# Patient Record
Sex: MALE | Race: WHITE | NOT HISPANIC OR LATINO | Employment: FULL TIME | URBAN - METROPOLITAN AREA
[De-identification: names, ages, dates, MRNs, and addresses within clinical notes are randomized per-mention and may not be internally consistent; named-entity substitution may affect disease eponyms.]

---

## 2018-06-21 ENCOUNTER — HOSPITAL ENCOUNTER (INPATIENT)
Facility: HOSPITAL | Age: 38
LOS: 2 days | Discharge: HOME | DRG: 247 | End: 2018-06-25
Attending: EMERGENCY MEDICINE | Admitting: HOSPITALIST

## 2018-06-21 DIAGNOSIS — R42 DIZZINESS AND GIDDINESS: ICD-10-CM

## 2018-06-21 DIAGNOSIS — I21.4 NSTEMI (NON-ST ELEVATED MYOCARDIAL INFARCTION): ICD-10-CM

## 2018-06-21 DIAGNOSIS — R07.9 CHEST PAIN: ICD-10-CM

## 2018-06-21 DIAGNOSIS — R07.9 CHEST PAIN SYNDROME: ICD-10-CM

## 2018-06-21 DIAGNOSIS — I25.10 CORONARY ARTERY DISEASE INVOLVING NATIVE CORONARY ARTERY OF NATIVE HEART WITHOUT ANGINA PECTORIS: Primary | ICD-10-CM

## 2018-06-21 DIAGNOSIS — I20.0 UNSTABLE ANGINA: ICD-10-CM

## 2018-06-21 LAB
ALBUMIN SERPL BCP-MCNC: 4.3 G/DL
ALP SERPL-CCNC: 80 U/L
ALT SERPL W/O P-5'-P-CCNC: 38 U/L
ANION GAP SERPL CALC-SCNC: 9 MMOL/L
AST SERPL-CCNC: 32 U/L
BASOPHILS # BLD AUTO: 0.05 K/UL
BASOPHILS NFR BLD: 0.5 %
BILIRUB SERPL-MCNC: 0.4 MG/DL
BNP SERPL-MCNC: <10 PG/ML
BUN SERPL-MCNC: 19 MG/DL
CALCIUM SERPL-MCNC: 9.7 MG/DL
CHLORIDE SERPL-SCNC: 109 MMOL/L
CO2 SERPL-SCNC: 22 MMOL/L
CREAT SERPL-MCNC: 1.1 MG/DL
DIFFERENTIAL METHOD: ABNORMAL
EOSINOPHIL # BLD AUTO: 0.1 K/UL
EOSINOPHIL NFR BLD: 1.5 %
ERYTHROCYTE [DISTWIDTH] IN BLOOD BY AUTOMATED COUNT: 12.5 %
EST. GFR  (AFRICAN AMERICAN): >60 ML/MIN/1.73 M^2
EST. GFR  (NON AFRICAN AMERICAN): >60 ML/MIN/1.73 M^2
GLUCOSE SERPL-MCNC: 118 MG/DL
HCT VFR BLD AUTO: 41.3 %
HGB BLD-MCNC: 14.4 G/DL
IMM GRANULOCYTES # BLD AUTO: 0.03 K/UL
IMM GRANULOCYTES NFR BLD AUTO: 0.3 %
LYMPHOCYTES # BLD AUTO: 2.1 K/UL
LYMPHOCYTES NFR BLD: 21.8 %
MCH RBC QN AUTO: 32 PG
MCHC RBC AUTO-ENTMCNC: 34.9 G/DL
MCV RBC AUTO: 92 FL
MONOCYTES # BLD AUTO: 0.6 K/UL
MONOCYTES NFR BLD: 6.7 %
NEUTROPHILS # BLD AUTO: 6.6 K/UL
NEUTROPHILS NFR BLD: 69.2 %
NRBC BLD-RTO: 0 /100 WBC
PLATELET # BLD AUTO: 262 K/UL
PMV BLD AUTO: 10.1 FL
POTASSIUM SERPL-SCNC: 3.7 MMOL/L
PROT SERPL-MCNC: 7.6 G/DL
RBC # BLD AUTO: 4.5 M/UL
SODIUM SERPL-SCNC: 140 MMOL/L
TROPONIN I SERPL DL<=0.01 NG/ML-MCNC: <0.006 NG/ML
WBC # BLD AUTO: 9.47 K/UL

## 2018-06-21 PROCEDURE — G0378 HOSPITAL OBSERVATION PER HR: HCPCS

## 2018-06-21 PROCEDURE — 85025 COMPLETE CBC W/AUTO DIFF WBC: CPT

## 2018-06-21 PROCEDURE — 99285 EMERGENCY DEPT VISIT HI MDM: CPT | Mod: ,,, | Performed by: EMERGENCY MEDICINE

## 2018-06-21 PROCEDURE — 93005 ELECTROCARDIOGRAM TRACING: CPT

## 2018-06-21 PROCEDURE — 84484 ASSAY OF TROPONIN QUANT: CPT

## 2018-06-21 PROCEDURE — 93010 ELECTROCARDIOGRAM REPORT: CPT | Mod: ,,, | Performed by: INTERNAL MEDICINE

## 2018-06-21 PROCEDURE — 99284 EMERGENCY DEPT VISIT MOD MDM: CPT | Mod: 25

## 2018-06-21 PROCEDURE — 80053 COMPREHEN METABOLIC PANEL: CPT

## 2018-06-21 PROCEDURE — 93010 ELECTROCARDIOGRAM REPORT: CPT | Mod: 76,,, | Performed by: INTERNAL MEDICINE

## 2018-06-21 PROCEDURE — 99220 PR INITIAL OBSERVATION CARE,LEVL III: CPT | Mod: ,,, | Performed by: PHYSICIAN ASSISTANT

## 2018-06-21 PROCEDURE — 25000003 PHARM REV CODE 250: Performed by: EMERGENCY MEDICINE

## 2018-06-21 PROCEDURE — 83880 ASSAY OF NATRIURETIC PEPTIDE: CPT

## 2018-06-21 RX ORDER — AMOXICILLIN 250 MG
1 CAPSULE ORAL 2 TIMES DAILY PRN
Status: DISCONTINUED | OUTPATIENT
Start: 2018-06-21 | End: 2018-06-25 | Stop reason: HOSPADM

## 2018-06-21 RX ORDER — KETOROLAC TROMETHAMINE 30 MG/ML
15 INJECTION, SOLUTION INTRAMUSCULAR; INTRAVENOUS EVERY 6 HOURS PRN
Status: ACTIVE | OUTPATIENT
Start: 2018-06-21 | End: 2018-06-24

## 2018-06-21 RX ORDER — ASPIRIN 81 MG/1
81 TABLET ORAL DAILY
Status: DISCONTINUED | OUTPATIENT
Start: 2018-06-22 | End: 2018-06-25 | Stop reason: HOSPADM

## 2018-06-21 RX ORDER — SIMVASTATIN 40 MG/1
40 TABLET, FILM COATED ORAL NIGHTLY
Status: DISCONTINUED | OUTPATIENT
Start: 2018-06-21 | End: 2018-06-25 | Stop reason: HOSPADM

## 2018-06-21 RX ORDER — HYDROCODONE BITARTRATE AND ACETAMINOPHEN 5; 325 MG/1; MG/1
1 TABLET ORAL EVERY 6 HOURS PRN
Status: DISCONTINUED | OUTPATIENT
Start: 2018-06-22 | End: 2018-06-25 | Stop reason: HOSPADM

## 2018-06-21 RX ORDER — IPRATROPIUM BROMIDE AND ALBUTEROL SULFATE 2.5; .5 MG/3ML; MG/3ML
3 SOLUTION RESPIRATORY (INHALATION) EVERY 4 HOURS PRN
Status: DISCONTINUED | OUTPATIENT
Start: 2018-06-21 | End: 2018-06-25 | Stop reason: HOSPADM

## 2018-06-21 RX ORDER — IBUPROFEN 200 MG
16 TABLET ORAL
Status: DISCONTINUED | OUTPATIENT
Start: 2018-06-21 | End: 2018-06-25 | Stop reason: HOSPADM

## 2018-06-21 RX ORDER — ACETAMINOPHEN 325 MG/1
650 TABLET ORAL EVERY 4 HOURS PRN
Status: DISCONTINUED | OUTPATIENT
Start: 2018-06-21 | End: 2018-06-25 | Stop reason: HOSPADM

## 2018-06-21 RX ORDER — IBUPROFEN 200 MG
24 TABLET ORAL
Status: DISCONTINUED | OUTPATIENT
Start: 2018-06-21 | End: 2018-06-25 | Stop reason: HOSPADM

## 2018-06-21 RX ORDER — SODIUM CHLORIDE 0.9 % (FLUSH) 0.9 %
5 SYRINGE (ML) INJECTION
Status: DISCONTINUED | OUTPATIENT
Start: 2018-06-21 | End: 2018-06-25 | Stop reason: HOSPADM

## 2018-06-21 RX ORDER — RAMELTEON 8 MG/1
8 TABLET ORAL NIGHTLY PRN
Status: DISCONTINUED | OUTPATIENT
Start: 2018-06-21 | End: 2018-06-25 | Stop reason: HOSPADM

## 2018-06-21 RX ORDER — PROMETHAZINE HYDROCHLORIDE 12.5 MG/1
25 TABLET ORAL EVERY 6 HOURS PRN
Status: DISCONTINUED | OUTPATIENT
Start: 2018-06-21 | End: 2018-06-25 | Stop reason: HOSPADM

## 2018-06-21 RX ORDER — ENOXAPARIN SODIUM 100 MG/ML
40 INJECTION SUBCUTANEOUS EVERY 24 HOURS
Status: DISCONTINUED | OUTPATIENT
Start: 2018-06-22 | End: 2018-06-25 | Stop reason: HOSPADM

## 2018-06-21 RX ORDER — ASPIRIN 325 MG
325 TABLET ORAL
Status: COMPLETED | OUTPATIENT
Start: 2018-06-21 | End: 2018-06-21

## 2018-06-21 RX ORDER — GLUCAGON 1 MG
1 KIT INJECTION
Status: DISCONTINUED | OUTPATIENT
Start: 2018-06-21 | End: 2018-06-25 | Stop reason: HOSPADM

## 2018-06-21 RX ORDER — LISINOPRIL 10 MG/1
10 TABLET ORAL DAILY
Status: DISCONTINUED | OUTPATIENT
Start: 2018-06-22 | End: 2018-06-25 | Stop reason: HOSPADM

## 2018-06-21 RX ORDER — ONDANSETRON 4 MG/1
4 TABLET, ORALLY DISINTEGRATING ORAL EVERY 8 HOURS PRN
Status: DISCONTINUED | OUTPATIENT
Start: 2018-06-21 | End: 2018-06-25 | Stop reason: HOSPADM

## 2018-06-21 RX ADMIN — ASPIRIN 325 MG ORAL TABLET 325 MG: 325 PILL ORAL at 08:06

## 2018-06-22 PROBLEM — I25.10 CORONARY ARTERY DISEASE INVOLVING NATIVE CORONARY ARTERY OF NATIVE HEART WITHOUT ANGINA PECTORIS: Status: ACTIVE | Noted: 2018-06-22

## 2018-06-22 PROBLEM — R07.89 ATYPICAL CHEST PAIN: Status: ACTIVE | Noted: 2018-06-21

## 2018-06-22 PROBLEM — I50.22 CHRONIC SYSTOLIC HEART FAILURE: Status: ACTIVE | Noted: 2018-06-22

## 2018-06-22 LAB
ANION GAP SERPL CALC-SCNC: 9 MMOL/L
BASOPHILS # BLD AUTO: 0.04 K/UL
BASOPHILS NFR BLD: 0.5 %
BUN SERPL-MCNC: 21 MG/DL
CALCIUM SERPL-MCNC: 9.5 MG/DL
CHLORIDE SERPL-SCNC: 107 MMOL/L
CHOLEST SERPL-MCNC: 173 MG/DL
CHOLEST/HDLC SERPL: 7.2 {RATIO}
CO2 SERPL-SCNC: 24 MMOL/L
CREAT SERPL-MCNC: 1 MG/DL
DIASTOLIC DYSFUNCTION: NO
DIFFERENTIAL METHOD: ABNORMAL
EOSINOPHIL # BLD AUTO: 0.2 K/UL
EOSINOPHIL NFR BLD: 2 %
ERYTHROCYTE [DISTWIDTH] IN BLOOD BY AUTOMATED COUNT: 12.6 %
EST. GFR  (AFRICAN AMERICAN): >60 ML/MIN/1.73 M^2
EST. GFR  (NON AFRICAN AMERICAN): >60 ML/MIN/1.73 M^2
ESTIMATED AVG GLUCOSE: 100 MG/DL
GLUCOSE SERPL-MCNC: 94 MG/DL
HBA1C MFR BLD HPLC: 5.1 %
HCT VFR BLD AUTO: 38.9 %
HDLC SERPL-MCNC: 24 MG/DL
HDLC SERPL: 13.9 %
HGB BLD-MCNC: 13.3 G/DL
IMM GRANULOCYTES # BLD AUTO: 0.02 K/UL
IMM GRANULOCYTES NFR BLD AUTO: 0.3 %
LDLC SERPL CALC-MCNC: 124.8 MG/DL
LYMPHOCYTES # BLD AUTO: 2.7 K/UL
LYMPHOCYTES NFR BLD: 34.2 %
MAGNESIUM SERPL-MCNC: 2.3 MG/DL
MCH RBC QN AUTO: 32 PG
MCHC RBC AUTO-ENTMCNC: 34.2 G/DL
MCV RBC AUTO: 94 FL
MONOCYTES # BLD AUTO: 0.6 K/UL
MONOCYTES NFR BLD: 7.1 %
NEUTROPHILS # BLD AUTO: 4.4 K/UL
NEUTROPHILS NFR BLD: 55.9 %
NONHDLC SERPL-MCNC: 149 MG/DL
NRBC BLD-RTO: 0 /100 WBC
PHOSPHATE SERPL-MCNC: 4.9 MG/DL
PLATELET # BLD AUTO: 215 K/UL
PMV BLD AUTO: 9.8 FL
POTASSIUM SERPL-SCNC: 4.1 MMOL/L
RBC # BLD AUTO: 4.16 M/UL
RETIRED EF AND QEF - SEE NOTES: 50 (ref 55–65)
SODIUM SERPL-SCNC: 140 MMOL/L
TRIGL SERPL-MCNC: 121 MG/DL
TROPONIN I SERPL DL<=0.01 NG/ML-MCNC: <0.006 NG/ML
TROPONIN I SERPL DL<=0.01 NG/ML-MCNC: <0.006 NG/ML
WBC # BLD AUTO: 7.87 K/UL

## 2018-06-22 PROCEDURE — 80061 LIPID PANEL: CPT

## 2018-06-22 PROCEDURE — 93880 EXTRACRANIAL BILAT STUDY: CPT

## 2018-06-22 PROCEDURE — 93306 TTE W/DOPPLER COMPLETE: CPT | Mod: 26,,, | Performed by: INTERNAL MEDICINE

## 2018-06-22 PROCEDURE — 93306 TTE W/DOPPLER COMPLETE: CPT

## 2018-06-22 PROCEDURE — 93880 EXTRACRANIAL BILAT STUDY: CPT | Mod: 26,,, | Performed by: INTERNAL MEDICINE

## 2018-06-22 PROCEDURE — 84484 ASSAY OF TROPONIN QUANT: CPT | Mod: 91

## 2018-06-22 PROCEDURE — 85025 COMPLETE CBC W/AUTO DIFF WBC: CPT

## 2018-06-22 PROCEDURE — 36415 COLL VENOUS BLD VENIPUNCTURE: CPT

## 2018-06-22 PROCEDURE — 83036 HEMOGLOBIN GLYCOSYLATED A1C: CPT

## 2018-06-22 PROCEDURE — G0378 HOSPITAL OBSERVATION PER HR: HCPCS

## 2018-06-22 PROCEDURE — 84100 ASSAY OF PHOSPHORUS: CPT

## 2018-06-22 PROCEDURE — 25000003 PHARM REV CODE 250: Performed by: PHYSICIAN ASSISTANT

## 2018-06-22 PROCEDURE — 99233 SBSQ HOSP IP/OBS HIGH 50: CPT | Mod: ,,, | Performed by: PHYSICIAN ASSISTANT

## 2018-06-22 PROCEDURE — 80048 BASIC METABOLIC PNL TOTAL CA: CPT

## 2018-06-22 PROCEDURE — 83735 ASSAY OF MAGNESIUM: CPT

## 2018-06-22 PROCEDURE — 63600175 PHARM REV CODE 636 W HCPCS: Performed by: PHYSICIAN ASSISTANT

## 2018-06-22 RX ORDER — CLONAZEPAM 1 MG/1
1 TABLET ORAL 2 TIMES DAILY PRN
Status: DISCONTINUED | OUTPATIENT
Start: 2018-06-22 | End: 2018-06-25 | Stop reason: HOSPADM

## 2018-06-22 RX ORDER — NICOTINE 7MG/24HR
1 PATCH, TRANSDERMAL 24 HOURS TRANSDERMAL DAILY
Status: DISCONTINUED | OUTPATIENT
Start: 2018-06-22 | End: 2018-06-25 | Stop reason: HOSPADM

## 2018-06-22 RX ADMIN — LISINOPRIL 10 MG: 10 TABLET ORAL at 08:06

## 2018-06-22 RX ADMIN — ASPIRIN 81 MG: 81 TABLET, COATED ORAL at 08:06

## 2018-06-22 RX ADMIN — CLONAZEPAM 1 MG: 1 TABLET ORAL at 11:06

## 2018-06-22 RX ADMIN — ENOXAPARIN SODIUM 40 MG: 100 INJECTION SUBCUTANEOUS at 05:06

## 2018-06-22 RX ADMIN — CLONAZEPAM 1 MG: 1 TABLET ORAL at 08:06

## 2018-06-22 RX ADMIN — SIMVASTATIN 40 MG: 20 TABLET, FILM COATED ORAL at 12:06

## 2018-06-22 RX ADMIN — HYDROCODONE BITARTRATE AND ACETAMINOPHEN 1 TABLET: 5; 325 TABLET ORAL at 08:06

## 2018-06-22 RX ADMIN — SIMVASTATIN 40 MG: 20 TABLET, FILM COATED ORAL at 08:06

## 2018-06-22 RX ADMIN — POTASSIUM BICARBONATE 25 MEQ: 25 TABLET, EFFERVESCENT ORAL at 12:06

## 2018-06-22 NOTE — PLAN OF CARE
06/22/18 0915   Discharge Assessment   Assessment Type Discharge Planning Assessment   Confirmed/corrected address and phone number on facesheet? Yes   Assessment information obtained from? Patient   Expected Length of Stay (days) 1   Communicated expected length of stay with patient/caregiver yes   Prior to hospitilization cognitive status: Alert/Oriented   Prior to hospitalization functional status: Independent   Current cognitive status: Alert/Oriented   Current Functional Status: Independent   Lives With significant other  (s.o., Karli Tolentino ([phone]))   Able to Return to Prior Arrangements yes   Is patient able to care for self after discharge? Yes   Patient's perception of discharge disposition home or selfcare   Readmission Within The Last 30 Days no previous admission in last 30 days   Patient currently being followed by outpatient case management? No   Patient currently receives any other outside agency services? No   Equipment Currently Used at Home none   Do you have any problems affording any of your prescribed medications? No   Is the patient taking medications as prescribed? yes   Does the patient have transportation home? Yes   Transportation Available family or friend will provide  (spouse)   Does the patient receive services at the [facility]? No   Discharge Plan A Home with family   Discharge Plan B Home Health   Patient/Family In Agreement With Plan yes     Dx: chest pain  Pharm: [facility]  Hosp f/u appt: Patient instructed to go to Dr. Abad Patterson's (PCP) walk-in clinic 1-2 weeks following discharge for the hospital follow up appointment.     Patient scheduled to have a 2D echo & carotid US done today.

## 2018-06-22 NOTE — PROGRESS NOTES
[facility] Hospital Medicine  Progress Note    Patient Name: Cody Castro  MRN: [id number]  Patient Class: OP- Observation   Admission Date: 6/21/2018  Length of Stay: 0 days  Attending Physician: Yomaira Vargas MD  Primary Care Provider: Abad Patterson MD    [facility] Medicine Team: [facility] HOSP MED E Trista Boston PA-C    Subjective:     Principal Problem:Atypical chest pain    HPI:  This is a  37 y.o. male with pmhx CAD (s/p LAD and D1 PCI in 2013), +FH CAD w/ father having MI @ age 45, current smoker (25 pack year), HTN, HLD, pAfib (not on AC) who presents to the ED c/o acute episode of burning, mild (3/10), non-radiating chest pain with associated dizziness, LUE numbness and vision changes. Patient states that symptoms began at 530 pm when he stood up from sitting.  Symptoms resolved without intervention by the time he got into his car to come to the ED.  Patient reports CP similar to heartburn but had experienced BUE numbness prior to previous NSTEMI. Pt denies any SOB, CP, dizziness, jaw pain, numbness, tingling, vision changes, headache, f/c.  Pt denies CP and SOB in the ED.  States his LUE tingling felt similar to previous NSTEMI episode in 2013. Patient currently f/w PCP Dr. Patterson on [address] but does not currently f/w cardiology (preivously seen by [facility]).  Patient is poor historian and has poor understanding of medical dx.     Per chart review, admitted in 2013 with CP, SOB, diaphoresis and 2 episodes of syncope while walking in [facility] and dx with NSTEMI.  No changes on EKG but troponin trended up to 50.  ECHO on admit showed moderately depressed EF.  LHC revealed occluded LAD (collateral RCA flow) and 75% stenosis to prox D1.  MANJINDER x2 placed in LAD and PTA to diagnonal.  New onset A fib when initially presented, but spontaneously converted to NSR on day of admission.     In ED, intake labs unremarkable.  Initial troponin and BNP negative. CXR WNL.   Initial EKG shows SR with PVCs  and inverted T waves in inferior leads.  Repeat EKG shows NSR with sinus arrhythmia and inverted inferior T waves.     Hospital Course:  Cody Castro was admitted to observation for chest pain. Color flow doppler study revealed possible new wall motion abnormalities therefore exercise stress echo could not be completed. Interventional cardiology consulted.      Interval History: Patient resting in bed, no acute distress.     Review of Systems   Constitutional: Negative for chills and fever.   HENT: Negative for ear pain and sore throat.    Eyes: Negative for pain and visual disturbance.   Respiratory: Negative for cough and shortness of breath.    Cardiovascular: Negative for chest pain (resolved), palpitations and leg swelling.   Gastrointestinal: Negative for abdominal pain, diarrhea, nausea and vomiting.   Endocrine: Negative for heat intolerance and polydipsia.   Genitourinary: Negative for difficulty urinating, dysuria, frequency and urgency.   Musculoskeletal: Positive for back pain (chronic). Negative for gait problem.   Skin: Negative for rash and wound.   Neurological: Negative for dizziness (resolved), weakness and numbness (LUE; resolved).   Psychiatric/Behavioral: Negative for confusion. The patient is not nervous/anxious.      Objective:     Vital Signs (Most Recent):  Temp: 97.8 °F (36.6 °C) (06/22/18 1619)  Pulse: 70 (06/22/18 1619)  Resp: 16 (06/22/18 1619)  BP: (!) 122/59 (06/22/18 1619)  SpO2: 98 % (06/22/18 1619) Vital Signs (24h Range):  Temp:  [96.6 °F (35.9 °C)-97.8 °F (36.6 °C)] 97.8 °F (36.6 °C)  Pulse:  [56-91] 70  Resp:  [16-20] 16  SpO2:  [94 %-98 %] 98 %  BP: (111-158)/(56-81) 122/59     Weight: 109 kg (240 lb 4.8 oz)  Body mass index is 36.54 kg/m².    Intake/Output Summary (Last 24 hours) at 06/22/18 1816  Last data filed at 06/22/18 0101   Gross per 24 hour   Intake                0 ml   Output                0 ml   Net                0 ml      Physical Exam   Constitutional: He is  oriented to person, place, and time. He appears well-developed and well-nourished.   HENT:   Head: Normocephalic and atraumatic.   Eyes: Conjunctivae and EOM are normal. Pupils are equal, round, and reactive to light.   Neck: Normal range of motion. Neck supple.   Cardiovascular: Normal rate, regular rhythm, normal heart sounds and intact distal pulses.    Pulmonary/Chest: Effort normal. No respiratory distress. He has decreased breath sounds in the right upper field, the right middle field, the left upper field and the left middle field. He has no wheezes. He exhibits no tenderness.   Abdominal: Soft. Bowel sounds are normal. He exhibits no distension. There is no tenderness.   Musculoskeletal: Normal range of motion.   Neurological: He is alert and oriented to person, place, and time.   BUE STR 5/5 without numbness/tingling   Skin: Skin is warm and dry.   Psychiatric: He has a normal mood and affect. His behavior is normal. Judgment and thought content normal.   Nursing note and vitals reviewed.      Significant Labs: All pertinent labs within the past 24 hours have been reviewed.    Significant Imaging: I have reviewed all pertinent imaging results/findings within the past 24 hours.    Assessment/Plan:      * Atypical chest pain    37 y.o. male with pmhx CAD (s/p LAD and D1 PCI in 2013), +FH CAD w/ father having MI @ age 45, current smoker (25 pack year), HTN, pAfib, HLD who presents to the ED c/o acute episode of burning, mild (3/10), non-radiating chest pain with associated dizziness, LUE numbness and vision changes.  Symptoms resolved before presentation.    During color flow doppler study patient was found to have new wall motion abnormalities, interventional cardiology consulted  - Suspect GERD as etiology since patient had recently eaten pizza.  Possible ACS in setting of multiple risk factors.   - Per patient, arm numbness similar to previous presentation with NSTEMI (albeit unilateral today), but no  mention of arm numbness per chart review.   - Initial troponin and BNP negative. Will trend.   - CXR WNL.     - EKG x2 shows inverted inferior T waves. Patient asymptomatic with poor story.  Last EKG was from 2014 and could have flipped T waves since then for reasons other than ischemia. Discussed EKG with Dr. Santoyo.   - HEART score 4  - Will repeat ECHO as last was 9/3/2014  - Hg A1c 5.1  - consider stress echo or cardiology consult if troponins increase or CP returns.  - given  in ED.  Continue ASA daily.  - will need close cardiology f/u as he is poor historian and continues to smoke despite cardiac hx.         Coronary artery disease involving native coronary artery of native heart without angina pectoris    MANJINDER x2 placed in LAD and PTA to diagonal (2013)  - continue ASA. Plavix d/c after 1 year s/p PCI  - continue statin, ACEI  - does not follow with cardiology anymore, but would benefit from close cardiology f/u (previously with [facility])        Chronic systolic heart failure    Stable  - patient was not aware of this diagnosis.  Pt is also poor historian.  - Stress ECHO 9/3/2014 shows mildly depressed systolic function (45%), normal diastolic function.  Also showed non-specific failure to augment in segments with hypokinesis at rest, which cardiology did not believe was d/t ischemia.    - mild bibasilar rales, which may be atelectasis, otherwise euvolemic.  - BNP negative in setting of obesity, CXR with no evidence of edema  - not currently on any diuretics.  EF logan over the course of a year after diagnosed with NSTEMI (EF 25 at the time).  - Will recheck ECHO   - continue ACEI  - strict I/O, daily weights, tele        Paroxysmal A-fib    Afib with RVR in 2013 when dx with NSTEMI. Converted to NSR without intervention same day of admission.  - EKG shows NSR  - not currenly on BB, AC  - patient unsure if he was on AC in the past.  - CHADS VASC score 3; HASBLED 1        Cigarette smoker    25 pack year hx;  currently 1ppd  - has attempted to quit in the past but not currently motivated to do so.    - significant CAD risk factor  - nicotine patch        Hyperlipidemia    Continue statin        HTN (hypertension)    Stable  - continue lisinopril 10 mg PO daily          VTE Risk Mitigation         Ordered     enoxaparin injection 40 mg  Daily      06/21/18 2242     IP VTE HIGH RISK PATIENT  Once      06/21/18 2242     Place MARIANN hose  Until discontinued      06/21/18 2242     Place sequential compression device  Until discontinued      06/21/18 2242              Trista Boston PA-C  Department of Hospital Medicine   [facility]

## 2018-06-22 NOTE — H&P
[facility] Hospital Medicine  History & Physical    Patient Name: Cody Castro  MRN: [id number]  Admission Date: 6/21/2018  Attending Physician: Yomaira Vargas MD   Primary Care Provider: Abad Patterson MD    [facility] Medicine Team: [facility] HOSP MED E Caio Wyman PA-C     Patient information was obtained from patient, past medical records and ER records.     Subjective:     Principal Problem:Atypical chest pain    Chief Complaint:   Chief Complaint   Patient presents with    Chest Pain     Pt presents to ED stating he had an episode of mild chest pain, left arm tingling, dizziness about 20 minutes ago that only lasted about 1 minute. Hx of MI with 2 stents placed. Denies chest pain and SOB at this time. States the tingling in his arm felt exactly like it did when he had previous NSTEMI        HPI: This is a  37 y.o. male with pmhx CAD (s/p LAD and D1 PCI in 2013), +FH CAD w/ father having MI @ age 45, current smoker (25 pack year), HTN, HLD, pAfib (not on AC) who presents to the ED c/o acute episode of burning, mild (3/10), non-radiating chest pain with associated dizziness, LUE numbness and vision changes. Patient states that symptoms began at 530 pm when he stood up from sitting.  Symptoms resolved without intervention by the time he got into his car to come to the ED.  Patient reports CP similar to heartburn but had experienced BUE numbness prior to previous NSTEMI. Pt denies any SOB, CP, dizziness, jaw pain, numbness, tingling, vision changes, headache, f/c.  Pt denies CP and SOB in the ED.  States his LUE tingling felt similar to previous NSTEMI episode in 2013. Patient currently f/w PCP Dr. Patterson on [address] but does not currently f/w cardiology (preivously seen by [facility] cardio).  Patient is poor historian and has poor understanding of medical dx.     Per chart review, admitted in 2013 with CP, SOB, diaphoresis and 2 episodes of syncope while walking in [employer] and dx with NSTEMI.  No  changes on EKG but troponin trended up to 50.  ECHO on admit showed moderately depressed EF.  LHC revealed occluded LAD (collateral RCA flow) and 75% stenosis to prox D1.  MANJINDER x2 placed in LAD and PTA to diagnonal.  New onset A fib when initially presented, but spontaneously converted to NSR on day of admission.     In ED, intake labs unremarkable.  Initial troponin and BNP negative. CXR WNL.   Initial EKG shows SR with PVCs and inverted T waves in inferior leads.  Repeat EKG shows NSR with sinus arrhythmia and inverted inferior T waves.     Past Medical History:   Diagnosis Date    Anxiety     Carbon monoxide poisoning     Caused a seizure    Coronary artery disease     History of heart artery stent 2013    Hypertension     Obesity        Past Surgical History:   Procedure Laterality Date    CORONARY ANGIOPLASTY         Review of patient's allergies indicates:  No Known Allergies    No current facility-administered medications on file prior to encounter.      Current Outpatient Prescriptions on File Prior to Encounter   Medication Sig    aspirin (ECOTRIN) 81 MG EC tablet Take 81 mg by mouth once daily.    HYDROCODONE/ACETAMINOPHEN (NORCO ORAL) Take 7.5 mg by mouth every 6 (six) hours as needed.     lisinopril (PRINIVIL,ZESTRIL) 5 MG tablet Take 10 mg by mouth once daily.    simvastatin (ZOCOR) 40 MG tablet Take 40 mg by mouth every evening.    clonazepam (KLONOPIN) 1 MG tablet Take 1 mg by mouth 2 (two) times daily as needed.     Family History     Problem Relation (Age of Onset)    Heart disease Mother, Father        Social History Main Topics    Smoking status: Current Every Day Smoker     Packs/day: 0.50     Years: 20.00     Types: Cigarettes     Last attempt to quit: 2/10/2009    Smokeless tobacco: Never Used    Alcohol use Yes      Comment: occasional    Drug use: No    Sexual activity: Yes     Partners: Female     Review of Systems   Constitutional: Negative for chills and fever.   HENT:  Negative for ear pain and sore throat.    Eyes: Negative for pain and visual disturbance.   Respiratory: Negative for cough and shortness of breath.    Cardiovascular: Positive for chest pain (resolved). Negative for palpitations and leg swelling.   Gastrointestinal: Negative for abdominal pain, diarrhea, nausea and vomiting.   Endocrine: Negative for heat intolerance and polydipsia.   Genitourinary: Negative for difficulty urinating, dysuria, frequency and urgency.   Musculoskeletal: Positive for back pain (chronic). Negative for gait problem.   Skin: Negative for rash and wound.   Neurological: Positive for dizziness (resolved) and numbness (LUE; resolved). Negative for weakness.   Psychiatric/Behavioral: Negative for confusion. The patient is not nervous/anxious.      Objective:     Vital Signs (Most Recent):  Temp: 97.5 °F (36.4 °C) (06/21/18 1920)  Pulse: 73 (06/21/18 2302)  Resp: 18 (06/21/18 2144)  BP: 129/70 (06/21/18 2302)  SpO2: (!) 94 % (06/21/18 2302) Vital Signs (24h Range):  Temp:  [97.5 °F (36.4 °C)] 97.5 °F (36.4 °C)  Pulse:  [73-91] 73  Resp:  [18-20] 18  SpO2:  [94 %-97 %] 94 %  BP: (121-158)/(56-81) 129/70     Weight: 112.5 kg (248 lb)  Body mass index is 37.71 kg/m².    Physical Exam   Constitutional: He is oriented to person, place, and time. He appears well-developed and well-nourished.   HENT:   Head: Normocephalic and atraumatic.   Eyes: Conjunctivae and EOM are normal. Pupils are equal, round, and reactive to light.   Neck: Normal range of motion. Neck supple.   Cardiovascular: Normal rate, regular rhythm, normal heart sounds and intact distal pulses.    Pulmonary/Chest: Effort normal. No respiratory distress. He has decreased breath sounds in the right upper field, the right middle field, the left upper field and the left middle field. He has no wheezes. He has rales. He exhibits no tenderness.   + mild bibasilar rales   Abdominal: Soft. Bowel sounds are normal. He exhibits no distension.  There is no tenderness.   Musculoskeletal: Normal range of motion.   Neurological: He is alert and oriented to person, place, and time.   BUE STR 5/5 without numbness/tingling   Skin: Skin is warm and dry.   Psychiatric: He has a normal mood and affect. His behavior is normal. Judgment and thought content normal.   Nursing note and vitals reviewed.        CRANIAL NERVES     CN III, IV, VI   Pupils are equal, round, and reactive to light.  Extraocular motions are normal.        Significant Labs:   CBC:   Recent Labs  Lab 06/21/18 2053   WBC 9.47   HGB 14.4   HCT 41.3        CMP:   Recent Labs  Lab 06/21/18 2053      K 3.7      CO2 22*   *   BUN 19   CREATININE 1.1   CALCIUM 9.7   PROT 7.6   ALBUMIN 4.3   BILITOT 0.4   ALKPHOS 80   AST 32   ALT 38   ANIONGAP 9   EGFRNONAA >60.0     Troponin:   Recent Labs  Lab 06/21/18 2053   TROPONINI <0.006       Significant Imaging: I have reviewed all pertinent imaging results/findings within the past 24 hours.    Assessment/Plan:     * Atypical chest pain    37 y.o. male with pmhx CAD (s/p LAD and D1 PCI in 2013), +FH CAD w/ father having MI @ age 45, current smoker (25 pack year), HTN, pAfib, HLD who presents to the ED c/o acute episode of burning, mild (3/10), non-radiating chest pain with associated dizziness, LUE numbness and vision changes.  Symptoms resolved before presentation.    - Suspect GERD as etiology since patient had recently eaten pizza.  Possible ACS in setting of multiple risk factors.   - Per patient, arm numbness similar to previous presentation with NSTEMI (albeit unilateral today), but no mention of arm numbness per chart review.   - Initial troponin and BNP negative. Will trend.   - CXR WNL.     - EKG x2 shows inverted inferior T waves. Patient asymptomatic with poor story.  Last EKG was from 2014 and could have flipped T waves since then for reasons other than ischemia. Discussed EKG with Dr. Santoyo.   - HEART score 4  - Will  repeat ECHO as last was 9/3/2014  - will check Hg A1c  - consider stress echo or cardiology consult if troponins increase or CP returns.  - given  in ED.  Continue ASA daily.  - will need close cardiology f/u as he is poor historian and continues to smoke despite cardiac hx.         Coronary artery disease involving native coronary artery of native heart without angina pectoris    MANJINDER x2 placed in LAD and PTA to diagonal (2013)  - continue ASA. Plavix d/c after 1 year s/p PCI  - continue statin, ACEI  - does not follow with cardiology anymore, but would benefit from close cardiology f/u (previously with [facility])        Chronic systolic heart failure    Stable  - patient was not aware of this diagnosis.  Pt is also poor historian.  - Stress ECHO 9/3/2014 shows mildly depressed systolic function (45%), normal diastolic function.  Also showed non-specific failure to augment in segments with hypokinesis at rest, which cardiology did not believe was d/t ischemia.    - mild bibasilar rales, which may be atelectasis, otherwise euvolemic.  - BNP negative in setting of obesity, CXR with no evidence of edema  - not currently on any diuretics.  EF logan over the course of a year after diagnosed with NSTEMI (EF 25 at the time).  - Will recheck ECHO   - continue ACEI  - strict I/O, daily weights, tele        Cigarette smoker    25 pack year hx; currently 1ppd  - has attempted to quit in the past but not currently motivated to do so.    - significant CAD risk factor  - nicotine patch        Paroxysmal A-fib    Afib with RVR in 2013 when dx with NSTEMI. Converted to NSR without intervention same day of admission.  - EKG shows NSR  - not currenly on BB, AC  - patient unsure if he was on AC in the past.  - CHADS VASC score 3; HASBLED 1        Hyperlipidemia    Continue statin        HTN (hypertension)    Stable  - continue lisinopril 10 mg PO daily          VTE Risk Mitigation         Ordered     enoxaparin injection 40 mg  Daily       06/21/18 2242     IP VTE HIGH RISK PATIENT  Once      06/21/18 2242     Place MARIANN hose  Until discontinued      06/21/18 2242     Place sequential compression device  Until discontinued      06/21/18 2242             Caio Wyman PA-C  Department of Hospital Medicine   [facility]

## 2018-06-22 NOTE — ASSESSMENT & PLAN NOTE
Afib with RVR in 2013 when dx with NSTEMI. Converted to NSR without intervention same day of admission.  - EKG shows NSR  - not currenly on BB, AC  - patient unsure if he was on AC in the past.  - CHADS VASC score 3; HASBLED 1

## 2018-06-22 NOTE — ASSESSMENT & PLAN NOTE
37 y.o. male with pmhx CAD (s/p LAD and D1 PCI in 2013), +FH CAD w/ father having MI @ age 45, current smoker (25 pack year), HTN, pAfib, HLD who presents to the ED c/o acute episode of burning, mild (3/10), non-radiating chest pain with associated dizziness, LUE numbness and vision changes.  Symptoms resolved before presentation.    - Suspect GERD as etiology since patient had recently eaten pizza.  Possible ACS in setting of multiple risk factors.   - Per patient, arm numbness similar to previous presentation with NSTEMI (albeit unilateral today), but no mention of arm numbness per chart review.   - Initial troponin and BNP negative. Will trend.   - CXR WNL.     - EKG x2 shows inverted inferior T waves. Patient asymptomatic with poor story.  Last EKG was from 2014 and could have flipped T waves since then for reasons other than ischemia. Discussed EKG with Dr. Santoyo.   - HEART score 4  - Will repeat ECHO as last was 9/3/2014  - will check Hg A1c  - consider stress echo or cardiology consult if troponins increase or CP returns.  - given  in ED.  Continue ASA daily.  - will need close cardiology f/u as he is poor historian and continues to smoke despite cardiac hx.

## 2018-06-22 NOTE — NURSING
Pt returned from echo via w/c. No distress noted. Received report from Merlyn RUBALCAVA in echo lab.

## 2018-06-22 NOTE — SUBJECTIVE & OBJECTIVE
Past Medical History:   Diagnosis Date    Anxiety     Carbon monoxide poisoning     Caused a seizure    Coronary artery disease     History of heart artery stent 2013    Hypertension     Obesity        Past Surgical History:   Procedure Laterality Date    CORONARY ANGIOPLASTY         Review of patient's allergies indicates:  No Known Allergies    No current facility-administered medications on file prior to encounter.      Current Outpatient Prescriptions on File Prior to Encounter   Medication Sig    aspirin (ECOTRIN) 81 MG EC tablet Take 81 mg by mouth once daily.    HYDROCODONE/ACETAMINOPHEN (NORCO ORAL) Take 7.5 mg by mouth every 6 (six) hours as needed.     lisinopril (PRINIVIL,ZESTRIL) 5 MG tablet Take 10 mg by mouth once daily.    simvastatin (ZOCOR) 40 MG tablet Take 40 mg by mouth every evening.    clonazepam (KLONOPIN) 1 MG tablet Take 1 mg by mouth 2 (two) times daily as needed.     Family History     Problem Relation (Age of Onset)    Heart disease Mother, Father        Social History Main Topics    Smoking status: Current Every Day Smoker     Packs/day: 0.50     Years: 20.00     Types: Cigarettes     Last attempt to quit: 2/10/2009    Smokeless tobacco: Never Used    Alcohol use Yes      Comment: occasional    Drug use: No    Sexual activity: Yes     Partners: Female     Review of Systems   Constitutional: Negative for chills and fever.   HENT: Negative for ear pain and sore throat.    Eyes: Negative for pain and visual disturbance.   Respiratory: Negative for cough and shortness of breath.    Cardiovascular: Positive for chest pain (resolved). Negative for palpitations and leg swelling.   Gastrointestinal: Negative for abdominal pain, diarrhea, nausea and vomiting.   Endocrine: Negative for heat intolerance and polydipsia.   Genitourinary: Negative for difficulty urinating, dysuria, frequency and urgency.   Musculoskeletal: Positive for back pain (chronic). Negative for gait problem.    Skin: Negative for rash and wound.   Neurological: Positive for dizziness (resolved) and numbness (LUE; resolved). Negative for weakness.   Psychiatric/Behavioral: Negative for confusion. The patient is not nervous/anxious.      Objective:     Vital Signs (Most Recent):  Temp: 97.5 °F (36.4 °C) (06/21/18 1920)  Pulse: 73 (06/21/18 2302)  Resp: 18 (06/21/18 2144)  BP: 129/70 (06/21/18 2302)  SpO2: (!) 94 % (06/21/18 2302) Vital Signs (24h Range):  Temp:  [97.5 °F (36.4 °C)] 97.5 °F (36.4 °C)  Pulse:  [73-91] 73  Resp:  [18-20] 18  SpO2:  [94 %-97 %] 94 %  BP: (121-158)/(56-81) 129/70     Weight: 112.5 kg (248 lb)  Body mass index is 37.71 kg/m².    Physical Exam   Constitutional: He is oriented to person, place, and time. He appears well-developed and well-nourished.   HENT:   Head: Normocephalic and atraumatic.   Eyes: Conjunctivae and EOM are normal. Pupils are equal, round, and reactive to light.   Neck: Normal range of motion. Neck supple.   Cardiovascular: Normal rate, regular rhythm, normal heart sounds and intact distal pulses.    Pulmonary/Chest: Effort normal. No respiratory distress. He has decreased breath sounds in the right upper field, the right middle field, the left upper field and the left middle field. He has no wheezes. He has rales. He exhibits no tenderness.   + mild bibasilar rales   Abdominal: Soft. Bowel sounds are normal. He exhibits no distension. There is no tenderness.   Musculoskeletal: Normal range of motion.   Neurological: He is alert and oriented to person, place, and time.   BUE STR 5/5 without numbness/tingling   Skin: Skin is warm and dry.   Psychiatric: He has a normal mood and affect. His behavior is normal. Judgment and thought content normal.   Nursing note and vitals reviewed.        CRANIAL NERVES     CN III, IV, VI   Pupils are equal, round, and reactive to light.  Extraocular motions are normal.        Significant Labs:   CBC:   Recent Labs  Lab 06/21/18  0522   WBC 9.47    HGB 14.4   HCT 41.3        CMP:   Recent Labs  Lab 06/21/18 2053      K 3.7      CO2 22*   *   BUN 19   CREATININE 1.1   CALCIUM 9.7   PROT 7.6   ALBUMIN 4.3   BILITOT 0.4   ALKPHOS 80   AST 32   ALT 38   ANIONGAP 9   EGFRNONAA >60.0     Troponin:   Recent Labs  Lab 06/21/18 2053   TROPONINI <0.006       Significant Imaging: I have reviewed all pertinent imaging results/findings within the past 24 hours.

## 2018-06-22 NOTE — ASSESSMENT & PLAN NOTE
MANJINDER x2 placed in LAD and PTA to diagonal (2013)  - continue ASA. Plavix d/c after 1 year s/p PCI  - continue statin, ACEI  - does not follow with cardiology anymore, but would benefit from close cardiology f/u (previously with [facility])

## 2018-06-22 NOTE — PLAN OF CARE
Problem: Patient Care Overview  Goal: Plan of Care Review  Outcome: Ongoing (interventions implemented as appropriate)  Pt admitted and care plan initiated.remains on telemetry,NSR.troponin levels negative.denies chest pain.NPO.scheduled for 2d echo and stress test this ma.safety precautions implemented.continue plan of care.

## 2018-06-22 NOTE — ED NOTES
Pt had an episode of CP and left arm pain for a couple minutes. Pt denies pain at this time. Pt states he was scared because it felt similar to his MI in the past.

## 2018-06-22 NOTE — ED NOTES
Patient placed on cardiac monitor, continuous pulse ox, cycling blood pressures. Side rails up x1, call light within reach, bed in low position with brakes engaged. Patient denies chest pain currently.

## 2018-06-22 NOTE — PLAN OF CARE
Problem: Patient Care Overview  Goal: Plan of Care Review  Outcome: Ongoing (interventions implemented as appropriate)  Pt with no falls or injuries this shift. Pt afebrile, no s/s infection. Cardiac monitoring NSR-SB. Pt reports 3/10 pain to lower back post pain med

## 2018-06-22 NOTE — HPI
This is a  37 y.o. male with pmhx CAD (s/p LAD and D1 PCI in 2013), +FH CAD w/ father having MI @ age 44, current smoker (25 pack year), HTN, HLD, pAfib (not on AC) who presents to the ED c/o acute episode of burning, mild (3/10), non-radiating chest pain with associated dizziness, LUE numbness and vision changes. Patient states that symptoms began at 530 pm when he stood up from sitting.  Symptoms resolved without intervention by the time he got into his car to come to the ED.  Patient reports CP similar to heartburn but had experienced BUE numbness prior to previous NSTEMI. Pt denies any SOB, CP, dizziness, jaw pain, numbness, tingling, vision changes, headache, f/c.  Pt denies CP and SOB in the ED.  States his LUE tingling felt similar to previous NSTEMI episode in 2013. Patient currently f/w PCP Dr. Patterson on [address] but does not currently f/w cardiology (preivously seen by [facility]).  Patient is poor historian and has poor understanding of medical dx.     Per chart review, admitted in 2013 with CP, SOB, diaphoresis and 2 episodes of syncope while walking in [facility] and dx with NSTEMI.  No changes on EKG but troponin trended up to 50.  ECHO on admit showed moderately depressed EF.  LHC revealed occluded LAD (collateral RCA flow) and 75% stenosis to prox D1.  MANJINDRE x2 placed in LAD and PTA to diagnonal.  New onset A fib when initially presented, but spontaneously converted to NSR on day of admission.     In ED, intake labs unremarkable.  Initial troponin and BNP negative. CXR WNL.   Initial EKG shows SR with PVCs and inverted T waves in inferior leads.  Repeat EKG shows NSR with sinus arrhythmia and inverted inferior T waves.

## 2018-06-22 NOTE — PROGRESS NOTES
Pt here for ex2d from obs 12. Informed by sonographer she showed pt images to dr purdy. Pt has significant wall motion abnormalities. Pt has not been seen by cards. Stress portion cx. Color flow doppler added to echo as it has been a while since last echo. Pt in nad, denies chest pain or any sx. At this time. B/p 103/68; ekg shows nsr 74.  Call made to dr zahida mills. Informed her of above. She will consult cardiology. ekg saved.  Report given to floor nurse costa hughes. Pt sent back to room . Above explained to pt. Verbalizes understanding.

## 2018-06-22 NOTE — HOSPITAL COURSE
Cody Castro was admitted to observation for chest pain. On admit, exercise stress ECHO was ordered as patient with negative troponin's. Color flow doppler study revealed possible new wall motion abnormalities (new WMAs of mid inferoseptum (mild hypokinesis),  apical septum (moderately hypokinetic) and basal inferoseptum (severely hypokinetic)) therefore exercise stress echo could not be completed. Interventional cardiology was consulted for further evaluation who recommended patient to be sent to cath lab 6/25 as he has a previous history of PCI, current smoker with transient changes on EKG. Cath procedure was successfully completed with MANJINDER to dRCA, prox Cx and OM1. Will require dual-platelet therapy for one year following discharge and cardiac rehabilitation.

## 2018-06-22 NOTE — ASSESSMENT & PLAN NOTE
37 y.o. male with pmhx CAD (s/p LAD and D1 PCI in 2013), +FH CAD w/ father having MI @ age 45, current smoker (25 pack year), HTN, pAfib, HLD who presents to the ED c/o acute episode of burning, mild (3/10), non-radiating chest pain with associated dizziness, LUE numbness and vision changes.  Symptoms resolved before presentation.    During color flow doppler study patient was found to have new wall motion abnormalities, interventional cardiology consulted  - Suspect GERD as etiology since patient had recently eaten pizza.  Possible ACS in setting of multiple risk factors.   - Per patient, arm numbness similar to previous presentation with NSTEMI (albeit unilateral today), but no mention of arm numbness per chart review.   - Initial troponin and BNP negative. Will trend.   - CXR WNL.     - EKG x2 shows inverted inferior T waves. Patient asymptomatic with poor story.  Last EKG was from 2014 and could have flipped T waves since then for reasons other than ischemia. Discussed EKG with Dr. Santoyo.   - HEART score 4  - Will repeat ECHO as last was 9/3/2014  - Hg A1c 5.1  - consider stress echo or cardiology consult if troponins increase or CP returns.  - given  in ED.  Continue ASA daily.  - will need close cardiology f/u as he is poor historian and continues to smoke despite cardiac hx.

## 2018-06-22 NOTE — SUBJECTIVE & OBJECTIVE
Interval History: Patient resting in bed, no acute distress.     Review of Systems   Constitutional: Negative for chills and fever.   HENT: Negative for ear pain and sore throat.    Eyes: Negative for pain and visual disturbance.   Respiratory: Negative for cough and shortness of breath.    Cardiovascular: Negative for chest pain (resolved), palpitations and leg swelling.   Gastrointestinal: Negative for abdominal pain, diarrhea, nausea and vomiting.   Endocrine: Negative for heat intolerance and polydipsia.   Genitourinary: Negative for difficulty urinating, dysuria, frequency and urgency.   Musculoskeletal: Positive for back pain (chronic). Negative for gait problem.   Skin: Negative for rash and wound.   Neurological: Negative for dizziness (resolved), weakness and numbness (LUE; resolved).   Psychiatric/Behavioral: Negative for confusion. The patient is not nervous/anxious.      Objective:     Vital Signs (Most Recent):  Temp: 97.8 °F (36.6 °C) (06/22/18 1619)  Pulse: 70 (06/22/18 1619)  Resp: 16 (06/22/18 1619)  BP: (!) 122/59 (06/22/18 1619)  SpO2: 98 % (06/22/18 1619) Vital Signs (24h Range):  Temp:  [96.6 °F (35.9 °C)-97.8 °F (36.6 °C)] 97.8 °F (36.6 °C)  Pulse:  [56-91] 70  Resp:  [16-20] 16  SpO2:  [94 %-98 %] 98 %  BP: (111-158)/(56-81) 122/59     Weight: 109 kg (240 lb 4.8 oz)  Body mass index is 36.54 kg/m².    Intake/Output Summary (Last 24 hours) at 06/22/18 1816  Last data filed at 06/22/18 0101   Gross per 24 hour   Intake                0 ml   Output                0 ml   Net                0 ml      Physical Exam   Constitutional: He is oriented to person, place, and time. He appears well-developed and well-nourished.   HENT:   Head: Normocephalic and atraumatic.   Eyes: Conjunctivae and EOM are normal. Pupils are equal, round, and reactive to light.   Neck: Normal range of motion. Neck supple.   Cardiovascular: Normal rate, regular rhythm, normal heart sounds and intact distal pulses.     Pulmonary/Chest: Effort normal. No respiratory distress. He has decreased breath sounds in the right upper field, the right middle field, the left upper field and the left middle field. He has no wheezes. He exhibits no tenderness.   Abdominal: Soft. Bowel sounds are normal. He exhibits no distension. There is no tenderness.   Musculoskeletal: Normal range of motion.   Neurological: He is alert and oriented to person, place, and time.   BUE STR 5/5 without numbness/tingling   Skin: Skin is warm and dry.   Psychiatric: He has a normal mood and affect. His behavior is normal. Judgment and thought content normal.   Nursing note and vitals reviewed.      Significant Labs: All pertinent labs within the past 24 hours have been reviewed.    Significant Imaging: I have reviewed all pertinent imaging results/findings within the past 24 hours.

## 2018-06-22 NOTE — ED PROVIDER NOTES
"Encounter Date: 6/21/2018    SCRIBE #1 NOTE: I, Nancy Greene, am scribing for, and in the presence of,  Dr. Boyd. I have scribed the entire note.       History     Chief Complaint   Patient presents with    Chest Pain     Pt presents to ED stating he had an episode of mild chest pain, left arm tingling, dizziness about 20 minutes ago that only lasted about 1 minute. Hx of MI with 2 stents placed. Denies chest pain and SOB at this time. States the tingling in his arm felt exactly like it did when he had previous NSTEMI     Time patient was seen by the provider: 8:34 PM      The patient is a 37 y.o. male with co-morbidities including: CAD, HTN, and heart artery stent who presents to the ED with a complaint of CP.  Pt notes he had an episode of mild CP, LUE tingling, and dizziness 20 min PTA around 5:30 pm that lasted for one minute.  Pt with hx MI and 2 stent placement.  Denies CP and SOB in the ED.  States his LUE tingling felt similar to previous NSTEMI episode in 2013.  Describes episode as "almost like a heart burn", but pressure and tight/burning sensation.   States LUE went numb similar to previous MI symptoms.  Denies jaw pain or neck pain.  States prior to this episode he was watching TV and when he stood up he started to get dizzy with the CP.  Symptoms have resolved.  Denies recent travel.  Pt is on baby aspirin.  Onset was sudden, associated with midsternal chest pain without radiation.  Associated with left arm numbness and pain. Not aggravated by exertion.  No other associated alleviating factors.  Denies any fevers, chills, nausea, vomiting. Denies any back, neck, abdominal, or leg pain. Did have associated lightheadedness and dizziness at the time of the chest pain.      The history is provided by the patient and medical records.     Review of patient's allergies indicates:  No Known Allergies  Past Medical History:   Diagnosis Date    Anxiety     Carbon monoxide poisoning     Caused a seizure "    Coronary artery disease     History of heart artery stent 2013    Hypertension     Obesity      Past Surgical History:   Procedure Laterality Date    CORONARY ANGIOPLASTY       Family History   Problem Relation Age of Onset    Heart disease Mother     Heart disease Father      Social History   Substance Use Topics    Smoking status: Current Every Day Smoker     Packs/day: 0.50     Years: 20.00     Types: Cigarettes     Last attempt to quit: 2/10/2009    Smokeless tobacco: Never Used    Alcohol use Yes      Comment: occasional     Review of Systems   Constitutional: Negative for chills and fever.   HENT: Negative for ear pain.         - jaw pain   Eyes: Negative for pain and discharge.   Respiratory: Negative for shortness of breath and wheezing.    Cardiovascular: Positive for chest pain (resolved). Negative for leg swelling.   Gastrointestinal: Negative for abdominal pain and blood in stool.   Genitourinary: Negative for dysuria and hematuria.   Musculoskeletal: Negative for neck pain and neck stiffness.        - lower extremity pain   Skin: Negative for rash.   Neurological: Positive for dizziness (resolved), light-headedness and numbness (to LUE (resolved)).        + tingling to LUE (resolved)       Physical Exam     Initial Vitals [06/21/18 1920]   BP Pulse Resp Temp SpO2   (!) 158/77 91 20 97.5 °F (36.4 °C) 96 %      MAP       --         Physical Exam    Nursing note and vitals reviewed.    Gen/Constitutional: Interactive. No acute distress  Head: Normocephalic, Atraumatic  Neck: supple, no masses or LAD  Eyes: PERRLA, conjunctiva clear  Ears, Nose and Throat: No rhinorrhea or stridor.  Cardiac: Reg Rhythm, No murmur  Pulmonary: CTA Bilat, no wheezes, rhonchi, rales.  GI: Abdomen soft, non-tender, non-distended; no rebound or guarding  : No CVA tenderness.  Musculoskeletal: Extremities warm, well perfused, no erythema, no edema  Skin: No rashes  Neuro: Alert and Oriented x 3; No focal motor or  sensory deficits.    Psych: Normal affect      ED Course   Procedures  Labs Reviewed   CBC W/ AUTO DIFFERENTIAL - Abnormal; Notable for the following:        Result Value    RBC 4.50 (*)     MCH 32.0 (*)     All other components within normal limits   COMPREHENSIVE METABOLIC PANEL - Abnormal; Notable for the following:     CO2 22 (*)     Glucose 118 (*)     All other components within normal limits   TROPONIN I   B-TYPE NATRIURETIC PEPTIDE   TROPONIN I     EKG Readings: (Independently Interpreted)   7:25 PM  NSR, heart rate of 78, nonspecific T wave abnormality in the lateral leads, no STEMI.       Imaging Results          X-Ray Chest PA And Lateral (Final result)  Result time 06/21/18 21:31:48    Final result by Chuy Paul MD (06/21/18 21:31:48)                 Impression:      No acute cardiopulmonary process.      Electronically signed by: Chuy Paul MD  Date:    06/21/2018  Time:    21:31             Narrative:    EXAMINATION:  XR CHEST PA AND LATERAL    CLINICAL HISTORY:  Chest Pain;    TECHNIQUE:  PA and lateral views of the chest were performed.    COMPARISON:  September 3, 2014.    FINDINGS:  There is no consolidation, effusion, or pneumothorax.    Cardiomediastinal silhouette is unremarkable.    Regional osseous structures are unremarkable.                                 Medical Decision Making:   History:   Old Medical Records: I decided to obtain old medical records.  Initial Assessment:   37 y.o. male presenting with CP.  My differential diagnosis includes unstable angina, acute MI, aortic dissection, PE, pericarditis, PNA.   Differential Diagnosis:   Evaluated immediately upon arrival, with initial ECG without signs of acute ischemia.  Afebrile and vital signs stable. Initial ECG with no STEMI, telemetry monitoring without ST changes.  IV line placed, labs were drawn.  Initial troponin not elevated.  Given is moderate to high cardiac risk score and similar symptoms to previous MI, the  patient was admitted to observation status for ongoing delta troponin, close monitoring and telemetry.  Chest x-ray without signs of dissection or pneumothorax.  No fever, or chest x-ray findings to suggest consolidation, doubt pneumonia.  EKG without signs of pericarditis and no significant clinical history and findings.  Doubt PE given clinical history and findings.  Patient agreeable to observation plan, remained hemodynamically stable throughout his ED evaluation.  Independently Interpreted Test(s):   I have ordered and independently interpreted EKG Reading(s) - see prior notes  Clinical Tests:   Lab Tests: Ordered and Reviewed  Radiological Study: Ordered and Reviewed  Medical Tests: Ordered and Reviewed            Scribe Attestation:   Scribe #1: I performed the above scribed service and the documentation accurately describes the services I performed. I attest to the accuracy of the note.    I, Dr. Alexis Boyd, personally performed the services described in this documentation. All medical record entries made by the scribe were at my direction and in my presence.  I have reviewed the chart and agree that the record reflects my personal performance and is accurate and complete.              Clinical Impression:   The encounter diagnosis was Chest pain.      Disposition:   Disposition: Placed in Observation  Condition: Stable       Alexis Boyd DO  Dept of Emergency Medicine   [facility]  Spectralink: [id number]                   Alexis Boyd,   06/21/18 2234       Alexis Boyd,   06/21/18 2236

## 2018-06-22 NOTE — ASSESSMENT & PLAN NOTE
Stable  - patient was not aware of this diagnosis.  Pt is also poor historian.  - Stress ECHO 9/3/2014 shows mildly depressed systolic function (45%), normal diastolic function.  Also showed non-specific failure to augment in segments with hypokinesis at rest, which cardiology did not believe was d/t ischemia.    - mild bibasilar rales, which may be atelectasis, otherwise euvolemic.  - BNP negative in setting of obesity, CXR with no evidence of edema  - not currently on any diuretics.  EF logan over the course of a year after diagnosed with NSTEMI (EF 25 at the time).  - Will recheck ECHO   - continue ACEI  - strict I/O, daily weights, tele

## 2018-06-23 PROBLEM — R07.89 ATYPICAL CHEST PAIN: Status: RESOLVED | Noted: 2018-06-21 | Resolved: 2018-06-23

## 2018-06-23 LAB
ANION GAP SERPL CALC-SCNC: 11 MMOL/L
BASOPHILS # BLD AUTO: 0.05 K/UL
BASOPHILS NFR BLD: 0.6 %
BUN SERPL-MCNC: 21 MG/DL
CALCIUM SERPL-MCNC: 9.6 MG/DL
CHLORIDE SERPL-SCNC: 103 MMOL/L
CO2 SERPL-SCNC: 24 MMOL/L
CREAT SERPL-MCNC: 1.2 MG/DL
DIFFERENTIAL METHOD: ABNORMAL
EOSINOPHIL # BLD AUTO: 0.2 K/UL
EOSINOPHIL NFR BLD: 2.1 %
ERYTHROCYTE [DISTWIDTH] IN BLOOD BY AUTOMATED COUNT: 12.2 %
EST. GFR  (AFRICAN AMERICAN): >60 ML/MIN/1.73 M^2
EST. GFR  (NON AFRICAN AMERICAN): >60 ML/MIN/1.73 M^2
GLUCOSE SERPL-MCNC: 102 MG/DL
HCT VFR BLD AUTO: 41.8 %
HGB BLD-MCNC: 13.9 G/DL
IMM GRANULOCYTES # BLD AUTO: 0.02 K/UL
IMM GRANULOCYTES NFR BLD AUTO: 0.3 %
LYMPHOCYTES # BLD AUTO: 2.3 K/UL
LYMPHOCYTES NFR BLD: 28.3 %
MCH RBC QN AUTO: 30.9 PG
MCHC RBC AUTO-ENTMCNC: 33.3 G/DL
MCV RBC AUTO: 93 FL
MONOCYTES # BLD AUTO: 0.5 K/UL
MONOCYTES NFR BLD: 6.5 %
NEUTROPHILS # BLD AUTO: 5 K/UL
NEUTROPHILS NFR BLD: 62.2 %
NRBC BLD-RTO: 0 /100 WBC
PLATELET # BLD AUTO: 236 K/UL
PMV BLD AUTO: 10.4 FL
POTASSIUM SERPL-SCNC: 4.1 MMOL/L
RBC # BLD AUTO: 4.5 M/UL
SODIUM SERPL-SCNC: 138 MMOL/L
WBC # BLD AUTO: 7.99 K/UL

## 2018-06-23 PROCEDURE — 85025 COMPLETE CBC W/AUTO DIFF WBC: CPT

## 2018-06-23 PROCEDURE — 63600175 PHARM REV CODE 636 W HCPCS: Performed by: PHYSICIAN ASSISTANT

## 2018-06-23 PROCEDURE — 36415 COLL VENOUS BLD VENIPUNCTURE: CPT

## 2018-06-23 PROCEDURE — 99223 1ST HOSP IP/OBS HIGH 75: CPT | Mod: ,,, | Performed by: PHYSICIAN ASSISTANT

## 2018-06-23 PROCEDURE — 25000003 PHARM REV CODE 250: Performed by: PHYSICIAN ASSISTANT

## 2018-06-23 PROCEDURE — 80048 BASIC METABOLIC PNL TOTAL CA: CPT

## 2018-06-23 PROCEDURE — 25000003 PHARM REV CODE 250: Performed by: INTERNAL MEDICINE

## 2018-06-23 PROCEDURE — 11000001 HC ACUTE MED/SURG PRIVATE ROOM

## 2018-06-23 RX ORDER — CLOPIDOGREL BISULFATE 75 MG/1
75 TABLET ORAL DAILY
Status: DISCONTINUED | OUTPATIENT
Start: 2018-06-24 | End: 2018-06-25 | Stop reason: HOSPADM

## 2018-06-23 RX ORDER — CLOPIDOGREL 300 MG/1
300 TABLET, FILM COATED ORAL ONCE
Status: COMPLETED | OUTPATIENT
Start: 2018-06-23 | End: 2018-06-23

## 2018-06-23 RX ADMIN — ASPIRIN 81 MG: 81 TABLET, COATED ORAL at 08:06

## 2018-06-23 RX ADMIN — SIMVASTATIN 40 MG: 20 TABLET, FILM COATED ORAL at 09:06

## 2018-06-23 RX ADMIN — CLOPIDOGREL BISULFATE 300 MG: 300 TABLET, FILM COATED ORAL at 02:06

## 2018-06-23 RX ADMIN — CLONAZEPAM 1 MG: 1 TABLET ORAL at 09:06

## 2018-06-23 RX ADMIN — LISINOPRIL 10 MG: 10 TABLET ORAL at 08:06

## 2018-06-23 RX ADMIN — ENOXAPARIN SODIUM 40 MG: 100 INJECTION SUBCUTANEOUS at 05:06

## 2018-06-23 RX ADMIN — HYDROCODONE BITARTRATE AND ACETAMINOPHEN 1 TABLET: 5; 325 TABLET ORAL at 08:06

## 2018-06-23 NOTE — ASSESSMENT & PLAN NOTE
H/o PCI s/p MANJINDER 3x12 mm and 3x18 mm to prox LAD and POBA (2.5x12 mm) to D1 in 02/2013,    Current smoker (25 pack year)  Trops -ve x3.  EKG showed transient TWIs in inferior leads   Echo here shows new WMAs of mid inferoseptum (mild hypokinesis),  apical septum (moderately hypokinetic) and basal inferoseptum (severely hypokinetic)    Will proceed w/ [facility] on Monday  - Cardiac Catheterization with probable PCI  - Anti-platelet Therapy:  mg load x1, Plavix 300 mg load x1 and then 75 mg QD   - Access: Pt prefers R Radial approach  - Catheters: Keshav  - Serum Creatinine/CrCl: 1.2/>60.0  - Allergies: No shellfish/Iodine allergy  - Pre-Hydration: 3 cc/kg/hr IV, continuous, for 1 hour, Pre-Procedure  - Pre-Op Med: Diphenhydramine (Benadryl) 50 mg, Oral, Once, Pre-Procedure   - Pt is a MANJINDER candidate and understands the importance of taking Ticagrelor 90 mg BID or Plavix 75 mg daily or Prasugrel 10 mg daily for at least one year, understands that in case of receiving a drug coated stent the failure to comply with dual anti-platelet therapy is likely to result in stent clotting, heart attack and death.   - The patient understands the risks and benefits and wishes to go ahead with the procedure.  - All patient's questions were answered.  - The risks, benefits & alternatives of the procedure were explained to the patient.   - The risks of coronary angiography include but are not limited to: Bleeding, infection, heart rhythm abnormalities, allergic reactions, kidney injury requiring dilaysis, limb loss, stroke and death.   - Should stenting be indicated, the patient has agreed to dual anti-platelet therapy for 1-consecutive year with a drug-eluting stent and a minimum of 1-month with the use of a bare metal stent.   - Additionally, pt is aware that non-compliance is likely to result in stent clotting with heart attack, heart failure, and/or death.  - The risks of moderate sedation include hypotension, respiratory  depression, arrhythmias, bronchospasm, & death.   - Informed consent was obtained & the patient is agreeable to proceed with the procedure.  - This patient was discussed with the attending interventional cardiologist who agrees with the above assessment & plan.

## 2018-06-23 NOTE — PLAN OF CARE
Problem: Patient Care Overview  Goal: Plan of Care Review  Outcome: Ongoing (interventions implemented as appropriate)  Pt's VSS, NAD noted. Pt is sinus rhythm on cardiac monitor and becomes sinus tachycardic when ambulating up to HR of 108. Pt aware of change from NPO to cardiac diet. Pt seen by Cardiology and aware of future angiogram. Pt ambulates frequently independently with even gait. Pt states his lower chronic back pain is controlled to his baseline of 3/10 with Hydrocodone-Acetaminophen. Bed locked in lowest position, side rails upx2, call bell within reach, nonskid socks on pt. Pt refused evelia/scds.

## 2018-06-23 NOTE — CONSULTS
[facility]  Interventional Cardiology  Consult Note    Patient Name: Cody Csatro  MRN: [id number]  Admission Date: 6/21/2018  Hospital Length of Stay: 0 days  Code Status: Full Code   Attending Provider: Yomaira Vargas MD  Consulting Provider: Trena Pulido MD  Primary Care Physician: Abad Patterson MD  Principal Problem:Unstable angina    Patient information was obtained from patient, past medical records and ER records.     Consults  Subjective:     Chief Complaint:  [place]     HPI:  This is a 36 y/o M w/ a PMHx sig for CAD s/p MANJINDER 3x12 mm and 3x18 mm to prox LAD and POBA (2.5x12 mm) to D1 in 02/2013,  current smoker (25 pack year), HTN, HLD, PAF (not on AC) who presents to the ED w/ atypical CP.    He had an episode of burning, mild, non-radiating chest pain with associated dizziness, LUE numbness and vision changes that started around 5:30 pm on Thursday 06/21. It was not a/w exertion. Sxs resolved without intervention. He had similar sxs prior top his NSTEMI in 02/2013 which led to his PCI. In the hospital he has remained CP free, trops -ve x3. EKG showed transient TWIs in inferior leads. Echo here shows new WMAs of mid inferoseptum (mild hypokinesis),  apical septum (moderately hypokinetic) and basal inferoseptum (severely hypokinetic).      Past Medical History:   Diagnosis Date    Anxiety     Carbon monoxide poisoning     Caused a seizure    Coronary artery disease     History of heart artery stent 2013    Hypertension     Obesity        Past Surgical History:   Procedure Laterality Date    CORONARY ANGIOPLASTY         Review of patient's allergies indicates:  No Known Allergies    PTA Medications   Medication Sig    aspirin (ECOTRIN) 81 MG EC tablet Take 81 mg by mouth once daily.    HYDROCODONE/ACETAMINOPHEN (NORCO ORAL) Take 7.5 mg by mouth every 6 (six) hours as needed.     lisinopril (PRINIVIL,ZESTRIL) 5 MG tablet Take 10 mg by mouth once daily.    simvastatin (ZOCOR) 40 MG  tablet Take 40 mg by mouth every evening.    clonazepam (KLONOPIN) 1 MG tablet Take 1 mg by mouth 2 (two) times daily as needed.     Family History     Problem Relation (Age of Onset)    Heart disease Mother, Father        Social History Main Topics    Smoking status: Current Every Day Smoker     Packs/day: 1.00     Years: 20.00     Types: Cigarettes     Last attempt to quit: 2/10/2009    Smokeless tobacco: Never Used    Alcohol use Yes      Comment: occasional. none in 2 months    Drug use: Yes     Types: Marijuana      Comment: occasional marijuana use.last 2 weeks ago    Sexual activity: Yes     Partners: Female     ROS   Constitution: negative for - fever, chills, weight loss or weight gain  HENT: negative for - sore throat, rhinorrhea, or headache  Eyes: negative for - blurred or double vision  Cardiovascular: see above  Pulmonary: see above  Gastrointestinal: negative for - abdominal pain, nausea, vomiting, or diarrhea  : negative for - dysuria  Neurological: negative for - focal weakness or sensory changes    Objective:     Vital Signs (Most Recent):  Temp: 98.2 °F (36.8 °C) (06/23/18 1201)  Pulse: 83 (06/23/18 1201)  Resp: 18 (06/23/18 1201)  BP: 123/77 (06/23/18 1201)  SpO2: 97 % (06/23/18 1201) Vital Signs (24h Range):  Temp:  [97.7 °F (36.5 °C)-98.2 °F (36.8 °C)] 98.2 °F (36.8 °C)  Pulse:  [] 83  Resp:  [16-18] 18  SpO2:  [93 %-98 %] 97 %  BP: (104-128)/(56-79) 123/77     Weight: 109 kg (240 lb 4.8 oz)  Body mass index is 36.54 kg/m².    SpO2: 97 %  O2 Device (Oxygen Therapy): room air      Intake/Output Summary (Last 24 hours) at 06/23/18 1304  Last data filed at 06/23/18 1029   Gross per 24 hour   Intake                0 ml   Output              400 ml   Net             -400 ml       Lines/Drains/Airways     Peripheral Intravenous Line                 Peripheral IV - Single Lumen 06/21/18 2053 Left Forearm 1 day                Physical Exam   /77 (BP Location: Right arm, Patient  "Position: Lying)   Pulse 83   Temp 98.2 °F (36.8 °C) (Oral)   Resp 18   Ht 5' 8" (1.727 m)   Wt 109 kg (240 lb 4.8 oz)   SpO2 97%   BMI 36.54 kg/m²        Constitutional: No apparent distress, conversant  HEENT: Sclera anicteric, extraocular movements intact  Neck: No jugular venous distension, no carotid bruits  Cardiac: Regular rate and rhythm, no JVD,  no murmurs rubs or gallops, normal S1/S2, no LE edema  Pulm: Clear to auscultation bilaterally, no wheezes, rales, or ronchi  GI: Abdomen soft, nontender, nondistended  Skin: No ecchymosis, erythema, or ulcers  Psych: Alert and oriented to person place location, appropriate affect  Neuro: No focal deficits  Vascular:   RIGHT LEFT   RADIAL 2+ 2+   ULNAR 2+ 2+   BRACHIAL 2+ 2+   FEMORAL 2+ 2+   DP 2+ 2+   TP 2+ 2+   AMINAH'S TEST Normal Normal   BARBEAU TEST           Significant Labs: All pertinent lab results from the last 24 hours have been reviewed.    Significant Imaging: Echocardiogram:   2D echo with color flow doppler:   Results for orders placed or performed during the hospital encounter of 06/21/18   2D echo with color flow doppler   Result Value Ref Range    EF 50 55 - 65    Diastolic Dysfunction No      Assessment and Plan:     * Unstable angina    H/o PCI s/p MANJINDER 3x12 mm and 3x18 mm to prox LAD and POBA (2.5x12 mm) to D1 in 02/2013    Current smoker (25 pack year)  Trops -ve x3  EKG showed transient TWIs in inferior leads   Echo here shows new WMAs of mid inferoseptum (mild hypokinesis),  apical septum (moderately hypokinetic) and basal inferoseptum (severely hypokinetic)    Will proceed w/ [facility] on Monday  - Cardiac Catheterization with probable PCI  - Anti-platelet Therapy:  mg load x1, Plavix 300 mg load x1 and then 75 mg QD   - Access: Pt prefers R Radial approach  - Catheters: Keshav  - Serum Creatinine/CrCl: 1.2/>60.0  - Allergies: No shellfish/Iodine allergy  - Pre-Hydration: 3 cc/kg/hr IV, continuous, for 1 hour, Pre-Procedure  - Pre-Op " Med: Diphenhydramine (Benadryl) 50 mg, Oral, Once, Pre-Procedure   - Pt is a MANJINDER candidate and understands the importance of taking Ticagrelor 90 mg BID or Plavix 75 mg daily or Prasugrel 10 mg daily for at least one year, understands that in case of receiving a drug coated stent the failure to comply with dual anti-platelet therapy is likely to result in stent clotting, heart attack and death.   - The patient understands the risks and benefits and wishes to go ahead with the procedure.  - All patient's questions were answered.  - The risks, benefits & alternatives of the procedure were explained to the patient.   - The risks of coronary angiography include but are not limited to: Bleeding, infection, heart rhythm abnormalities, allergic reactions, kidney injury requiring dilaysis, limb loss, stroke and death.   - Should stenting be indicated, the patient has agreed to dual anti-platelet therapy for 1-consecutive year with a drug-eluting stent and a minimum of 1-month with the use of a bare metal stent.   - Additionally, pt is aware that non-compliance is likely to result in stent clotting with heart attack, heart failure, and/or death.  - The risks of moderate sedation include hypotension, respiratory depression, arrhythmias, bronchospasm, & death.   - Informed consent was obtained & the patient is agreeable to proceed with the procedure.  - This patient was discussed with the attending interventional cardiologist who agrees with the above assessment & plan.              VTE Risk Mitigation         Ordered     enoxaparin injection 40 mg  Daily      06/21/18 2242     IP VTE HIGH RISK PATIENT  Once      06/21/18 2242     Place MARIANN hose  Until discontinued      06/21/18 2242     Place sequential compression device  Until discontinued      06/21/18 2242          Thank you for your consult.     Trena Pulido MD  Interventional Cardiology   [facility]

## 2018-06-23 NOTE — PLAN OF CARE
Problem: Patient Care Overview  Goal: Plan of Care Review  Outcome: Ongoing (interventions implemented as appropriate)  Patient remained free of falls this shift. He is AAOx4 and VS's are stable. His mother called at 2300 and 0500 to check on him. He is independent and even walks to the lobby so he can make cell phone calls. Wife should be back to listen to the doctor's on what they have planned for the patient.

## 2018-06-23 NOTE — HPI
This is a 36 y/o M w/ a PMHx sig for CAD s/p MANJINDER 3x12 mm and 3x18 mm to prox LAD and POBA (2.5x12 mm) to D1 in 02/2013,  current smoker (25 pack year), HTN, HLD, PAF (not on AC) who presents to the ED w/ atypical CP.    He had an episode of burning, mild, non-radiating chest pain with associated dizziness, LUE numbness and vision changes that started around 5:30 pm on Thursday 06/21. It was not a/w exertion. Sxs resolved without intervention. He had similar sxs prior top his NSTEMI in 02/2013 which led to his PCI. In the hospital he has remained CP free, trops -ve x3. EKG showed transient TWIs in inferior leads. Echo here shows new WMAs of mid inferoseptum (mild hypokinesis),  apical septum (moderately hypokinetic) and basal inferoseptum (severely hypokinetic).

## 2018-06-23 NOTE — SUBJECTIVE & OBJECTIVE
Past Medical History:   Diagnosis Date    Anxiety     Carbon monoxide poisoning     Caused a seizure    Coronary artery disease     History of heart artery stent 2013    Hypertension     Obesity        Past Surgical History:   Procedure Laterality Date    CORONARY ANGIOPLASTY         Review of patient's allergies indicates:  No Known Allergies    PTA Medications   Medication Sig    aspirin (ECOTRIN) 81 MG EC tablet Take 81 mg by mouth once daily.    HYDROCODONE/ACETAMINOPHEN (NORCO ORAL) Take 7.5 mg by mouth every 6 (six) hours as needed.     lisinopril (PRINIVIL,ZESTRIL) 5 MG tablet Take 10 mg by mouth once daily.    simvastatin (ZOCOR) 40 MG tablet Take 40 mg by mouth every evening.    clonazepam (KLONOPIN) 1 MG tablet Take 1 mg by mouth 2 (two) times daily as needed.     Family History     Problem Relation (Age of Onset)    Heart disease Mother, Father        Social History Main Topics    Smoking status: Current Every Day Smoker     Packs/day: 1.00     Years: 20.00     Types: Cigarettes     Last attempt to quit: 2/10/2009    Smokeless tobacco: Never Used    Alcohol use Yes      Comment: occasional. none in 2 months    Drug use: Yes     Types: Marijuana      Comment: occasional marijuana use.last 2 weeks ago    Sexual activity: Yes     Partners: Female     ROS   Constitution: negative for - fever, chills, weight loss or weight gain  HENT: negative for - sore throat, rhinorrhea, or headache  Eyes: negative for - blurred or double vision  Cardiovascular: see above  Pulmonary: see above  Gastrointestinal: negative for - abdominal pain, nausea, vomiting, or diarrhea  : negative for - dysuria  Neurological: negative for - focal weakness or sensory changes    Objective:     Vital Signs (Most Recent):  Temp: 98.2 °F (36.8 °C) (06/23/18 1201)  Pulse: 83 (06/23/18 1201)  Resp: 18 (06/23/18 1201)  BP: 123/77 (06/23/18 1201)  SpO2: 97 % (06/23/18 1201) Vital Signs (24h Range):  Temp:  [97.7 °F (36.5  "°C)-98.2 °F (36.8 °C)] 98.2 °F (36.8 °C)  Pulse:  [] 83  Resp:  [16-18] 18  SpO2:  [93 %-98 %] 97 %  BP: (104-128)/(56-79) 123/77     Weight: 109 kg (240 lb 4.8 oz)  Body mass index is 36.54 kg/m².    SpO2: 97 %  O2 Device (Oxygen Therapy): room air      Intake/Output Summary (Last 24 hours) at 06/23/18 1304  Last data filed at 06/23/18 1029   Gross per 24 hour   Intake                0 ml   Output              400 ml   Net             -400 ml       Lines/Drains/Airways     Peripheral Intravenous Line                 Peripheral IV - Single Lumen 06/21/18 2053 Left Forearm 1 day                Physical Exam   /77 (BP Location: Right arm, Patient Position: Lying)   Pulse 83   Temp 98.2 °F (36.8 °C) (Oral)   Resp 18   Ht 5' 8" (1.727 m)   Wt 109 kg (240 lb 4.8 oz)   SpO2 97%   BMI 36.54 kg/m²      Constitutional: No apparent distress, conversant  HEENT: Sclera anicteric, extraocular movements intact  Neck: No jugular venous distension, no carotid bruits  Cardiac: Regular rate and rhythm, no JVD,  no murmurs rubs or gallops, normal S1/S2, no LE edema  Pulm: Clear to auscultation bilaterally, no wheezes, rales, or ronchi  GI: Abdomen soft, nontender, nondistended  Skin: No ecchymosis, erythema, or ulcers  Psych: Alert and oriented to person place location, appropriate affect  Neuro: No focal deficits  Vascular:   RIGHT LEFT   RADIAL 2+ 2+   ULNAR 2+ 2+   BRACHIAL 2+ 2+   FEMORAL 2+ 2+   DP 2+ 2+   TP 2+ 2+   AMINAH'S TEST Normal Normal   BARBEAU TEST           Significant Labs: All pertinent lab results from the last 24 hours have been reviewed.    Significant Imaging: Echocardiogram:   2D echo with color flow doppler:   Results for orders placed or performed during the hospital encounter of 06/21/18   2D echo with color flow doppler   Result Value Ref Range    EF 50 55 - 65    Diastolic Dysfunction No      "

## 2018-06-24 LAB
ANION GAP SERPL CALC-SCNC: 8 MMOL/L
BASOPHILS # BLD AUTO: 0.04 K/UL
BASOPHILS NFR BLD: 0.5 %
BUN SERPL-MCNC: 17 MG/DL
CALCIUM SERPL-MCNC: 9.3 MG/DL
CHLORIDE SERPL-SCNC: 105 MMOL/L
CO2 SERPL-SCNC: 25 MMOL/L
CREAT SERPL-MCNC: 1.1 MG/DL
DIFFERENTIAL METHOD: ABNORMAL
EOSINOPHIL # BLD AUTO: 0.2 K/UL
EOSINOPHIL NFR BLD: 2 %
ERYTHROCYTE [DISTWIDTH] IN BLOOD BY AUTOMATED COUNT: 12.3 %
EST. GFR  (AFRICAN AMERICAN): >60 ML/MIN/1.73 M^2
EST. GFR  (NON AFRICAN AMERICAN): >60 ML/MIN/1.73 M^2
GLUCOSE SERPL-MCNC: 110 MG/DL
HCT VFR BLD AUTO: 40.4 %
HGB BLD-MCNC: 13.7 G/DL
IMM GRANULOCYTES # BLD AUTO: 0.02 K/UL
IMM GRANULOCYTES NFR BLD AUTO: 0.2 %
LYMPHOCYTES # BLD AUTO: 2.2 K/UL
LYMPHOCYTES NFR BLD: 27.3 %
MCH RBC QN AUTO: 31.5 PG
MCHC RBC AUTO-ENTMCNC: 33.9 G/DL
MCV RBC AUTO: 93 FL
MONOCYTES # BLD AUTO: 0.6 K/UL
MONOCYTES NFR BLD: 7.5 %
NEUTROPHILS # BLD AUTO: 5 K/UL
NEUTROPHILS NFR BLD: 62.5 %
NRBC BLD-RTO: 0 /100 WBC
PLATELET # BLD AUTO: 234 K/UL
PMV BLD AUTO: 10.2 FL
POTASSIUM SERPL-SCNC: 4 MMOL/L
RBC # BLD AUTO: 4.35 M/UL
SODIUM SERPL-SCNC: 138 MMOL/L
WBC # BLD AUTO: 8.02 K/UL

## 2018-06-24 PROCEDURE — 80048 BASIC METABOLIC PNL TOTAL CA: CPT

## 2018-06-24 PROCEDURE — 36415 COLL VENOUS BLD VENIPUNCTURE: CPT

## 2018-06-24 PROCEDURE — 85025 COMPLETE CBC W/AUTO DIFF WBC: CPT

## 2018-06-24 PROCEDURE — 25000003 PHARM REV CODE 250: Performed by: PHYSICIAN ASSISTANT

## 2018-06-24 PROCEDURE — 99232 SBSQ HOSP IP/OBS MODERATE 35: CPT | Mod: ,,, | Performed by: PHYSICIAN ASSISTANT

## 2018-06-24 PROCEDURE — 11000001 HC ACUTE MED/SURG PRIVATE ROOM

## 2018-06-24 PROCEDURE — 25000003 PHARM REV CODE 250: Performed by: INTERNAL MEDICINE

## 2018-06-24 PROCEDURE — 63600175 PHARM REV CODE 636 W HCPCS: Performed by: PHYSICIAN ASSISTANT

## 2018-06-24 RX ADMIN — HYDROCODONE BITARTRATE AND ACETAMINOPHEN 1 TABLET: 5; 325 TABLET ORAL at 05:06

## 2018-06-24 RX ADMIN — CLOPIDOGREL 75 MG: 75 TABLET, FILM COATED ORAL at 08:06

## 2018-06-24 RX ADMIN — ASPIRIN 81 MG: 81 TABLET, COATED ORAL at 08:06

## 2018-06-24 RX ADMIN — ENOXAPARIN SODIUM 40 MG: 100 INJECTION SUBCUTANEOUS at 05:06

## 2018-06-24 RX ADMIN — LISINOPRIL 10 MG: 10 TABLET ORAL at 08:06

## 2018-06-24 RX ADMIN — CLONAZEPAM 1 MG: 1 TABLET ORAL at 08:06

## 2018-06-24 RX ADMIN — SIMVASTATIN 40 MG: 20 TABLET, FILM COATED ORAL at 08:06

## 2018-06-24 RX ADMIN — HYDROCODONE BITARTRATE AND ACETAMINOPHEN 1 TABLET: 5; 325 TABLET ORAL at 04:06

## 2018-06-24 NOTE — SUBJECTIVE & OBJECTIVE
Interval History: Chest pain resolved. No events overnight.    Review of Systems   Constitutional: Negative for chills and fever.   HENT: Negative for ear pain and sore throat.    Eyes: Negative for pain and visual disturbance.   Respiratory: Negative for cough and shortness of breath.    Cardiovascular: Negative for chest pain (resolved), palpitations and leg swelling.   Gastrointestinal: Negative for abdominal pain, diarrhea, nausea and vomiting.   Endocrine: Negative for heat intolerance and polydipsia.   Genitourinary: Negative for difficulty urinating, dysuria, frequency and urgency.   Musculoskeletal: Positive for back pain (chronic). Negative for gait problem.   Skin: Negative for rash and wound.   Neurological: Negative for dizziness (resolved), weakness and numbness (LUE; resolved).   Psychiatric/Behavioral: Negative for confusion. The patient is not nervous/anxious.      Objective:     Vital Signs (Most Recent):  Temp: 98.8 °F (37.1 °C) (06/23/18 1639)  Pulse: 100 (06/23/18 1800)  Resp: 18 (06/23/18 1639)  BP: 115/67 (06/23/18 1639)  SpO2: (!) 92 % (06/23/18 1639) Vital Signs (24h Range):  Temp:  [97.7 °F (36.5 °C)-98.8 °F (37.1 °C)] 98.8 °F (37.1 °C)  Pulse:  [] 100  Resp:  [17-18] 18  SpO2:  [92 %-97 %] 92 %  BP: (104-128)/(56-79) 115/67     Weight: 109 kg (240 lb 4.8 oz)  Body mass index is 36.54 kg/m².    Intake/Output Summary (Last 24 hours) at 06/23/18 1959  Last data filed at 06/23/18 1700   Gross per 24 hour   Intake              480 ml   Output              400 ml   Net               80 ml      Physical Exam   Constitutional: He is oriented to person, place, and time. He appears well-developed and well-nourished.   HENT:   Head: Normocephalic and atraumatic.   Eyes: Conjunctivae and EOM are normal. Pupils are equal, round, and reactive to light.   Neck: Normal range of motion. Neck supple.   Cardiovascular: Normal rate, regular rhythm, normal heart sounds and intact distal pulses.     Pulmonary/Chest: Effort normal. No respiratory distress. He has decreased breath sounds. He has no wheezes. He exhibits no tenderness.   Abdominal: Soft. Bowel sounds are normal. He exhibits no distension. There is no tenderness.   Musculoskeletal: Normal range of motion.   Neurological: He is alert and oriented to person, place, and time.   BUE STR 5/5 without numbness/tingling   Skin: Skin is warm and dry.   Psychiatric: He has a normal mood and affect. His behavior is normal. Judgment and thought content normal.   Nursing note and vitals reviewed.      Significant Labs: All pertinent labs within the past 24 hours have been reviewed.    Significant Imaging: I have reviewed all pertinent imaging results/findings within the past 24 hours.

## 2018-06-24 NOTE — PROGRESS NOTES
[facility] Hospital Medicine  Progress Note    Patient Name: Cody Castro  MRN: [id number]  Patient Class: IP- Inpatient   Admission Date: 6/21/2018  Length of Stay: 1 days  Attending Physician: Yomaira Vargas MD  Primary Care Provider: Abad Patterson MD    [facility] Medicine Team: [facility] HOSP MED E Trista Boston PA-C    Subjective:     Principal Problem:Unstable angina    HPI:  This is a  37 y.o. male with pmhx CAD (s/p LAD and D1 PCI in 2013), +FH CAD w/ father having MI @ age 45, current smoker (25 pack year), HTN, HLD, pAfib (not on AC) who presents to the ED c/o acute episode of burning, mild (3/10), non-radiating chest pain with associated dizziness, LUE numbness and vision changes. Patient states that symptoms began at 530 pm when he stood up from sitting.  Symptoms resolved without intervention by the time he got into his car to come to the ED.  Patient reports CP similar to heartburn but had experienced BUE numbness prior to previous NSTEMI. Pt denies any SOB, CP, dizziness, jaw pain, numbness, tingling, vision changes, headache, f/c.  Pt denies CP and SOB in the ED.  States his LUE tingling felt similar to previous NSTEMI episode in 2013. Patient currently f/w PCP Dr. Patterson on [address] but does not currently f/w cardiology (preivously seen by [facility]).  Patient is poor historian and has poor understanding of medical dx.     Per chart review, admitted in 2013 with CP, SOB, diaphoresis and 2 episodes of syncope while walking in [facility] and dx with NSTEMI.  No changes on EKG but troponin trended up to 50.  ECHO on admit showed moderately depressed EF.  LHC revealed occluded LAD (collateral RCA flow) and 75% stenosis to prox D1.  MANJINDER x2 placed in LAD and PTA to diagnonal.  New onset A fib when initially presented, but spontaneously converted to NSR on day of admission.     In ED, intake labs unremarkable.  Initial troponin and BNP negative. CXR WNL.   Initial EKG shows SR with PVCs and  inverted T waves in inferior leads.  Repeat EKG shows NSR with sinus arrhythmia and inverted inferior T waves.     Hospital Course:  Cody Castro was admitted to observation for chest pain. Color flow doppler study revealed possible new wall motion abnormalities therefore exercise stress echo could not be completed. Interventional cardiology consulted and following, patient to cath lab Monday morning.     Interval History: No reported events overnight.    Review of Systems   Constitutional: Negative for chills and fever.   HENT: Negative for ear pain and sore throat.    Eyes: Negative for pain and visual disturbance.   Respiratory: Negative for cough and shortness of breath.    Cardiovascular: Negative for chest pain (resolved), palpitations and leg swelling.   Gastrointestinal: Negative for abdominal pain, diarrhea, nausea and vomiting.   Endocrine: Negative for heat intolerance and polydipsia.   Genitourinary: Negative for difficulty urinating, dysuria, frequency and urgency.   Musculoskeletal: Positive for back pain (chronic). Negative for gait problem.   Skin: Negative for rash and wound.   Neurological: Negative for dizziness (resolved), weakness and numbness (LUE; resolved).   Psychiatric/Behavioral: Negative for confusion. The patient is not nervous/anxious.      Objective:     Vital Signs (Most Recent):  Temp: 96.8 °F (36 °C) (06/24/18 0756)  Pulse: 75 (06/24/18 0756)  Resp: 17 (06/24/18 0756)  BP: 112/65 (06/24/18 0756)  SpO2: 96 % (06/24/18 0756) Vital Signs (24h Range):  Temp:  [96.1 °F (35.6 °C)-98.8 °F (37.1 °C)] 96.8 °F (36 °C)  Pulse:  [] 75  Resp:  [] 17  SpO2:  [92 %-97 %] 96 %  BP: (108-135)/(58-77) 112/65     Weight: 107.1 kg (236 lb 1.8 oz)  Body mass index is 35.9 kg/m².    Intake/Output Summary (Last 24 hours) at 06/24/18 1119  Last data filed at 06/24/18 1000   Gross per 24 hour   Intake              480 ml   Output              725 ml   Net             -245 ml      Physical Exam    Constitutional: He is oriented to person, place, and time. He appears well-developed and well-nourished.   HENT:   Head: Normocephalic and atraumatic.   Eyes: Conjunctivae and EOM are normal. Pupils are equal, round, and reactive to light.   Neck: Normal range of motion. Neck supple.   Cardiovascular: Normal rate, regular rhythm, normal heart sounds and intact distal pulses.    Pulmonary/Chest: Effort normal. No respiratory distress. He has decreased breath sounds. He has no wheezes. He exhibits no tenderness.   Abdominal: Soft. Bowel sounds are normal. He exhibits no distension. There is no tenderness.   Musculoskeletal: Normal range of motion.   Neurological: He is alert and oriented to person, place, and time.   BUE STR 5/5 without numbness/tingling   Skin: Skin is warm and dry.   Psychiatric: He has a normal mood and affect. His behavior is normal. Judgment and thought content normal.   Nursing note and vitals reviewed.      Significant Labs: All pertinent labs within the past 24 hours have been reviewed.    Significant Imaging: I have reviewed all pertinent imaging results/findings within the past 24 hours.    Assessment/Plan:      * Unstable angina    37 y.o. male with pmhx CAD (s/p LAD and D1 PCI in 2013), +FH CAD w/ father having MI @ age 45, current smoker (25 pack year), HTN, pAfib, HLD who presents to the ED c/o acute episode of burning, mild (3/10), non-radiating chest pain with associated dizziness, LUE numbness and vision changes.  Symptoms resolved before presentation.    Interventional to take patient to cath lab Monday morning  - During color flow doppler study patient was found to have new wall motion abnormalities, interventional cardiology consulted  - Suspect GERD as etiology since patient had recently eaten pizza.  Possible ACS in setting of multiple risk factors.   - Per patient, arm numbness similar to previous presentation with NSTEMI (albeit unilateral today), but no mention of arm  numbness per chart review.   - Initial troponin and BNP negative. Will trend.   - CXR WNL.     - EKG x2 shows inverted inferior T waves. Patient asymptomatic with poor story.  Last EKG was from 2014 and could have flipped T waves since then for reasons other than ischemia. Discussed EKG with Dr. Santoyo.   - HEART score 4  - Will repeat ECHO as last was 9/3/2014  - Hg A1c 5.1  - consider stress echo or cardiology consult if troponins increase or CP returns.  - given  in ED.  Continue ASA daily.  - will need close cardiology f/u as he is poor historian and continues to smoke despite cardiac hx.         Coronary artery disease involving native coronary artery of native heart without angina pectoris    MANJINDER x2 placed in LAD and PTA to diagonal (2013)  - continue ASA. Plavix d/c after 1 year s/p PCI  - continue statin, ACEI  - does not follow with cardiology anymore, but would benefit from close cardiology f/u (previously with [facility])        Chronic systolic heart failure    Stable  - patient was not aware of this diagnosis.  Pt is also poor historian.  - Stress ECHO 9/3/2014 shows mildly depressed systolic function (45%), normal diastolic function.  Also showed non-specific failure to augment in segments with hypokinesis at rest, which cardiology did not believe was d/t ischemia.    - mild bibasilar rales, which may be atelectasis, otherwise euvolemic.  - BNP negative in setting of obesity, CXR with no evidence of edema  - not currently on any diuretics.  EF logan over the course of a year after diagnosed with NSTEMI (EF 25 at the time).  - Will recheck ECHO   - continue ACEI  - strict I/O, daily weights, tele        Paroxysmal A-fib    Afib with RVR in 2013 when dx with NSTEMI. Converted to NSR without intervention same day of admission.  - EKG shows NSR  - not currenly on BB, AC  - patient unsure if he was on AC in the past.  - CHADS VASC score 3; HASBLED 1        Cigarette smoker    25 pack year hx; currently  1ppd  - has attempted to quit in the past but not currently motivated to do so.    - significant CAD risk factor  - nicotine patch        Hyperlipidemia    Continue statin        HTN (hypertension)    Stable  - continue lisinopril 10 mg PO daily          VTE Risk Mitigation         Ordered     enoxaparin injection 40 mg  Daily      06/21/18 2242     IP VTE HIGH RISK PATIENT  Once      06/21/18 2242     Place MARIANN hose  Until discontinued      06/21/18 2242     Place sequential compression device  Until discontinued      06/21/18 2242              Trista Boston PA-C  Department of Hospital Medicine   [facility]

## 2018-06-24 NOTE — PROGRESS NOTES
[facility] Hospital Medicine  Progress Note    Patient Name: Cody Castro  MRN: [id number]  Patient Class: IP- Inpatient   Admission Date: 6/21/2018  Length of Stay: 0 days  Attending Physician: Yomaira Vargas MD  Primary Care Provider: Abad Patterson MD    [facility] Medicine Team: [facility] E Trista Boston PA-C    Subjective:     Principal Problem:Unstable angina    HPI:  This is a  37 y.o. male with pmhx CAD (s/p LAD and D1 PCI in 2013), +FH CAD w/ father having MI @ age 45, current smoker (25 pack year), HTN, HLD, pAfib (not on AC) who presents to the ED c/o acute episode of burning, mild (3/10), non-radiating chest pain with associated dizziness, LUE numbness and vision changes. Patient states that symptoms began at 530 pm when he stood up from sitting.  Symptoms resolved without intervention by the time he got into his car to come to the ED.  Patient reports CP similar to heartburn but had experienced BUE numbness prior to previous NSTEMI. Pt denies any SOB, CP, dizziness, jaw pain, numbness, tingling, vision changes, headache, f/c.  Pt denies CP and SOB in the ED.  States his LUE tingling felt similar to previous NSTEMI episode in 2013. Patient currently f/w PCP Dr. Patterson on [address] but does not currently f/w cardiology (preivously seen by [facility]).  Patient is poor historian and has poor understanding of medical dx.     Per chart review, admitted in 2013 with CP, SOB, diaphoresis and 2 episodes of syncope while walking in [facility] and dx with NSTEMI.  No changes on EKG but troponin trended up to 50.  ECHO on admit showed moderately depressed EF.  LHC revealed occluded LAD (collateral RCA flow) and 75% stenosis to prox D1.  MANJINDER x2 placed in LAD and PTA to diagnonal.  New onset A fib when initially presented, but spontaneously converted to NSR on day of admission.     In ED, intake labs unremarkable.  Initial troponin and BNP negative. CXR WNL.   Initial EKG shows SR with PVCs and  inverted T waves in inferior leads.  Repeat EKG shows NSR with sinus arrhythmia and inverted inferior T waves.     Hospital Course:  Cody Castro was admitted to observation for chest pain. Color flow doppler study revealed possible new wall motion abnormalities therefore exercise stress echo could not be completed. Interventional cardiology consulted and following, patient to cath lab Monday morning.     Interval History: Chest pain resolved. No events overnight.    Review of Systems   Constitutional: Negative for chills and fever.   HENT: Negative for ear pain and sore throat.    Eyes: Negative for pain and visual disturbance.   Respiratory: Negative for cough and shortness of breath.    Cardiovascular: Negative for chest pain (resolved), palpitations and leg swelling.   Gastrointestinal: Negative for abdominal pain, diarrhea, nausea and vomiting.   Endocrine: Negative for heat intolerance and polydipsia.   Genitourinary: Negative for difficulty urinating, dysuria, frequency and urgency.   Musculoskeletal: Positive for back pain (chronic). Negative for gait problem.   Skin: Negative for rash and wound.   Neurological: Negative for dizziness (resolved), weakness and numbness (LUE; resolved).   Psychiatric/Behavioral: Negative for confusion. The patient is not nervous/anxious.      Objective:     Vital Signs (Most Recent):  Temp: 98.8 °F (37.1 °C) (06/23/18 1639)  Pulse: 100 (06/23/18 1800)  Resp: 18 (06/23/18 1639)  BP: 115/67 (06/23/18 1639)  SpO2: (!) 92 % (06/23/18 1639) Vital Signs (24h Range):  Temp:  [97.7 °F (36.5 °C)-98.8 °F (37.1 °C)] 98.8 °F (37.1 °C)  Pulse:  [] 100  Resp:  [17-18] 18  SpO2:  [92 %-97 %] 92 %  BP: (104-128)/(56-79) 115/67     Weight: 109 kg (240 lb 4.8 oz)  Body mass index is 36.54 kg/m².    Intake/Output Summary (Last 24 hours) at 06/23/18 1959  Last data filed at 06/23/18 1700   Gross per 24 hour   Intake              480 ml   Output              400 ml   Net               80 ml       Physical Exam   Constitutional: He is oriented to person, place, and time. He appears well-developed and well-nourished.   HENT:   Head: Normocephalic and atraumatic.   Eyes: Conjunctivae and EOM are normal. Pupils are equal, round, and reactive to light.   Neck: Normal range of motion. Neck supple.   Cardiovascular: Normal rate, regular rhythm, normal heart sounds and intact distal pulses.    Pulmonary/Chest: Effort normal. No respiratory distress. He has decreased breath sounds. He has no wheezes. He exhibits no tenderness.   Abdominal: Soft. Bowel sounds are normal. He exhibits no distension. There is no tenderness.   Musculoskeletal: Normal range of motion.   Neurological: He is alert and oriented to person, place, and time.   BUE STR 5/5 without numbness/tingling   Skin: Skin is warm and dry.   Psychiatric: He has a normal mood and affect. His behavior is normal. Judgment and thought content normal.   Nursing note and vitals reviewed.      Significant Labs: All pertinent labs within the past 24 hours have been reviewed.    Significant Imaging: I have reviewed all pertinent imaging results/findings within the past 24 hours.    Assessment/Plan:      * Unstable angina    37 y.o. male with pmhx CAD (s/p LAD and D1 PCI in 2013), +FH CAD w/ father having MI @ age 45, current smoker (25 pack year), HTN, pAfib, HLD who presents to the ED c/o acute episode of burning, mild (3/10), non-radiating chest pain with associated dizziness, LUE numbness and vision changes.  Symptoms resolved before presentation.    Interventional to take patient to cath lab Monday morning  - During color flow doppler study patient was found to have new wall motion abnormalities, interventional cardiology consulted  - Suspect GERD as etiology since patient had recently eaten pizza.  Possible ACS in setting of multiple risk factors.   - Per patient, arm numbness similar to previous presentation with NSTEMI (albeit unilateral today), but no  mention of arm numbness per chart review.   - Initial troponin and BNP negative. Will trend.   - CXR WNL.     - EKG x2 shows inverted inferior T waves. Patient asymptomatic with poor story.  Last EKG was from 2014 and could have flipped T waves since then for reasons other than ischemia. Discussed EKG with Dr. Santoyo.   - HEART score 4  - Will repeat ECHO as last was 9/3/2014  - Hg A1c 5.1  - consider stress echo or cardiology consult if troponins increase or CP returns.  - given  in ED.  Continue ASA daily.  - will need close cardiology f/u as he is poor historian and continues to smoke despite cardiac hx.         Coronary artery disease involving native coronary artery of native heart without angina pectoris    MANJINDER x2 placed in LAD and PTA to diagonal (2013)  - continue ASA. Plavix d/c after 1 year s/p PCI  - continue statin, ACEI  - does not follow with cardiology anymore, but would benefit from close cardiology f/u (previously with [facility])        Chronic systolic heart failure    Stable  - patient was not aware of this diagnosis.  Pt is also poor historian.  - Stress ECHO 9/3/2014 shows mildly depressed systolic function (45%), normal diastolic function.  Also showed non-specific failure to augment in segments with hypokinesis at rest, which cardiology did not believe was d/t ischemia.    - mild bibasilar rales, which may be atelectasis, otherwise euvolemic.  - BNP negative in setting of obesity, CXR with no evidence of edema  - not currently on any diuretics.  EF logan over the course of a year after diagnosed with NSTEMI (EF 25 at the time).  - Will recheck ECHO   - continue ACEI  - strict I/O, daily weights, tele        Paroxysmal A-fib    Afib with RVR in 2013 when dx with NSTEMI. Converted to NSR without intervention same day of admission.  - EKG shows NSR  - not currenly on BB, AC  - patient unsure if he was on AC in the past.  - CHADS VASC score 3; HASBLED 1        Cigarette smoker    25 pack year hx;  currently 1ppd  - has attempted to quit in the past but not currently motivated to do so.    - significant CAD risk factor  - nicotine patch        Hyperlipidemia    Continue statin        HTN (hypertension)    Stable  - continue lisinopril 10 mg PO daily          VTE Risk Mitigation         Ordered     enoxaparin injection 40 mg  Daily      06/21/18 2242     IP VTE HIGH RISK PATIENT  Once      06/21/18 2242     Place MARIANN hose  Until discontinued      06/21/18 2242     Place sequential compression device  Until discontinued      06/21/18 2242              Trista Boston PA-C  Department of Hospital Medicine   [facility]

## 2018-06-24 NOTE — SUBJECTIVE & OBJECTIVE
Interval History: No reported events overnight.    Review of Systems   Constitutional: Negative for chills and fever.   HENT: Negative for ear pain and sore throat.    Eyes: Negative for pain and visual disturbance.   Respiratory: Negative for cough and shortness of breath.    Cardiovascular: Negative for chest pain (resolved), palpitations and leg swelling.   Gastrointestinal: Negative for abdominal pain, diarrhea, nausea and vomiting.   Endocrine: Negative for heat intolerance and polydipsia.   Genitourinary: Negative for difficulty urinating, dysuria, frequency and urgency.   Musculoskeletal: Positive for back pain (chronic). Negative for gait problem.   Skin: Negative for rash and wound.   Neurological: Negative for dizziness (resolved), weakness and numbness (LUE; resolved).   Psychiatric/Behavioral: Negative for confusion. The patient is not nervous/anxious.      Objective:     Vital Signs (Most Recent):  Temp: 96.8 °F (36 °C) (06/24/18 0756)  Pulse: 75 (06/24/18 0756)  Resp: 17 (06/24/18 0756)  BP: 112/65 (06/24/18 0756)  SpO2: 96 % (06/24/18 0756) Vital Signs (24h Range):  Temp:  [96.1 °F (35.6 °C)-98.8 °F (37.1 °C)] 96.8 °F (36 °C)  Pulse:  [] 75  Resp:  [] 17  SpO2:  [92 %-97 %] 96 %  BP: (108-135)/(58-77) 112/65     Weight: 107.1 kg (236 lb 1.8 oz)  Body mass index is 35.9 kg/m².    Intake/Output Summary (Last 24 hours) at 06/24/18 1119  Last data filed at 06/24/18 1000   Gross per 24 hour   Intake              480 ml   Output              725 ml   Net             -245 ml      Physical Exam   Constitutional: He is oriented to person, place, and time. He appears well-developed and well-nourished.   HENT:   Head: Normocephalic and atraumatic.   Eyes: Conjunctivae and EOM are normal. Pupils are equal, round, and reactive to light.   Neck: Normal range of motion. Neck supple.   Cardiovascular: Normal rate, regular rhythm, normal heart sounds and intact distal pulses.    Pulmonary/Chest: Effort  normal. No respiratory distress. He has decreased breath sounds. He has no wheezes. He exhibits no tenderness.   Abdominal: Soft. Bowel sounds are normal. He exhibits no distension. There is no tenderness.   Musculoskeletal: Normal range of motion.   Neurological: He is alert and oriented to person, place, and time.   BUE STR 5/5 without numbness/tingling   Skin: Skin is warm and dry.   Psychiatric: He has a normal mood and affect. His behavior is normal. Judgment and thought content normal.   Nursing note and vitals reviewed.      Significant Labs: All pertinent labs within the past 24 hours have been reviewed.    Significant Imaging: I have reviewed all pertinent imaging results/findings within the past 24 hours.

## 2018-06-24 NOTE — PLAN OF CARE
Problem: Patient Care Overview  Goal: Plan of Care Review  Outcome: Ongoing (interventions implemented as appropriate)  Plan of care reviewed with patient, aaox3, denies chest pain, sob, vital signs stable, fall and safety precautions maintained, will cont to monitor

## 2018-06-25 VITALS
HEART RATE: 70 BPM | OXYGEN SATURATION: 99 % | DIASTOLIC BLOOD PRESSURE: 61 MMHG | BODY MASS INDEX: 35.79 KG/M2 | SYSTOLIC BLOOD PRESSURE: 108 MMHG | TEMPERATURE: 99 F | HEIGHT: 68 IN | WEIGHT: 236.13 LBS | RESPIRATION RATE: 16 BRPM

## 2018-06-25 LAB
ABO + RH BLD: NORMAL
ANION GAP SERPL CALC-SCNC: 9 MMOL/L
BASOPHILS # BLD AUTO: 0.06 K/UL
BASOPHILS NFR BLD: 0.7 %
BLD GP AB SCN CELLS X3 SERPL QL: NORMAL
BUN SERPL-MCNC: 15 MG/DL
CALCIUM SERPL-MCNC: 9.8 MG/DL
CHLORIDE SERPL-SCNC: 104 MMOL/L
CO2 SERPL-SCNC: 28 MMOL/L
CREAT SERPL-MCNC: 1.1 MG/DL
DIFFERENTIAL METHOD: NORMAL
EOSINOPHIL # BLD AUTO: 0.2 K/UL
EOSINOPHIL NFR BLD: 1.7 %
ERYTHROCYTE [DISTWIDTH] IN BLOOD BY AUTOMATED COUNT: 12.4 %
EST. GFR  (AFRICAN AMERICAN): >60 ML/MIN/1.73 M^2
EST. GFR  (NON AFRICAN AMERICAN): >60 ML/MIN/1.73 M^2
GLUCOSE SERPL-MCNC: 93 MG/DL
HCT VFR BLD AUTO: 45.2 %
HGB BLD-MCNC: 14.8 G/DL
IMM GRANULOCYTES # BLD AUTO: 0.04 K/UL
IMM GRANULOCYTES NFR BLD AUTO: 0.5 %
INTERNAL CAROTID STENOSIS: NORMAL
LYMPHOCYTES # BLD AUTO: 2.4 K/UL
LYMPHOCYTES NFR BLD: 27.5 %
MAGNESIUM SERPL-MCNC: 2.4 MG/DL
MCH RBC QN AUTO: 30.8 PG
MCHC RBC AUTO-ENTMCNC: 32.7 G/DL
MCV RBC AUTO: 94 FL
MONOCYTES # BLD AUTO: 0.6 K/UL
MONOCYTES NFR BLD: 6.5 %
NEUTROPHILS # BLD AUTO: 5.6 K/UL
NEUTROPHILS NFR BLD: 63.1 %
NRBC BLD-RTO: 0 /100 WBC
PHOSPHATE SERPL-MCNC: 3.7 MG/DL
PLATELET # BLD AUTO: 280 K/UL
PMV BLD AUTO: 9.9 FL
POTASSIUM SERPL-SCNC: 4.2 MMOL/L
RBC # BLD AUTO: 4.81 M/UL
SODIUM SERPL-SCNC: 141 MMOL/L
WBC # BLD AUTO: 8.83 K/UL

## 2018-06-25 PROCEDURE — 85025 COMPLETE CBC W/AUTO DIFF WBC: CPT

## 2018-06-25 PROCEDURE — 80048 BASIC METABOLIC PNL TOTAL CA: CPT

## 2018-06-25 PROCEDURE — 84100 ASSAY OF PHOSPHORUS: CPT

## 2018-06-25 PROCEDURE — 0271356 DILATION OF CORONARY ARTERY, TWO ARTERIES, BIFURCATION, WITH TWO DRUG-ELUTING INTRALUMINAL DEVICES, PERCUTANEOUS APPROACH: ICD-10-PCS | Performed by: INTERNAL MEDICINE

## 2018-06-25 PROCEDURE — 99152 MOD SED SAME PHYS/QHP 5/>YRS: CPT

## 2018-06-25 PROCEDURE — 25000003 PHARM REV CODE 250: Performed by: PHYSICIAN ASSISTANT

## 2018-06-25 PROCEDURE — 86850 RBC ANTIBODY SCREEN: CPT

## 2018-06-25 PROCEDURE — 36415 COLL VENOUS BLD VENIPUNCTURE: CPT

## 2018-06-25 PROCEDURE — 93005 ELECTROCARDIOGRAM TRACING: CPT

## 2018-06-25 PROCEDURE — 99239 HOSP IP/OBS DSCHRG MGMT >30: CPT | Mod: ,,, | Performed by: PHYSICIAN ASSISTANT

## 2018-06-25 PROCEDURE — 93010 ELECTROCARDIOGRAM REPORT: CPT | Mod: ,,, | Performed by: INTERNAL MEDICINE

## 2018-06-25 PROCEDURE — 93571 IV DOP VEL&/PRESS C FLO 1ST: CPT | Mod: 26,RC,, | Performed by: INTERNAL MEDICINE

## 2018-06-25 PROCEDURE — 99152 MOD SED SAME PHYS/QHP 5/>YRS: CPT | Mod: ,,, | Performed by: INTERNAL MEDICINE

## 2018-06-25 PROCEDURE — 92928 PRQ TCAT PLMT NTRAC ST 1 LES: CPT | Mod: LC,,, | Performed by: INTERNAL MEDICINE

## 2018-06-25 PROCEDURE — B2151ZZ FLUOROSCOPY OF LEFT HEART USING LOW OSMOLAR CONTRAST: ICD-10-PCS | Performed by: INTERNAL MEDICINE

## 2018-06-25 PROCEDURE — 25000003 PHARM REV CODE 250: Performed by: INTERNAL MEDICINE

## 2018-06-25 PROCEDURE — C1887 CATHETER, GUIDING: HCPCS

## 2018-06-25 PROCEDURE — 4A023N7 MEASUREMENT OF CARDIAC SAMPLING AND PRESSURE, LEFT HEART, PERCUTANEOUS APPROACH: ICD-10-PCS | Performed by: INTERNAL MEDICINE

## 2018-06-25 PROCEDURE — 93458 L HRT ARTERY/VENTRICLE ANGIO: CPT | Mod: 26,59,, | Performed by: INTERNAL MEDICINE

## 2018-06-25 PROCEDURE — 27000014 CATH LAB PROCEDURE

## 2018-06-25 PROCEDURE — 93572 IV DOP VEL&/PRESS C FLO EA: CPT | Mod: 26,LC,, | Performed by: INTERNAL MEDICINE

## 2018-06-25 PROCEDURE — 4A033BC MEASUREMENT OF ARTERIAL PRESSURE, CORONARY, PERCUTANEOUS APPROACH: ICD-10-PCS | Performed by: INTERNAL MEDICINE

## 2018-06-25 PROCEDURE — B2111ZZ FLUOROSCOPY OF MULTIPLE CORONARY ARTERIES USING LOW OSMOLAR CONTRAST: ICD-10-PCS | Performed by: INTERNAL MEDICINE

## 2018-06-25 PROCEDURE — 25000003 PHARM REV CODE 250

## 2018-06-25 PROCEDURE — 83735 ASSAY OF MAGNESIUM: CPT

## 2018-06-25 PROCEDURE — S4991 NICOTINE PATCH NONLEGEND: HCPCS | Performed by: PHYSICIAN ASSISTANT

## 2018-06-25 PROCEDURE — 63600175 PHARM REV CODE 636 W HCPCS

## 2018-06-25 RX ORDER — ASPIRIN 81 MG/1
81 TABLET ORAL DAILY
Refills: 0 | Status: ON HOLD | COMMUNITY
Start: 2018-06-26 | End: 2024-03-19

## 2018-06-25 RX ORDER — ASPIRIN 325 MG
325 TABLET ORAL ONCE
Status: COMPLETED | OUTPATIENT
Start: 2018-06-25 | End: 2018-06-25

## 2018-06-25 RX ORDER — SODIUM CHLORIDE 9 MG/ML
3 INJECTION, SOLUTION INTRAVENOUS CONTINUOUS
Status: ACTIVE | OUTPATIENT
Start: 2018-06-25 | End: 2018-06-25

## 2018-06-25 RX ORDER — DIPHENHYDRAMINE HCL 50 MG
50 CAPSULE ORAL ONCE
Status: COMPLETED | OUTPATIENT
Start: 2018-06-25 | End: 2018-06-25

## 2018-06-25 RX ORDER — CLOPIDOGREL BISULFATE 75 MG/1
75 TABLET ORAL DAILY
Qty: 30 TABLET | Refills: 11 | Status: SHIPPED | OUTPATIENT
Start: 2018-06-26 | End: 2019-11-15

## 2018-06-25 RX ORDER — CLOPIDOGREL 300 MG/1
300 TABLET, FILM COATED ORAL ONCE
Status: COMPLETED | OUTPATIENT
Start: 2018-06-25 | End: 2018-06-25

## 2018-06-25 RX ADMIN — CLONAZEPAM 1 MG: 1 TABLET ORAL at 08:06

## 2018-06-25 RX ADMIN — NICOTINE 1 PATCH: 7 PATCH, EXTENDED RELEASE TRANSDERMAL at 11:06

## 2018-06-25 RX ADMIN — DIPHENHYDRAMINE HYDROCHLORIDE 50 MG: 50 CAPSULE ORAL at 02:06

## 2018-06-25 RX ADMIN — SODIUM CHLORIDE 1.5 ML/KG/HR: 0.9 INJECTION, SOLUTION INTRAVENOUS at 04:06

## 2018-06-25 RX ADMIN — ASPIRIN 81 MG: 81 TABLET, COATED ORAL at 09:06

## 2018-06-25 RX ADMIN — CLONAZEPAM 1 MG: 1 TABLET ORAL at 09:06

## 2018-06-25 RX ADMIN — CLOPIDOGREL BISULFATE 300 MG: 300 TABLET, FILM COATED ORAL at 11:06

## 2018-06-25 RX ADMIN — LISINOPRIL 10 MG: 10 TABLET ORAL at 09:06

## 2018-06-25 RX ADMIN — ASPIRIN 325 MG ORAL TABLET 325 MG: 325 PILL ORAL at 11:06

## 2018-06-25 RX ADMIN — HYDROCODONE BITARTRATE AND ACETAMINOPHEN 1 TABLET: 5; 325 TABLET ORAL at 06:06

## 2018-06-25 RX ADMIN — SIMVASTATIN 40 MG: 20 TABLET, FILM COATED ORAL at 08:06

## 2018-06-25 RX ADMIN — HYDROCODONE BITARTRATE AND ACETAMINOPHEN 1 TABLET: 5; 325 TABLET ORAL at 05:06

## 2018-06-25 NOTE — PLAN OF CARE
Problem: Patient Care Overview  Goal: Plan of Care Review  Outcome: Ongoing (interventions implemented as appropriate)  Plan of care reviewed with patient for today's scheduled left heart cath.    Patient verbalized understanding of NPO status that began at midnight, consents verified and in patient's chart, pre procedure orders released. Patient remains NSR on tele monitor.    No questions or concerns, vital signs stable, patient denies any sob/chest pain.

## 2018-06-25 NOTE — PROCEDURES
"            Interventional Cardiology   Post Cath Note      Referring Physician: Yomaira Vargas MD  Procedure: PCI  Indication: [place]    SUBJECTIVE:     Patient tolerated the procedure well, no complications    Cath Results:   Access:  R Radial 6F    See full cath report for further details    Intervention:     MANJINDER to dRCA, prox Cx and OM1    Closure device used: No      Post Cath Exam:   /68 (BP Location: Right arm, Patient Position: Sitting)   Pulse 81   Temp 97.6 °F (36.4 °C) (Oral)   Resp 16   Ht 5' 8" (1.727 m)   Wt 107.1 kg (236 lb 1.8 oz)   SpO2 95%   BMI 35.90 kg/m²     No unusual pain, hematoma, thrill or bruit at vascular access site.  Distal pulse present without signs of ischemia.    Assessment / Plan:     - Routine post cath protocol  - IVFs for DORI prevention  - EC ASA 81mg po qday  - Plavix 75mg poq day X 1 year  - Cardiac rehabilitation program enrollment  - Further care per primary team    Please page/call if any questions or concerns.    Trena Pulido MD  Interventional Cardiology Fellow  Pager: [phone]      "

## 2018-06-25 NOTE — H&P (VIEW-ONLY)
[facility]  Interventional Cardiology  Consult Note    Patient Name: Cody Castro  MRN: [id number]  Admission Date: 6/21/2018  Hospital Length of Stay: 0 days  Code Status: Full Code   Attending Provider: Yomaira Vargas MD  Consulting Provider: Trena Pulido MD  Primary Care Physician: Abad Patterson MD  Principal Problem:Unstable angina    Patient information was obtained from patient, past medical records and ER records.     Consults  Subjective:     Chief Complaint:  [place]     HPI:  This is a 36 y/o M w/ a PMHx sig for CAD s/p MANJINDER 3x12 mm and 3x18 mm to prox LAD and POBA (2.5x12 mm) to D1 in 02/2013,  current smoker (25 pack year), HTN, HLD, PAF (not on AC) who presents to the ED w/ atypical CP.    He had an episode of burning, mild, non-radiating chest pain with associated dizziness, LUE numbness and vision changes that started around 5:30 pm on Thursday 06/21. It was not a/w exertion. Sxs resolved without intervention. He had similar sxs prior top his NSTEMI in 02/2013 which led to his PCI. In the hospital he has remained CP free, trops -ve x3. EKG showed transient TWIs in inferior leads. Echo here shows new WMAs of mid inferoseptum (mild hypokinesis),  apical septum (moderately hypokinetic) and basal inferoseptum (severely hypokinetic).      Past Medical History:   Diagnosis Date    Anxiety     Carbon monoxide poisoning     Caused a seizure    Coronary artery disease     History of heart artery stent 2013    Hypertension     Obesity        Past Surgical History:   Procedure Laterality Date    CORONARY ANGIOPLASTY         Review of patient's allergies indicates:  No Known Allergies    PTA Medications   Medication Sig    aspirin (ECOTRIN) 81 MG EC tablet Take 81 mg by mouth once daily.    HYDROCODONE/ACETAMINOPHEN (NORCO ORAL) Take 7.5 mg by mouth every 6 (six) hours as needed.     lisinopril (PRINIVIL,ZESTRIL) 5 MG tablet Take 10 mg by mouth once daily.    simvastatin (ZOCOR) 40 MG  tablet Take 40 mg by mouth every evening.    clonazepam (KLONOPIN) 1 MG tablet Take 1 mg by mouth 2 (two) times daily as needed.     Family History     Problem Relation (Age of Onset)    Heart disease Mother, Father        Social History Main Topics    Smoking status: Current Every Day Smoker     Packs/day: 1.00     Years: 20.00     Types: Cigarettes     Last attempt to quit: 2/10/2009    Smokeless tobacco: Never Used    Alcohol use Yes      Comment: occasional. none in 2 months    Drug use: Yes     Types: Marijuana      Comment: occasional marijuana use.last 2 weeks ago    Sexual activity: Yes     Partners: Female     ROS   Constitution: negative for - fever, chills, weight loss or weight gain  HENT: negative for - sore throat, rhinorrhea, or headache  Eyes: negative for - blurred or double vision  Cardiovascular: see above  Pulmonary: see above  Gastrointestinal: negative for - abdominal pain, nausea, vomiting, or diarrhea  : negative for - dysuria  Neurological: negative for - focal weakness or sensory changes    Objective:     Vital Signs (Most Recent):  Temp: 98.2 °F (36.8 °C) (06/23/18 1201)  Pulse: 83 (06/23/18 1201)  Resp: 18 (06/23/18 1201)  BP: 123/77 (06/23/18 1201)  SpO2: 97 % (06/23/18 1201) Vital Signs (24h Range):  Temp:  [97.7 °F (36.5 °C)-98.2 °F (36.8 °C)] 98.2 °F (36.8 °C)  Pulse:  [] 83  Resp:  [16-18] 18  SpO2:  [93 %-98 %] 97 %  BP: (104-128)/(56-79) 123/77     Weight: 109 kg (240 lb 4.8 oz)  Body mass index is 36.54 kg/m².    SpO2: 97 %  O2 Device (Oxygen Therapy): room air      Intake/Output Summary (Last 24 hours) at 06/23/18 1304  Last data filed at 06/23/18 1029   Gross per 24 hour   Intake                0 ml   Output              400 ml   Net             -400 ml       Lines/Drains/Airways     Peripheral Intravenous Line                 Peripheral IV - Single Lumen 06/21/18 2053 Left Forearm 1 day                Physical Exam   /77 (BP Location: Right arm, Patient  "Position: Lying)   Pulse 83   Temp 98.2 °F (36.8 °C) (Oral)   Resp 18   Ht 5' 8" (1.727 m)   Wt 109 kg (240 lb 4.8 oz)   SpO2 97%   BMI 36.54 kg/m²        Constitutional: No apparent distress, conversant  HEENT: Sclera anicteric, extraocular movements intact  Neck: No jugular venous distension, no carotid bruits  Cardiac: Regular rate and rhythm, no JVD,  no murmurs rubs or gallops, normal S1/S2, no LE edema  Pulm: Clear to auscultation bilaterally, no wheezes, rales, or ronchi  GI: Abdomen soft, nontender, nondistended  Skin: No ecchymosis, erythema, or ulcers  Psych: Alert and oriented to person place location, appropriate affect  Neuro: No focal deficits  Vascular:   RIGHT LEFT   RADIAL 2+ 2+   ULNAR 2+ 2+   BRACHIAL 2+ 2+   FEMORAL 2+ 2+   DP 2+ 2+   TP 2+ 2+   AMINAH'S TEST Normal Normal   BARBEAU TEST           Significant Labs: All pertinent lab results from the last 24 hours have been reviewed.    Significant Imaging: Echocardiogram:   2D echo with color flow doppler:   Results for orders placed or performed during the hospital encounter of 06/21/18   2D echo with color flow doppler   Result Value Ref Range    EF 50 55 - 65    Diastolic Dysfunction No      Assessment and Plan:     * Unstable angina    H/o PCI s/p MANJINDER 3x12 mm and 3x18 mm to prox LAD and POBA (2.5x12 mm) to D1 in 02/2013    Current smoker (25 pack year)  Trops -ve x3  EKG showed transient TWIs in inferior leads   Echo here shows new WMAs of mid inferoseptum (mild hypokinesis),  apical septum (moderately hypokinetic) and basal inferoseptum (severely hypokinetic)    Will proceed w/ LHC on Monday  - Cardiac Catheterization with probable PCI  - Anti-platelet Therapy:  mg load x1, Plavix 300 mg load x1 and then 75 mg QD   - Access: Pt prefers R Radial approach  - Catheters: Keshav  - Serum Creatinine/CrCl: 1.2/>60.0  - Allergies: No shellfish/Iodine allergy  - Pre-Hydration: 3 cc/kg/hr IV, continuous, for 1 hour, Pre-Procedure  - Pre-Op " Med: Diphenhydramine (Benadryl) 50 mg, Oral, Once, Pre-Procedure   - Pt is a MANJINDER candidate and understands the importance of taking Ticagrelor 90 mg BID or Plavix 75 mg daily or Prasugrel 10 mg daily for at least one year, understands that in case of receiving a drug coated stent the failure to comply with dual anti-platelet therapy is likely to result in stent clotting, heart attack and death.   - The patient understands the risks and benefits and wishes to go ahead with the procedure.  - All patient's questions were answered.  - The risks, benefits & alternatives of the procedure were explained to the patient.   - The risks of coronary angiography include but are not limited to: Bleeding, infection, heart rhythm abnormalities, allergic reactions, kidney injury requiring dilaysis, limb loss, stroke and death.   - Should stenting be indicated, the patient has agreed to dual anti-platelet therapy for 1-consecutive year with a drug-eluting stent and a minimum of 1-month with the use of a bare metal stent.   - Additionally, pt is aware that non-compliance is likely to result in stent clotting with heart attack, heart failure, and/or death.  - The risks of moderate sedation include hypotension, respiratory depression, arrhythmias, bronchospasm, & death.   - Informed consent was obtained & the patient is agreeable to proceed with the procedure.  - This patient was discussed with the attending interventional cardiologist who agrees with the above assessment & plan.              VTE Risk Mitigation         Ordered     enoxaparin injection 40 mg  Daily      06/21/18 2242     IP VTE HIGH RISK PATIENT  Once      06/21/18 2242     Place MARIANN hose  Until discontinued      06/21/18 2242     Place sequential compression device  Until discontinued      06/21/18 2242          Thank you for your consult.     Trena Pulido MD  Interventional Cardiology   [facility]

## 2018-06-25 NOTE — PROGRESS NOTES
Pt arrived back to the floor from Cath Lab. Pt aao x4 ambulated from stretcher to bed. R radial wrist with radial band in place. No bleeding or  hematoma noted. Palpable pulses. VSS. Will continue to monitor.

## 2018-06-25 NOTE — PLAN OF CARE
06/25/18 1514   Discharge Reassessment   Assessment Type Discharge Planning Reassessment   Do you have any problems affording any of your prescribed medications? No   Discharge Plan A Home with family   Discharge Plan B Home Health   Can the patient answer the patient profile reliably? Yes, cognitively intact   How does the patient rate their overall health at the present time? Good   Describe the patient's ability to walk at the present time. No restrictions   How often would a person be available to care for the patient? Whenever needed   Number of comorbid conditions (as recorded on the chart) Five or more   During the past month, has the patient often been bothered by feeling down, depressed or hopeless? No   During the past month, has the patient often been bothered by little interest or pleasure in doing things? No     Patient scheduled to have a left heart cath done today. Will continue to follow.

## 2018-06-25 NOTE — INTERVAL H&P NOTE
The patient has been examined and the H&P has been reviewed:    I concur with the findings and no changes have occurred since H&P was written.    Anesthesia/Surgery risks, benefits and alternative options discussed and understood by patient/family.          Active Hospital Problems    Diagnosis  POA    *Unstable angina [I20.0]  Yes    Chronic systolic heart failure [I50.22]  Unknown    Coronary artery disease involving native coronary artery of native heart without angina pectoris [I25.10]  Yes    Hyperlipidemia [E78.5]  Yes     Chronic    HTN (hypertension) [I10]  Yes     Chronic    Cigarette smoker [F17.210]  Yes     Chronic    Paroxysmal A-fib [I48.0]  Yes      Resolved Hospital Problems    Diagnosis Date Resolved POA    Atypical chest pain [R07.89] 06/23/2018 Yes

## 2018-06-25 NOTE — PLAN OF CARE
Problem: Patient Care Overview  Goal: Plan of Care Review  Outcome: Ongoing (interventions implemented as appropriate)  Pt aao x 4, up ad siena. POC reviewed with pt. Telemetry monitor in place. Denies CP or discomfort. Nicotine patch applied to R arm. Safety precautions maintained. Bed in low position wheels locked. Call light within reach. Pt free of falls.

## 2018-06-26 LAB
POC ACTIVATED CLOTTING TIME K: 213 SEC (ref 74–137)
POC ACTIVATED CLOTTING TIME K: 252 SEC (ref 74–137)
POC ACTIVATED CLOTTING TIME K: 285 SEC (ref 74–137)
SAMPLE: ABNORMAL

## 2018-06-26 NOTE — NURSING
Patient discharged home with wife, and daughter who provided transportation. Patient walked out of unit, refused wheelchair.     Removed right radial wrist band per protocol, no bleeding noted, no pain, no swelling, patient, denies numbness/tingling to bilateral upper and lower extremities. Covered area with a 2x2 gauze, and placed a clear dressing over area.      Radial pulses +2 bilaterally upon palpation.     Removed IV, no swelling or redness noted, applied gauze and covered in coband.    Administered all night time medications per mar.    Reviewed discharge instructions with patient and answered all questions/concerns. Patient took all his discharge paperwork with him. Vital signs stable.

## 2018-06-26 NOTE — PLAN OF CARE
06/26/18 1008   Final Note   Assessment Type Final Discharge Note     Patient discharged home with no needs on 6/25/18.

## 2018-06-27 LAB
CORONARY STENOSIS: ABNORMAL
CORONARY STENT: YES

## 2018-06-27 NOTE — PLAN OF CARE
06/27/2018      Cody Castro  [address] [address]          [facility] Medicine Dept.  [facility] [address] [address] [phone] after hours  [phone] fax Cody Castro has been hospitalized at the [facility] since 6/21/2018.  Please excuse the patient from duties.  Patient may return on 6/26/18.      Please contact me if you have any questions.                  __________________________  Trista Boston PA-C  06/27/2018

## 2018-07-02 NOTE — DISCHARGE SUMMARY
[facility] Hospital Medicine  Discharge Summary      Patient Name: Cody Castro  MRN: [id number]  Admission Date: 6/21/2018  Hospital Length of Stay: 2 days  Discharge Date and Time: 6/25/2018  9:05 PM  Attending Physician: No att. providers found   Discharging Provider: Trista Boston PA-C  Primary Care Provider: Abad Patterson MD  [facility] Medicine Team: [facility] Trista Boston PA-C    HPI:   This is a  37 y.o. male with pmhx CAD (s/p LAD and D1 PCI in 2013), +FH CAD w/ father having MI @ age 45, current smoker (25 pack year), HTN, HLD, pAfib (not on AC) who presents to the ED c/o acute episode of burning, mild (3/10), non-radiating chest pain with associated dizziness, LUE numbness and vision changes. Patient states that symptoms began at 530 pm when he stood up from sitting.  Symptoms resolved without intervention by the time he got into his car to come to the ED.  Patient reports CP similar to heartburn but had experienced BUE numbness prior to previous NSTEMI. Pt denies any SOB, CP, dizziness, jaw pain, numbness, tingling, vision changes, headache, f/c.  Pt denies CP and SOB in the ED.  States his LUE tingling felt similar to previous NSTEMI episode in 2013. Patient currently f/w PCP Dr. Patterson on [address] but does not currently f/w cardiology (preivously seen by [facility]).  Patient is poor historian and has poor understanding of medical dx.     Per chart review, admitted in 2013 with CP, SOB, diaphoresis and 2 episodes of syncope while walking in [facility] and dx with NSTEMI.  No changes on EKG but troponin trended up to 50.  ECHO on admit showed moderately depressed EF.  LHC revealed occluded LAD (collateral RCA flow) and 75% stenosis to prox D1.  MANJINDER x2 placed in LAD and PTA to diagnonal.  New onset A fib when initially presented, but spontaneously converted to NSR on day of admission.     In ED, intake labs unremarkable.  Initial troponin and BNP negative. CXR WNL.   Initial  EKG shows SR with PVCs and inverted T waves in inferior leads.  Repeat EKG shows NSR with sinus arrhythmia and inverted inferior T waves.     Procedure(s) (LRB):  Left heart cath (Right)      Hospital Course:   Cody Castro was admitted to observation for chest pain. On admit, exercise stress ECHO was ordered as patient with negative troponin's. Color flow doppler study revealed possible new wall motion abnormalities (new WMAs of mid inferoseptum (mild hypokinesis),  apical septum (moderately hypokinetic) and basal inferoseptum (severely hypokinetic)) therefore exercise stress echo could not be completed. Interventional cardiology was consulted for further evaluation who recommended patient to be sent to cath lab 6/25 as he has a previous history of PCI, current smoker with transient changes on EKG. Cath procedure was successfully completed with MANJINDER to dRCA, prox Cx and OM1. Will require dual-platelet therapy for one year following discharge and cardiac rehabilitation.     Consults:   Consults         Status Ordering Provider     Inpatient consult to Interventional Cardiology  Once     Provider:  (Not yet assigned)    REECE Andersen          * Unstable angina    37 y.o. male with pmhx CAD (s/p LAD and D1 PCI in 2013), +FH CAD w/ father having MI @ age 45, current smoker (25 pack year), HTN, pAfib, HLD who presents to the ED c/o acute episode of burning, mild (3/10), non-radiating chest pain with associated dizziness, LUE numbness and vision changes.  Symptoms resolved before presentation.    - Cath lab 6/25 with multiple stents placed: dual anti-platelet therapy at dc (ASA 81mg and Plavix 90mg daily)  - During color flow doppler study patient was found to have new wall motion abnormalities, interventional cardiology consulted  - Suspect GERD as etiology since patient had recently eaten pizza.  Possible ACS in setting of multiple risk factors.   - Per patient, arm numbness similar to previous presentation  with NSTEMI (albeit unilateral today), but no mention of arm numbness per chart review.   - Initial troponin and BNP negative. Will trend.   - CXR WNL.     - EKG x2 shows inverted inferior T waves. Patient asymptomatic with poor story.  Last EKG was from 2014 and could have flipped T waves since then for reasons other than ischemia. Discussed EKG with Dr. Santoyo.   - HEART score 4  - Will repeat ECHO as last was 9/3/2014  - Hg A1c 5.1  - consider stress echo or cardiology consult if troponins increase or CP returns.  - given  in ED.  Continue ASA daily.  - will need close cardiology f/u as he is poor historian and continues to smoke despite cardiac hx.         HTN (hypertension)    Stable  - continue lisinopril 10 mg PO daily          Final Active Diagnoses:    Diagnosis Date Noted POA    PRINCIPAL PROBLEM:  Unstable angina [I20.0] 02/09/2013 Yes    Chronic systolic heart failure [I50.22] 06/22/2018 Unknown    Coronary artery disease involving native coronary artery of native heart without angina pectoris [I25.10] 06/22/2018 Yes    Hyperlipidemia [E78.5] 02/09/2013 Yes     Chronic    HTN (hypertension) [I10] 02/09/2013 Yes     Chronic    Cigarette smoker [F17.210] 02/09/2013 Yes     Chronic    Paroxysmal A-fib [I48.0] 02/09/2013 Yes      Problems Resolved During this Admission:    Diagnosis Date Noted Date Resolved POA    Atypical chest pain [R07.89] 06/21/2018 06/23/2018 Yes       Discharged Condition: stable    Disposition: Home or Self Care    Follow Up:  Follow-up Information     Abad Patterson MD On 6/25/2018.    Specialty:  Internal Medicine  Why:  Go to walk-in clinic 1-2 weeks following discharge for hospital follow up appointment.  Contact information:  [address] [address] [phone]                 Patient Instructions:     Diet Cardiac     Activity as tolerated         Significant Diagnostic Studies: Labs: All labs within the past 24 hours have been reviewed    Pending  Diagnostic Studies:     Procedure Component Value Units Date/Time    EKG 12-LEAD starting tomorrow [[id number]]     Order Status:  Sent Lab Status:  No result          Medications:  Reconciled Home Medications:      Medication List      START taking these medications    clopidogrel 75 mg tablet  Commonly known as:  PLAVIX  Take 1 tablet (75 mg total) by mouth once daily.        CHANGE how you take these medications    * aspirin 81 MG EC tablet  Commonly known as:  ECOTRIN  Take 81 mg by mouth once daily.  What changed:  Another medication with the same name was added. Make sure you understand how and when to take each.     * aspirin 81 MG EC tablet  Commonly known as:  ECOTRIN  Take 1 tablet (81 mg total) by mouth once daily.  What changed:  You were already taking a medication with the same name, and this prescription was added. Make sure you understand how and when to take each.        * This list has 2 medication(s) that are the same as other medications prescribed for you. Read the directions carefully, and ask your doctor or other care provider to review them with you.            CONTINUE taking these medications    clonazePAM 1 MG tablet  Commonly known as:  KLONOPIN  Take 1 mg by mouth 2 (two) times daily as needed.     lisinopril 5 MG tablet  Commonly known as:  PRINIVIL,ZESTRIL  Take 10 mg by mouth once daily.     NORCO ORAL  Take 7.5 mg by mouth every 6 (six) hours as needed.     simvastatin 40 MG tablet  Commonly known as:  ZOCOR  Take 40 mg by mouth every evening.            Indwelling Lines/Drains at time of discharge:   Lines/Drains/Airways          No matching active lines, drains, or airways          Time spent on the discharge of patient: >30 minutes  Patient was seen and examined on the date of discharge and determined to be suitable for discharge.         Trista Boston PA-C  Department of Hospital Medicine  [facility]

## 2018-07-02 NOTE — ASSESSMENT & PLAN NOTE
37 y.o. male with pmhx CAD (s/p LAD and D1 PCI in 2013), +FH CAD w/ father having MI @ age 45, current smoker (25 pack year), HTN, pAfib, HLD who presents to the ED c/o acute episode of burning, mild (3/10), non-radiating chest pain with associated dizziness, LUE numbness and vision changes.  Symptoms resolved before presentation.    - Cath lab 6/25 with multiple stents placed: dual anti-platelet therapy at dc (ASA 81mg and Plavix 90mg daily)  - During color flow doppler study patient was found to have new wall motion abnormalities, interventional cardiology consulted  - Suspect GERD as etiology since patient had recently eaten pizza.  Possible ACS in setting of multiple risk factors.   - Per patient, arm numbness similar to previous presentation with NSTEMI (albeit unilateral today), but no mention of arm numbness per chart review.   - Initial troponin and BNP negative. Will trend.   - CXR WNL.     - EKG x2 shows inverted inferior T waves. Patient asymptomatic with poor story.  Last EKG was from 2014 and could have flipped T waves since then for reasons other than ischemia. Discussed EKG with Dr. Santoyo.   - HEART score 4  - Will repeat ECHO as last was 9/3/2014  - Hg A1c 5.1  - consider stress echo or cardiology consult if troponins increase or CP returns.  - given  in ED.  Continue ASA daily.  - will need close cardiology f/u as he is poor historian and continues to smoke despite cardiac hx.

## 2018-07-18 ENCOUNTER — HOSPITAL ENCOUNTER (EMERGENCY)
Facility: HOSPITAL | Age: 38
Discharge: HOME | End: 2018-07-18
Attending: EMERGENCY MEDICINE

## 2018-07-18 VITALS
RESPIRATION RATE: 17 BRPM | HEART RATE: 80 BPM | DIASTOLIC BLOOD PRESSURE: 83 MMHG | SYSTOLIC BLOOD PRESSURE: 135 MMHG | BODY MASS INDEX: 37.25 KG/M2 | TEMPERATURE: 98 F | OXYGEN SATURATION: 96 % | WEIGHT: 245 LBS

## 2018-07-18 DIAGNOSIS — M25.531 RIGHT WRIST PAIN: ICD-10-CM

## 2018-07-18 DIAGNOSIS — S09.90XA TRAUMATIC INJURY OF HEAD, INITIAL ENCOUNTER: Primary | ICD-10-CM
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
## 2018-07-18 DIAGNOSIS — M25.521 RIGHT ELBOW PAIN: ICD-10-CM

## 2018-07-18 PROCEDURE — 99283 EMERGENCY DEPT VISIT LOW MDM: CPT | Mod: ,,, | Performed by: PHYSICIAN ASSISTANT

## 2018-07-18 PROCEDURE — 99284 EMERGENCY DEPT VISIT MOD MDM: CPT

## 2018-07-18 NOTE — ED NOTES
Pt identifiers Cody Castro were checked and correct  LOC: The patient is awake, alert, aware of environment with an appropriate affect. Oriented x3, speaking appropriately  APPEARANCE: Pt resting comfortably, in no acute distress, pt is clean and well groomed, clothing properly fastened  SKIN: Skin warm, dry and intact, normal skin turgor, moist mucus membranes  RESPIRATORY: Airway is open and patent, respirations are spontaneous, even and unlabored, normal effort and rate  CARDIAC: Normal rate and rhythm, no peripheral edema noted, capillary refill < 3 seconds, bilateral radial pulses 2+  ABDOMEN: Soft, non tender, non distended. Bowel sounds present x  4 quadrants.    NEUROLOGIC: PERRLA, facial expression is symmetrical, patient moving all extremities spontaneously, normal sensation in all extremities when touched with a finger.  Follows all commands appropriately. Pt denies headache.   MUSCULOSKELETAL: No obvious deformities. Pt c/o right hip and right elbow.

## 2018-07-18 NOTE — ED PROVIDER NOTES
Encounter Date: 7/18/2018    SCRIBE #1 NOTE: I, Beatriz Norman, am scribing for, and in the presence of,  Dr. Caldwell. I have scribed the following portions of the note - the APC attestation.       History     Chief Complaint   Patient presents with    Fall     slipped and fell yesterday. c/o right hip and right elbow pain. Ambulatory in triage     37-year-old male with medical history of obesity, CAD, hypertension, hyperlipidemia, AFib presents to the ED with chief complaint of fall.  Patient reports a fall occurred yesterday at 3:00 p.m..  He reports having a mechanical fall after slipping on wet tile. He reports falling and landing on his right side. He states he hit the back of his head against a toilet. The wall was witnessed and he did not lose consciousness.  He was able to ambulate after the fall.  The patient reports waking up this morning with right wrist pain and right elbow pain. He denies neck and back pain. He denies fever, vision changes, speech changes, chest shortness of breath, nausea, vomiting, dysuria, hematuria, diarrhea and constipation. Patient had cardiac stents placed 3 weeks ago and is currently on blood thinners. He takes Norco for pain as needed and did not not take any medications today.           Review of patient's allergies indicates:  No Known Allergies  Past Medical History:   Diagnosis Date    Anxiety     Carbon monoxide poisoning     Caused a seizure    Coronary artery disease     History of heart artery stent 2013    Hypertension     Obesity      Past Surgical History:   Procedure Laterality Date    CORONARY ANGIOPLASTY      LEFT HEART CATHETERIZATION Right 6/25/2018    Procedure: Left heart cath;  Surgeon: Dieter Vargas MD;  Location: [facility] CATH LAB;  Service: Cardiology;  Laterality: Right;     Family History   Problem Relation Age of Onset    Heart disease Mother     Heart disease Father      Social History   Substance Use Topics    Smoking status: Current Every  Day Smoker     Packs/day: 1.00     Years: 20.00     Types: Cigarettes     Last attempt to quit: 2/10/2009    Smokeless tobacco: Never Used    Alcohol use Yes      Comment: occasional. none in 2 months     Review of Systems   Constitutional: Negative for chills and fever.   HENT: Negative for congestion, sore throat and trouble swallowing.    Eyes: Negative for pain, redness and visual disturbance.   Respiratory: Negative for cough and shortness of breath.    Cardiovascular: Negative for chest pain.   Gastrointestinal: Negative for nausea and vomiting.   Genitourinary: Negative for dysuria.   Musculoskeletal: Positive for arthralgias and myalgias. Negative for back pain, gait problem, joint swelling, neck pain and neck stiffness.   Skin: Negative for rash and wound.   Neurological: Negative for weakness, light-headedness, numbness and headaches.       Physical Exam     Initial Vitals [07/18/18 0735]   BP Pulse Resp Temp SpO2   125/76 102 18 97.8 °F (36.6 °C) 97 %      MAP       --         Physical Exam    Constitutional: He appears well-developed and well-nourished. No distress.   HENT:   Head: Normocephalic and atraumatic.   Mouth/Throat: Oropharynx is clear and moist. No oropharyngeal exudate.   Eyes: EOM are normal. Pupils are equal, round, and reactive to light.   Neck: Normal range of motion. Neck supple.   Cardiovascular: Normal rate and regular rhythm.   Pulmonary/Chest: Breath sounds normal. No respiratory distress. He has no wheezes.   Abdominal: Soft. Bowel sounds are normal. He exhibits no distension. There is no tenderness.   Musculoskeletal: Normal range of motion. He exhibits no edema.        Arms:  R posterior elbow TTP. No snuff box tenderness. Full ROM. No midline spinal tenderness. Ambulates without difficulty.   Neurological: He is alert and oriented to person, place, and time. He has normal strength. No cranial nerve deficit or sensory deficit.   Skin: Skin is warm and dry.         ED Course    Procedures  Labs Reviewed - No data to display       Imaging Results          CT Head Without Contrast (Final result)  Result time 07/18/18 11:12:28    Final result by Caio Russell MD (07/18/18 11:12:28)                 Impression:      No evidence of acute intracranial pathology.    Electronically signed by resident: Car Keenan  Date:    07/18/2018  Time:    10:33    Electronically signed by: Caio Russell MD  Date:    07/18/2018  Time:    11:12             Narrative:    EXAMINATION:  CT HEAD WITHOUT CONTRAST    CLINICAL HISTORY:  Headache, post trauma;Fall yesterday, hit occipital region, AAOx3 and neurovascularly intact;    TECHNIQUE:  Low dose axial CT images obtained throughout the head without intravenous contrast. Sagittal and coronal reconstructions were performed.    COMPARISON:  CT head without contrast 06/22/2018.    FINDINGS:  Intracranial compartment:    Ventricles and sulci are normal in size for age without evidence of hydrocephalus.    No extra-axial blood or fluid collections.    The brain parenchyma appears within normal limits.  No parenchymal mass, hemorrhage, edema or major vascular distribution infarct.    Skull/extracranial contents (limited evaluation):    No fracture. Mastoid air cells and paranasal sinuses are essentially clear.                               X-Ray Elbow Complete Right (Final result)  Result time 07/18/18 09:31:58    Final result by Aleksandr Armendariz MD (07/18/18 09:31:58)                 Impression:      As above      Electronically signed by: Aleksandr Armendariz MD  Date:    07/18/2018  Time:    09:31             Narrative:    EXAMINATION:  XR ELBOW COMPLETE 3 VIEW RIGHT    TECHNIQUE:  Three views of the right elbow were obtained, with AP, lateral, and oblique projections submitted.    COMPARISON:  No relevant prior comparison examinations are available.  Clinical information of trauma.    FINDINGS:  Visualized osseous structures appear intact, with no evidence of recent  fracture or other significant abnormality identified.  No demonstrable joint effusion.                               X-Ray Wrist Complete Right (Final result)  Result time 07/18/18 09:29:07    Final result by Aleksandr Armendariz MD (07/18/18 09:29:07)                 Impression:      As above      Electronically signed by: Aleksandr Armnedariz MD  Date:    07/18/2018  Time:    09:29             Narrative:    EXAMINATION:  XR WRIST COMPLETE 3 VIEWS RIGHT    TECHNIQUE:  Three views of the right wrist were obtained, with AP, lateral, and oblique projections submitted.    COMPARISON:  No relevant comparison examinations are currently available.  Clinical information of trauma.    FINDINGS:  Visualized osseous structures appear intact, with no definite evidence of recent fracture or other significant abnormality identified.                                 Medical Decision Making:   History:   Old Medical Records: I decided to obtain old medical records.  Clinical Tests:   Radiological Study: Ordered and Reviewed       APC / Resident Notes:   37-year-old male with medical history of obesity, CAD, hypertension, hyperlipidemia, AFib presents to the ED c/o fall. DDx includes but not limited to contusion, muscular sprain, fracture, intracranial hemorrhage, SDH, CVA. Will get CTH given head trauma and blood thinner use. Will get x-rays of right wrist and elbow.     CTH and x-rays negative for emergent processes. Patient continues to be neurovascularly intact on exam. Patient stable for discharge with PCP follow-up. Advised patient to take home analgesics for ongoing pain management. Return to ED precautions given for new, worsening, or concerning symptoms. I have discussed the care of this patient with my supervising physician.        Scribe Attestation:   Scribe #1: I performed the above scribed service and the documentation accurately describes the services I performed. I attest to the accuracy of the note.    Attending Attestation:      Physician Attestation Statement for NP/PA:   I discussed this assessment and plan of this patient with the NP/PA, but I did not personally examine the patient. The face to face encounter was performed by the NP/PA.                     Clinical Impression:   The primary encounter diagnosis was Traumatic injury of head, initial encounter. Diagnoses of Fall, Right elbow pain, and Right wrist pain were also pertinent to this visit.      Disposition:   Disposition: Discharged  Condition: Stable                        Aaron Lockwood PA-C  07/18/18 1747       Alfredo Streeter MD  07/20/18 8900

## 2018-07-18 NOTE — ED NOTES
Pt presented to the ED via pov. Pt states he had a cardiac cath 2 weeks ago. Pt states he is on a blood thinner. Pt states he slipped and fell backwards and hit his head. Pt denies headache. Pt alert. Pt denies blurred vision. No deformities noted. Pt c/o right hip and right elbow pain.

## 2019-10-17 ENCOUNTER — HOSPITAL ENCOUNTER (OUTPATIENT)
Facility: HOSPITAL | Age: 39
Discharge: HOME | End: 2019-10-18
Attending: EMERGENCY MEDICINE | Admitting: HOSPITALIST
Payer: COMMERCIAL

## 2019-10-17 DIAGNOSIS — R07.9 CHEST PAIN: Primary | ICD-10-CM

## 2019-10-17 DIAGNOSIS — I10 ESSENTIAL HYPERTENSION: Chronic | ICD-10-CM

## 2019-10-17 DIAGNOSIS — I25.10 CORONARY ARTERY DISEASE INVOLVING NATIVE CORONARY ARTERY OF NATIVE HEART WITHOUT ANGINA PECTORIS: ICD-10-CM

## 2019-10-17 DIAGNOSIS — F17.210 CIGARETTE SMOKER: Chronic | ICD-10-CM

## 2019-10-17 DIAGNOSIS — E78.5 HYPERLIPIDEMIA, UNSPECIFIED HYPERLIPIDEMIA TYPE: Chronic | ICD-10-CM

## 2019-10-17 DIAGNOSIS — I48.0 PAROXYSMAL A-FIB: ICD-10-CM

## 2019-10-17 DIAGNOSIS — I50.32 CHRONIC DIASTOLIC HEART FAILURE: ICD-10-CM

## 2019-10-17 DIAGNOSIS — R07.9 CHEST PAIN AT REST: ICD-10-CM

## 2019-10-17 PROBLEM — D72.829 LEUKOCYTOSIS: Status: ACTIVE | Noted: 2019-10-17

## 2019-10-17 LAB
ALBUMIN SERPL BCP-MCNC: 4.5 G/DL (ref 3.5–5.2)
ALP SERPL-CCNC: 78 U/L (ref 55–135)
ALT SERPL W/O P-5'-P-CCNC: 25 U/L (ref 10–44)
ANION GAP SERPL CALC-SCNC: 10 MMOL/L (ref 8–16)
AST SERPL-CCNC: 20 U/L (ref 10–40)
BASOPHILS # BLD AUTO: 0.03 K/UL (ref 0–0.2)
BASOPHILS NFR BLD: 0.2 % (ref 0–1.9)
BILIRUB SERPL-MCNC: 0.2 MG/DL (ref 0.1–1)
BNP SERPL-MCNC: 20 PG/ML (ref 0–99)
BUN SERPL-MCNC: 18 MG/DL (ref 6–20)
CALCIUM SERPL-MCNC: 9.3 MG/DL (ref 8.7–10.5)
CHLORIDE SERPL-SCNC: 106 MMOL/L (ref 95–110)
CO2 SERPL-SCNC: 24 MMOL/L (ref 23–29)
CREAT SERPL-MCNC: 1.1 MG/DL (ref 0.5–1.4)
DIFFERENTIAL METHOD: ABNORMAL
EOSINOPHIL # BLD AUTO: 0 K/UL (ref 0–0.5)
EOSINOPHIL NFR BLD: 0.2 % (ref 0–8)
ERYTHROCYTE [DISTWIDTH] IN BLOOD BY AUTOMATED COUNT: 12.7 % (ref 11.5–14.5)
EST. GFR  (AFRICAN AMERICAN): >60 ML/MIN/1.73 M^2
EST. GFR  (NON AFRICAN AMERICAN): >60 ML/MIN/1.73 M^2
GLUCOSE SERPL-MCNC: 87 MG/DL (ref 70–110)
HCT VFR BLD AUTO: 43.8 % (ref 40–54)
HGB BLD-MCNC: 14.7 G/DL (ref 14–18)
IMM GRANULOCYTES # BLD AUTO: 0.12 K/UL (ref 0–0.04)
IMM GRANULOCYTES NFR BLD AUTO: 0.7 % (ref 0–0.5)
LACTATE SERPL-SCNC: 3.4 MMOL/L (ref 0.5–2.2)
LYMPHOCYTES # BLD AUTO: 2.4 K/UL (ref 1–4.8)
LYMPHOCYTES NFR BLD: 14.5 % (ref 18–48)
MCH RBC QN AUTO: 30.6 PG (ref 27–31)
MCHC RBC AUTO-ENTMCNC: 33.6 G/DL (ref 32–36)
MCV RBC AUTO: 91 FL (ref 82–98)
MONOCYTES # BLD AUTO: 1.2 K/UL (ref 0.3–1)
MONOCYTES NFR BLD: 7.3 % (ref 4–15)
NEUTROPHILS # BLD AUTO: 12.9 K/UL (ref 1.8–7.7)
NEUTROPHILS NFR BLD: 77.1 % (ref 38–73)
NRBC BLD-RTO: 0 /100 WBC
PLATELET # BLD AUTO: 281 K/UL (ref 150–350)
PMV BLD AUTO: 10.2 FL (ref 9.2–12.9)
POTASSIUM SERPL-SCNC: 4 MMOL/L (ref 3.5–5.1)
PROCALCITONIN SERPL IA-MCNC: 0.03 NG/ML
PROT SERPL-MCNC: 7.5 G/DL (ref 6–8.4)
RBC # BLD AUTO: 4.81 M/UL (ref 4.6–6.2)
SODIUM SERPL-SCNC: 140 MMOL/L (ref 136–145)
TROPONIN I SERPL DL<=0.01 NG/ML-MCNC: <0.006 NG/ML (ref 0–0.03)
TROPONIN I SERPL DL<=0.01 NG/ML-MCNC: <0.006 NG/ML (ref 0–0.03)
WBC # BLD AUTO: 16.7 K/UL (ref 3.9–12.7)

## 2019-10-17 PROCEDURE — 99285 EMERGENCY DEPT VISIT HI MDM: CPT | Mod: 25

## 2019-10-17 PROCEDURE — 84484 ASSAY OF TROPONIN QUANT: CPT

## 2019-10-17 PROCEDURE — 99226 PR SUBSEQUENT OBSERVATION CARE,LEVEL III: CPT | Mod: ,,, | Performed by: INTERNAL MEDICINE

## 2019-10-17 PROCEDURE — 85025 COMPLETE CBC W/AUTO DIFF WBC: CPT

## 2019-10-17 PROCEDURE — 84145 PROCALCITONIN (PCT): CPT

## 2019-10-17 PROCEDURE — 25000003 PHARM REV CODE 250: Performed by: PHYSICIAN ASSISTANT

## 2019-10-17 PROCEDURE — 93010 ELECTROCARDIOGRAM REPORT: CPT | Mod: ,,, | Performed by: INTERNAL MEDICINE

## 2019-10-17 PROCEDURE — 93005 ELECTROCARDIOGRAM TRACING: CPT

## 2019-10-17 PROCEDURE — 99220 PR INITIAL OBSERVATION CARE,LEVL III: ICD-10-PCS | Mod: ,,, | Performed by: PHYSICIAN ASSISTANT

## 2019-10-17 PROCEDURE — 99285 EMERGENCY DEPT VISIT HI MDM: CPT | Mod: ,,, | Performed by: EMERGENCY MEDICINE

## 2019-10-17 PROCEDURE — 83880 ASSAY OF NATRIURETIC PEPTIDE: CPT

## 2019-10-17 PROCEDURE — G0378 HOSPITAL OBSERVATION PER HR: HCPCS

## 2019-10-17 PROCEDURE — 80053 COMPREHEN METABOLIC PANEL: CPT

## 2019-10-17 PROCEDURE — 99285 PR EMERGENCY DEPT VISIT,LEVEL V: ICD-10-PCS | Mod: ,,, | Performed by: EMERGENCY MEDICINE

## 2019-10-17 PROCEDURE — 83605 ASSAY OF LACTIC ACID: CPT

## 2019-10-17 PROCEDURE — 99226 PR SUBSEQUENT OBSERVATION CARE,LEVEL III: ICD-10-PCS | Mod: ,,, | Performed by: INTERNAL MEDICINE

## 2019-10-17 PROCEDURE — 99220 PR INITIAL OBSERVATION CARE,LEVL III: CPT | Mod: ,,, | Performed by: PHYSICIAN ASSISTANT

## 2019-10-17 PROCEDURE — 93010 EKG 12-LEAD: ICD-10-PCS | Mod: ,,, | Performed by: INTERNAL MEDICINE

## 2019-10-17 RX ORDER — ACETAMINOPHEN 325 MG/1
650 TABLET ORAL EVERY 4 HOURS PRN
Status: DISCONTINUED | OUTPATIENT
Start: 2019-10-17 | End: 2019-10-18 | Stop reason: HOSPADM

## 2019-10-17 RX ORDER — ONDANSETRON 4 MG/1
4 TABLET, ORALLY DISINTEGRATING ORAL EVERY 8 HOURS PRN
Status: DISCONTINUED | OUTPATIENT
Start: 2019-10-17 | End: 2019-10-18 | Stop reason: HOSPADM

## 2019-10-17 RX ORDER — SODIUM CHLORIDE 0.9 % (FLUSH) 0.9 %
5 SYRINGE (ML) INJECTION
Status: DISCONTINUED | OUTPATIENT
Start: 2019-10-17 | End: 2019-10-18 | Stop reason: HOSPADM

## 2019-10-17 RX ORDER — AMOXICILLIN 250 MG
1 CAPSULE ORAL 2 TIMES DAILY PRN
Status: DISCONTINUED | OUTPATIENT
Start: 2019-10-17 | End: 2019-10-18 | Stop reason: HOSPADM

## 2019-10-17 RX ORDER — CLONAZEPAM 1 MG/1
1 TABLET ORAL 2 TIMES DAILY PRN
Status: DISCONTINUED | OUTPATIENT
Start: 2019-10-17 | End: 2019-10-18 | Stop reason: HOSPADM

## 2019-10-17 RX ORDER — IPRATROPIUM BROMIDE AND ALBUTEROL SULFATE 2.5; .5 MG/3ML; MG/3ML
3 SOLUTION RESPIRATORY (INHALATION) EVERY 4 HOURS PRN
Status: DISCONTINUED | OUTPATIENT
Start: 2019-10-17 | End: 2019-10-18 | Stop reason: HOSPADM

## 2019-10-17 RX ORDER — HYDROCODONE BITARTRATE AND ACETAMINOPHEN 7.5; 325 MG/1; MG/1
1 TABLET ORAL EVERY 8 HOURS PRN
Status: DISCONTINUED | OUTPATIENT
Start: 2019-10-17 | End: 2019-10-18 | Stop reason: HOSPADM

## 2019-10-17 RX ORDER — IBUPROFEN 200 MG
16 TABLET ORAL
Status: DISCONTINUED | OUTPATIENT
Start: 2019-10-17 | End: 2019-10-18 | Stop reason: HOSPADM

## 2019-10-17 RX ORDER — ATORVASTATIN CALCIUM 20 MG/1
80 TABLET, FILM COATED ORAL NIGHTLY
Status: DISCONTINUED | OUTPATIENT
Start: 2019-10-17 | End: 2019-10-18 | Stop reason: HOSPADM

## 2019-10-17 RX ORDER — SIMVASTATIN 20 MG/1
40 TABLET, FILM COATED ORAL NIGHTLY
Status: DISCONTINUED | OUTPATIENT
Start: 2019-10-17 | End: 2019-10-17

## 2019-10-17 RX ORDER — NICOTINE 7MG/24HR
1 PATCH, TRANSDERMAL 24 HOURS TRANSDERMAL DAILY
Status: DISCONTINUED | OUTPATIENT
Start: 2019-10-18 | End: 2019-10-18 | Stop reason: HOSPADM

## 2019-10-17 RX ORDER — GLUCAGON 1 MG
1 KIT INJECTION
Status: DISCONTINUED | OUTPATIENT
Start: 2019-10-17 | End: 2019-10-18 | Stop reason: HOSPADM

## 2019-10-17 RX ORDER — ASPIRIN 81 MG/1
81 TABLET ORAL DAILY
Status: DISCONTINUED | OUTPATIENT
Start: 2019-10-18 | End: 2019-10-18 | Stop reason: HOSPADM

## 2019-10-17 RX ORDER — LISINOPRIL 10 MG/1
10 TABLET ORAL DAILY
Status: DISCONTINUED | OUTPATIENT
Start: 2019-10-18 | End: 2019-10-18 | Stop reason: HOSPADM

## 2019-10-17 RX ORDER — IBUPROFEN 200 MG
24 TABLET ORAL
Status: DISCONTINUED | OUTPATIENT
Start: 2019-10-17 | End: 2019-10-18 | Stop reason: HOSPADM

## 2019-10-17 RX ORDER — ASPIRIN 325 MG
325 TABLET ORAL
Status: COMPLETED | OUTPATIENT
Start: 2019-10-17 | End: 2019-10-17

## 2019-10-17 RX ORDER — RAMELTEON 8 MG/1
8 TABLET ORAL NIGHTLY PRN
Status: DISCONTINUED | OUTPATIENT
Start: 2019-10-17 | End: 2019-10-18 | Stop reason: HOSPADM

## 2019-10-17 RX ADMIN — HYDROCODONE BITARTRATE AND ACETAMINOPHEN 1 TABLET: 7.5; 325 TABLET ORAL at 10:10

## 2019-10-17 RX ADMIN — ASPIRIN 325 MG ORAL TABLET 325 MG: 325 PILL ORAL at 05:10

## 2019-10-17 RX ADMIN — CLONAZEPAM 1 MG: 1 TABLET ORAL at 10:10

## 2019-10-17 NOTE — ED TRIAGE NOTES
Pt had stabbing chest pain that started about a hour ago with numbness in center of chest and throat. Has a history of two heart attacks which required stents. Denies SOB and no chest pain at this time    LOC: The patient is awake, alert, and oriented to place, time, situation. Affect is appropriate.  Speech is appropriate and clear.     APPEARANCE: Patient resting comfortably in no acute distress.  Patient is clean and well groomed.    SKIN: The skin is warm and dry; color consistent with ethnicity.  Patient has normal skin turgor and moist mucus membranes.  Skin intact; no breakdown or bruising noted.     MUSCULOSKELETAL: Patient moving upper and lower extremities without difficulty.  Denies weakness.     RESPIRATORY: Airway is open and patent. Respirations spontaneous, even, easy, and non-labored.  Patient has a normal effort and rate.  No accessory muscle use noted. Denies cough.     CARDIAC:  Normal rhythm and rate noted.  No peripheral edema noted. No complaints of chest pain at this time      ABDOMEN: Soft and non tender to palpation.  No distention noted.     NEUROLOGIC: Eyes open spontaneously.  Behavior appropriate to situation.  Follows commands; facial expression symmetrical.  Purposeful motor response noted; normal sensation in all extremities.

## 2019-10-17 NOTE — ED PROVIDER NOTES
"Encounter Date: 10/17/2019       History     Chief Complaint   Patient presents with    Chest Pain     started 10 minutes ago, numbness in chest that radiates to throat. Hx of 2 MI's with 4 stents. Doesnt feel like MI's before.      Patient is a 38-year-old white male with a PMH of HTN, HLD, CAD s/p PCI (4 stents) who presents to the ED for urgent evaluation of chest discomfort.  Patient states he was outside smoking a cigarette when he started to experience a stabbing numbness" in the center of his chest that radiated to his neck.  This occurred at 3:40 p.m. (20 min PTA). He states the sensation spontaneously resolved within minutes. He states the sensation is not similar in quality to his past MI's, though occurred in the same location which prompted his ED visit. He denies exertional chest pain or dyspnea. He denies recent heavy lifting or trauma. He denies fever/chills, cough, SOB, abdominal pain, N/V/D, dysuria, or focal weakness.     The history is provided by the patient.     Review of patient's allergies indicates:  No Known Allergies  Past Medical History:   Diagnosis Date    Anxiety     Carbon monoxide poisoning     Caused a seizure    Coronary artery disease     History of heart artery stent 2013    Hypertension     Obesity      Past Surgical History:   Procedure Laterality Date    CORONARY ANGIOPLASTY      LEFT HEART CATHETERIZATION Right 6/25/2018    Procedure: Left heart cath;  Surgeon: Dieter Vargas MD;  Location: [facility] CATH LAB;  Service: Cardiology;  Laterality: Right;     Family History   Problem Relation Age of Onset    Heart disease Mother     Heart disease Father      Social History     Tobacco Use    Smoking status: Current Every Day Smoker     Packs/day: 1.00     Years: 20.00     Pack years: 20.00     Types: Cigarettes     Last attempt to quit: 2/10/2009     Years since quitting: 10.6    Smokeless tobacco: Never Used   Substance Use Topics    Alcohol use: Yes     Comment: " occasional. none in 2 months    Drug use: Yes     Types: Marijuana     Comment: occasional marijuana use.last 2 weeks ago     Review of Systems   Constitutional: Negative for fever.   HENT: Negative for sore throat.    Respiratory: Negative for shortness of breath.    Cardiovascular: Positive for chest pain (discomfort).   Gastrointestinal: Negative for constipation, diarrhea, nausea and vomiting.   Genitourinary: Negative for dysuria.   Musculoskeletal: Negative for back pain.   Skin: Negative for rash.   Neurological: Negative for weakness.   Hematological: Does not bruise/bleed easily.   Psychiatric/Behavioral: Negative for dysphoric mood.       Physical Exam     Initial Vitals [10/17/19 1558]   BP Pulse Resp Temp SpO2   (!) 142/82 (!) 117 18 98.5 °F (36.9 °C) 98 %      MAP       --         Physical Exam    Nursing note and vitals reviewed.  Constitutional: He appears well-developed and well-nourished. He is not diaphoretic. No distress.   HENT:   Head: Normocephalic and atraumatic.   Mouth/Throat: Oropharynx is clear and moist. No oropharyngeal exudate.   Eyes: Conjunctivae and EOM are normal. Pupils are equal, round, and reactive to light.   Neck: Normal range of motion. Neck supple.   Cardiovascular: Normal rate, regular rhythm, normal heart sounds and intact distal pulses.   No murmur heard.  No carotid bruits heard.   Pulmonary/Chest: Breath sounds normal. No respiratory distress. He has no decreased breath sounds. He has no wheezes. He has no rales.   Abdominal: Soft. Bowel sounds are normal. There is no tenderness. There is no rebound and no guarding.   Musculoskeletal: Normal range of motion. He exhibits no edema or tenderness.   Neurological: He is alert and oriented to person, place, and time. GCS score is 15. GCS eye subscore is 4. GCS verbal subscore is 5. GCS motor subscore is 6.   Skin: Skin is warm and dry.   Psychiatric: He has a normal mood and affect. Thought content normal.         ED Course    Procedures  Labs Reviewed   CBC W/ AUTO DIFFERENTIAL - Abnormal; Notable for the following components:       Result Value    WBC 16.70 (*)     Immature Granulocytes 0.7 (*)     Gran # (ANC) 12.9 (*)     Immature Grans (Abs) 0.12 (*)     Mono # 1.2 (*)     Gran% 77.1 (*)     Lymph% 14.5 (*)     All other components within normal limits    Narrative:     shared lavender   COMPREHENSIVE METABOLIC PANEL    Narrative:     shared lavender   TROPONIN I    Narrative:     shared lavender   B-TYPE NATRIURETIC PEPTIDE    Narrative:     shared lavender   TROPONIN I          Imaging Results          X-Ray Chest PA And Lateral (Final result)  Result time 10/17/19 17:22:55    Final result by Casimiro Cade MD (10/17/19 17:22:55)                 Impression:      1. No acute cardiopulmonary process.      Electronically signed by: Casimiro Cade MD  Date:    10/17/2019  Time:    17:22             Narrative:    EXAMINATION:  XR CHEST PA AND LATERAL    CLINICAL HISTORY:  Chest Pain;    TECHNIQUE:  PA and lateral views of the chest were performed.    COMPARISON:  06/21/2018    FINDINGS:  The cardiomediastinal silhouette is not enlarged..  There is no pleural effusion.  The trachea is midline.  The lungs are symmetrically expanded bilaterally without evidence of acute parenchymal process. No large focal consolidation seen.  There is no pneumothorax.  The osseous structures are unremarkable.                                 Medical Decision Making:   History:   Old Medical Records: I decided to obtain old medical records.  Clinical Tests:   Lab Tests: Ordered and Reviewed  Radiological Study: Ordered and Reviewed  Medical Tests: Ordered and Reviewed    Additional MDM:   Heart Score:    History:          Moderately suspicious.  ECG:             Normal  Age:               Less than 45 years  Risk factors: >= 3 risk factors or history of atherosclerotic disease  Troponin:       Less than or equal to normal limit  Final Score:  3        APC / Resident Notes:   38 year old patient with hx of HTN, HLD, CAD presenting secondary to chest discomfort. Patient is at an intermediate risk of ACS due to risk factors and HPI with a heart score of 3. Patient has received an aspirin. Troponins, Cxr, ekg, and labs ordered which showed 1st troponin negative, chest x-ray without acute abnormality, EKG NSR, CBC with leukocytosis, CMP unremarkable.     Given significant history, cardiology was consulted for their recommendations. Patient signed out to oncoming resident, who will determine final disposition pending labs and cardiology consult. Patient informed of plan and is agreeable.     Kathleen Kumar PA-C  7:02 PM    HO5 [place] of Care:  I assumed care of the patient from Vidya Kumar. Patient currently comfortable and has had no further episodes of chest discomfort since arrival. Labs singificant for leukocytosis of 16.7K and initial negative troponin. Infectious ROS negative so unclear why WBC is elevated. Awaiting cardiology evaluation for final dispo.  Gwen Golden MD  [facility] Medicine PGY-5  10/17/2019 7:10 PM    HO5 Update:  Cardiology recommending observation for ACS rule out. Has had no furhter episodes of chest pain while in the ER thus far.  Gwen Golden MD  [facility]/Internal Medicine PGY-5  10/17/2019 8:52 PM               Attending Attestation:   Physician Attestation Statement for Resident:  As the supervising MD   Physician Attestation Statement: I have personally seen and examined this patient.   I agree with the above history. -:   As the supervising MD I agree with the above PE.    As the supervising MD I agree with the above treatment, course, plan, and disposition.    Physician Attestation Statement for NP/PA:   I have conducted a face to face encounter with this patient in addition to the NP/PA, due to Medical Complexity    Other NP/PA Attestation Additions:      Medical Decision  Making: ACS, extensive cardiac history.  Will obs for stress in AM.                      Clinical Impression:       ICD-10-CM ICD-9-CM   1. Chest pain R07.9 786.50                                Gwen Golden MD  Resident  10/17/19 2051       Justino Goff MD  10/18/19 8915

## 2019-10-18 ENCOUNTER — CLINICAL SUPPORT (OUTPATIENT)
Dept: CARDIOLOGY | Facility: CLINIC | Age: 39
End: 2019-10-18
Payer: COMMERCIAL

## 2019-10-18 VITALS
RESPIRATION RATE: 18 BRPM | HEART RATE: 81 BPM | WEIGHT: 234 LBS | TEMPERATURE: 97 F | OXYGEN SATURATION: 100 % | SYSTOLIC BLOOD PRESSURE: 120 MMHG | BODY MASS INDEX: 36.73 KG/M2 | HEIGHT: 67 IN | DIASTOLIC BLOOD PRESSURE: 69 MMHG

## 2019-10-18 PROBLEM — I50.32 CHRONIC DIASTOLIC HEART FAILURE: Status: ACTIVE | Noted: 2018-06-22

## 2019-10-18 LAB
ANION GAP SERPL CALC-SCNC: 8 MMOL/L (ref 8–16)
ASCENDING AORTA: 2.6 CM
AV INDEX (PROSTH): 0.82
AV MEAN GRADIENT: 4 MMHG
AV PEAK GRADIENT: 6 MMHG
AV VALVE AREA: 2.72 CM2
AV VELOCITY RATIO: 0.87
BASOPHILS # BLD AUTO: 0.06 K/UL (ref 0–0.2)
BASOPHILS NFR BLD: 0.5 % (ref 0–1.9)
BSA FOR ECHO PROCEDURE: 2.24 M2
BUN SERPL-MCNC: 18 MG/DL (ref 6–20)
CALCIUM SERPL-MCNC: 8.5 MG/DL (ref 8.7–10.5)
CHLORIDE SERPL-SCNC: 108 MMOL/L (ref 95–110)
CHOLEST SERPL-MCNC: 184 MG/DL (ref 120–199)
CHOLEST/HDLC SERPL: 7.7 {RATIO} (ref 2–5)
CO2 SERPL-SCNC: 24 MMOL/L (ref 23–29)
CREAT SERPL-MCNC: 1 MG/DL (ref 0.5–1.4)
CV ECHO LV RWT: 0.28 CM
CV STRESS BASE HR: 81 BPM
DIASTOLIC BLOOD PRESSURE: 77 MMHG
DIFFERENTIAL METHOD: ABNORMAL
DOP CALC AO PEAK VEL: 1.25 M/S
DOP CALC AO VTI: 26.58 CM
DOP CALC LVOT AREA: 3.3 CM2
DOP CALC LVOT DIAMETER: 2.06 CM
DOP CALC LVOT PEAK VEL: 1.09 M/S
DOP CALC LVOT STROKE VOLUME: 72.35 CM3
DOP CALCLVOT PEAK VEL VTI: 21.72 CM
E WAVE DECELERATION TIME: 158.3 MSEC
E/A RATIO: 2.18
E/E' RATIO: 9.65 M/S
ECHO LV POSTERIOR WALL: 0.78 CM (ref 0.6–1.1)
END DIASTOLIC INDEX-HIGH: 170 ML/M2
END SYSTOLIC INDEX-HIGH: 70 ML/M2
EOSINOPHIL # BLD AUTO: 0.1 K/UL (ref 0–0.5)
EOSINOPHIL NFR BLD: 1.1 % (ref 0–8)
ERYTHROCYTE [DISTWIDTH] IN BLOOD BY AUTOMATED COUNT: 12.9 % (ref 11.5–14.5)
EST. GFR  (AFRICAN AMERICAN): >60 ML/MIN/1.73 M^2
EST. GFR  (NON AFRICAN AMERICAN): >60 ML/MIN/1.73 M^2
FRACTIONAL SHORTENING: 20 % (ref 28–44)
GLUCOSE SERPL-MCNC: 123 MG/DL (ref 70–110)
HCT VFR BLD AUTO: 41.4 % (ref 40–54)
HDLC SERPL-MCNC: 24 MG/DL (ref 40–75)
HDLC SERPL: 13 % (ref 20–50)
HGB BLD-MCNC: 13.1 G/DL (ref 14–18)
IMM GRANULOCYTES # BLD AUTO: 0.04 K/UL (ref 0–0.04)
IMM GRANULOCYTES NFR BLD AUTO: 0.4 % (ref 0–0.5)
INTERVENTRICULAR SEPTUM: 0.7 CM (ref 0.6–1.1)
IVRT: 0.08 MSEC
LA MAJOR: 5.93 CM
LA MINOR: 5.89 CM
LA WIDTH: 3.9 CM
LDLC SERPL CALC-MCNC: 105 MG/DL (ref 63–159)
LEFT ATRIUM SIZE: 4.04 CM
LEFT ATRIUM VOLUME INDEX: 36.6 ML/M2
LEFT ATRIUM VOLUME: 79.15 CM3
LEFT INTERNAL DIMENSION IN SYSTOLE: 4.43 CM (ref 2.1–4)
LEFT VENTRICLE DIASTOLIC VOLUME INDEX: 70.13 ML/M2
LEFT VENTRICLE DIASTOLIC VOLUME: 151.6 ML
LEFT VENTRICLE MASS INDEX: 69 G/M2
LEFT VENTRICLE SYSTOLIC VOLUME INDEX: 41.3 ML/M2
LEFT VENTRICLE SYSTOLIC VOLUME: 89.33 ML
LEFT VENTRICULAR INTERNAL DIMENSION IN DIASTOLE: 5.57 CM (ref 3.5–6)
LEFT VENTRICULAR MASS: 148.36 G
LV LATERAL E/E' RATIO: 10.09 M/S
LV SEPTAL E/E' RATIO: 9.25 M/S
LYMPHOCYTES # BLD AUTO: 3.6 K/UL (ref 1–4.8)
LYMPHOCYTES NFR BLD: 32.9 % (ref 18–48)
MAGNESIUM SERPL-MCNC: 1.9 MG/DL (ref 1.6–2.6)
MCH RBC QN AUTO: 30.8 PG (ref 27–31)
MCHC RBC AUTO-ENTMCNC: 31.6 G/DL (ref 32–36)
MCV RBC AUTO: 97 FL (ref 82–98)
MONOCYTES # BLD AUTO: 0.7 K/UL (ref 0.3–1)
MONOCYTES NFR BLD: 6.6 % (ref 4–15)
MV PEAK A VEL: 0.51 M/S
MV PEAK E VEL: 1.11 M/S
NEUTROPHILS # BLD AUTO: 6.4 K/UL (ref 1.8–7.7)
NEUTROPHILS NFR BLD: 58.5 % (ref 38–73)
NONHDLC SERPL-MCNC: 160 MG/DL
NRBC BLD-RTO: 0 /100 WBC
NUC REST DIASTOLIC VOLUME INDEX: 149
NUC REST EJECTION FRACTION: 40
NUC REST SYSTOLIC VOLUME INDEX: 90
NUC STRESS DIASTOLIC VOLUME INDEX: 180
NUC STRESS EJECTION FRACTION: 45 %
NUC STRESS SYSTOLIC VOLUME INDEX: 98
OHS CV CPX 85 PERCENT MAX PREDICTED HEART RATE MALE: 154
OHS CV CPX MAX PREDICTED HEART RATE: 181
OHS CV CPX PATIENT IS FEMALE: 0
OHS CV CPX PATIENT IS MALE: 1
OHS CV CPX PEAK DIASTOLIC BLOOD PRESSURE: 75 MMHG
OHS CV CPX PEAK HEAR RATE: 155 BPM
OHS CV CPX PEAK RATE PRESSURE PRODUCT: NORMAL
OHS CV CPX PEAK SYSTOLIC BLOOD PRESSURE: 153 MMHG
OHS CV CPX PERCENT MAX PREDICTED HEART RATE ACHIEVED: 86
OHS CV CPX RATE PRESSURE PRODUCT PRESENTING: NORMAL
PHOSPHATE SERPL-MCNC: 4.2 MG/DL (ref 2.7–4.5)
PISA TR MAX VEL: 1.98 M/S
PLATELET # BLD AUTO: 256 K/UL (ref 150–350)
PMV BLD AUTO: 10.2 FL (ref 9.2–12.9)
POTASSIUM SERPL-SCNC: 3.8 MMOL/L (ref 3.5–5.1)
PULM VEIN S/D RATIO: 1.1
PV PEAK D VEL: 0.39 M/S
PV PEAK S VEL: 0.43 M/S
RA MAJOR: 5.13 CM
RA PRESSURE: 3 MMHG
RA WIDTH: 4.29 CM
RBC # BLD AUTO: 4.26 M/UL (ref 4.6–6.2)
RETIRED EF AND QEF - SEE NOTES: 51 %
RIGHT VENTRICULAR END-DIASTOLIC DIMENSION: 2.87 CM
RV TISSUE DOPPLER FREE WALL SYSTOLIC VELOCITY 1 (APICAL 4 CHAMBER VIEW): 13.55 CM/S
SINUS: 2.93 CM
SODIUM SERPL-SCNC: 140 MMOL/L (ref 136–145)
STJ: 2.27 CM
STRESS ECHO TARGET HR: 153.85 BPM
SYSTOLIC BLOOD PRESSURE: 125 MMHG
TDI LATERAL: 0.11 M/S
TDI SEPTAL: 0.12 M/S
TDI: 0.12 M/S
TR MAX PG: 16 MMHG
TRICUSPID ANNULAR PLANE SYSTOLIC EXCURSION: 1.88 CM
TRIGL SERPL-MCNC: 275 MG/DL (ref 30–150)
TROPONIN I SERPL DL<=0.01 NG/ML-MCNC: <0.006 NG/ML (ref 0–0.03)
TV REST PULMONARY ARTERY PRESSURE: 19 MMHG
WBC # BLD AUTO: 10.95 K/UL (ref 3.9–12.7)

## 2019-10-18 PROCEDURE — 93016 CV STRESS TEST SUPVJ ONLY: CPT | Mod: ,,, | Performed by: INTERNAL MEDICINE

## 2019-10-18 PROCEDURE — G0378 HOSPITAL OBSERVATION PER HR: HCPCS

## 2019-10-18 PROCEDURE — 84100 ASSAY OF PHOSPHORUS: CPT

## 2019-10-18 PROCEDURE — 80048 BASIC METABOLIC PNL TOTAL CA: CPT

## 2019-10-18 PROCEDURE — 99217 PR OBSERVATION CARE DISCHARGE: ICD-10-PCS | Mod: ,,, | Performed by: NURSE PRACTITIONER

## 2019-10-18 PROCEDURE — 25000003 PHARM REV CODE 250: Performed by: NURSE PRACTITIONER

## 2019-10-18 PROCEDURE — S4991 NICOTINE PATCH NONLEGEND: HCPCS | Performed by: PHYSICIAN ASSISTANT

## 2019-10-18 PROCEDURE — 93018 STRESS TEST WITH MYOCARDIAL PERFUSION (CUPID ONLY): ICD-10-PCS | Mod: ,,, | Performed by: INTERNAL MEDICINE

## 2019-10-18 PROCEDURE — 93016 STRESS TEST WITH MYOCARDIAL PERFUSION (CUPID ONLY): ICD-10-PCS | Mod: ,,, | Performed by: INTERNAL MEDICINE

## 2019-10-18 PROCEDURE — 36415 COLL VENOUS BLD VENIPUNCTURE: CPT

## 2019-10-18 PROCEDURE — 78452 HT MUSCLE IMAGE SPECT MULT: CPT

## 2019-10-18 PROCEDURE — 93018 CV STRESS TEST I&R ONLY: CPT | Mod: ,,, | Performed by: INTERNAL MEDICINE

## 2019-10-18 PROCEDURE — 85025 COMPLETE CBC W/AUTO DIFF WBC: CPT

## 2019-10-18 PROCEDURE — 83735 ASSAY OF MAGNESIUM: CPT

## 2019-10-18 PROCEDURE — 25000003 PHARM REV CODE 250: Performed by: PHYSICIAN ASSISTANT

## 2019-10-18 PROCEDURE — 84484 ASSAY OF TROPONIN QUANT: CPT

## 2019-10-18 PROCEDURE — 78452 STRESS TEST WITH MYOCARDIAL PERFUSION (CUPID ONLY): ICD-10-PCS | Mod: 26,,, | Performed by: INTERNAL MEDICINE

## 2019-10-18 PROCEDURE — 80061 LIPID PANEL: CPT

## 2019-10-18 PROCEDURE — 63600175 PHARM REV CODE 636 W HCPCS: Performed by: INTERNAL MEDICINE

## 2019-10-18 PROCEDURE — 78452 HT MUSCLE IMAGE SPECT MULT: CPT | Mod: 26,,, | Performed by: INTERNAL MEDICINE

## 2019-10-18 PROCEDURE — 99217 PR OBSERVATION CARE DISCHARGE: CPT | Mod: ,,, | Performed by: NURSE PRACTITIONER

## 2019-10-18 RX ORDER — LISINOPRIL 10 MG/1
10 TABLET ORAL DAILY
Qty: 90 TABLET | Refills: 17 | Status: SHIPPED | OUTPATIENT
Start: 2019-10-18 | End: 2024-03-22

## 2019-10-18 RX ORDER — POTASSIUM CHLORIDE 750 MG/1
20 CAPSULE, EXTENDED RELEASE ORAL ONCE
Status: COMPLETED | OUTPATIENT
Start: 2019-10-18 | End: 2019-10-18

## 2019-10-18 RX ORDER — ATORVASTATIN CALCIUM 80 MG/1
80 TABLET, FILM COATED ORAL NIGHTLY
Qty: 90 TABLET | Refills: 3 | Status: SHIPPED | OUTPATIENT
Start: 2019-10-18 | End: 2020-02-21

## 2019-10-18 RX ADMIN — POTASSIUM CHLORIDE 20 MEQ: 750 CAPSULE, EXTENDED RELEASE ORAL at 09:10

## 2019-10-18 RX ADMIN — HYDROCODONE BITARTRATE AND ACETAMINOPHEN 1 TABLET: 7.5; 325 TABLET ORAL at 09:10

## 2019-10-18 RX ADMIN — HYDROCODONE BITARTRATE AND ACETAMINOPHEN 1 TABLET: 7.5; 325 TABLET ORAL at 05:10

## 2019-10-18 RX ADMIN — LISINOPRIL 10 MG: 10 TABLET ORAL at 09:10

## 2019-10-18 RX ADMIN — HUMAN ALBUMIN MICROSPHERES AND PERFLUTREN 0.66 MG: 10; .22 INJECTION, SOLUTION INTRAVENOUS at 02:10

## 2019-10-18 RX ADMIN — CLONAZEPAM 1 MG: 1 TABLET ORAL at 09:10

## 2019-10-18 RX ADMIN — NICOTINE 1 PATCH: 7 PATCH, EXTENDED RELEASE TRANSDERMAL at 09:10

## 2019-10-18 RX ADMIN — ASPIRIN 81 MG: 81 TABLET, COATED ORAL at 09:10

## 2019-10-18 RX ADMIN — CLONAZEPAM 1 MG: 1 TABLET ORAL at 05:10

## 2019-10-18 NOTE — SUBJECTIVE & OBJECTIVE
Past Medical History:   Diagnosis Date    Anxiety     Carbon monoxide poisoning     Caused a seizure    Coronary artery disease     History of heart artery stent 2013    Hypertension     Obesity        Past Surgical History:   Procedure Laterality Date    CORONARY ANGIOPLASTY      LEFT HEART CATHETERIZATION Right 6/25/2018    Procedure: Left heart cath;  Surgeon: Dieter Vargas MD;  Location: [facility] CATH LAB;  Service: Cardiology;  Laterality: Right;       Review of patient's allergies indicates:  No Known Allergies    No current facility-administered medications on file prior to encounter.      Current Outpatient Medications on File Prior to Encounter   Medication Sig    aspirin (ECOTRIN) 81 MG EC tablet Take 1 tablet (81 mg total) by mouth once daily.    clonazepam (KLONOPIN) 1 MG tablet Take 1 mg by mouth 2 (two) times daily as needed.    clopidogrel (PLAVIX) 75 mg tablet Take 1 tablet (75 mg total) by mouth once daily.    HYDROCODONE/ACETAMINOPHEN (NORCO ORAL) Take 7.5 mg by mouth every 6 (six) hours as needed.     lisinopril (PRINIVIL,ZESTRIL) 5 MG tablet Take 10 mg by mouth once daily.    simvastatin (ZOCOR) 40 MG tablet Take 40 mg by mouth every evening.     Family History     Problem Relation (Age of Onset)    Heart disease Mother, Father        Tobacco Use    Smoking status: Current Every Day Smoker     Packs/day: 1.00     Years: 20.00     Pack years: 20.00     Types: Cigarettes     Last attempt to quit: 2/10/2009     Years since quitting: 10.6    Smokeless tobacco: Never Used   Substance and Sexual Activity    Alcohol use: Yes     Comment: occasional. none in 2 months    Drug use: Yes     Types: Marijuana     Comment: occasional marijuana use.last 2 weeks ago    Sexual activity: Yes     Partners: Female     Review of Systems   Constitutional: Negative for chills, fatigue, fever and unexpected weight change.   HENT: Negative for ear pain and sore throat.    Eyes: Negative for pain and  visual disturbance.   Respiratory: Positive for cough (chronic, smoker's cough). Negative for shortness of breath.    Cardiovascular: Positive for chest pain (resolved). Negative for palpitations.   Gastrointestinal: Negative for abdominal pain, diarrhea, nausea and vomiting.   Endocrine: Negative for heat intolerance and polydipsia.   Genitourinary: Negative for dysuria, frequency and urgency.   Musculoskeletal: Positive for back pain (chronic) and neck pain (chronic). Negative for gait problem.   Skin: Negative for rash and wound.   Neurological: Negative for dizziness and weakness.   Psychiatric/Behavioral: Negative for confusion. The patient is not nervous/anxious.      Objective:     Vital Signs (Most Recent):  Temp: 98.5 °F (36.9 °C) (10/17/19 1558)  Pulse: 72 (10/17/19 2147)  Resp: 19 (10/17/19 2147)  BP: 120/61 (10/17/19 2147)  SpO2: (!) 94 % (10/17/19 2147) Vital Signs (24h Range):  Temp:  [98.5 °F (36.9 °C)] 98.5 °F (36.9 °C)  Pulse:  [] 72  Resp:  [17-23] 19  SpO2:  [92 %-98 %] 94 %  BP: (116-142)/(55-86) 120/61     Weight: 113.4 kg (250 lb)  Body mass index is 38.01 kg/m².    Physical Exam   Constitutional: He is oriented to person, place, and time. He appears well-developed and well-nourished.   Obese male in NAD   HENT:   Head: Normocephalic and atraumatic.   Eyes: Pupils are equal, round, and reactive to light. Conjunctivae and EOM are normal.   Neck: Normal range of motion. Neck supple.   Cardiovascular: Normal rate, regular rhythm, normal heart sounds and intact distal pulses.   No murmur heard.  No carotid bruit   Pulmonary/Chest: Effort normal. No respiratory distress.   Mild wheeze and diminished lung sounds   Abdominal: Soft. Bowel sounds are normal. He exhibits distension (mild, obese abd). There is no tenderness.   Musculoskeletal: Normal range of motion. He exhibits no edema.   Neurological: He is alert and oriented to person, place, and time.   Skin: Skin is warm and dry.    Psychiatric: He has a normal mood and affect. His behavior is normal. Judgment and thought content normal.   Nursing note and vitals reviewed.        CRANIAL NERVES     CN III, IV, VI   Pupils are equal, round, and reactive to light.  Extraocular motions are normal.        Significant Labs:   CBC:   Recent Labs   Lab 10/17/19  1701   WBC 16.70*   HGB 14.7   HCT 43.8        CMP:   Recent Labs   Lab 10/17/19  1701      K 4.0      CO2 24   GLU 87   BUN 18   CREATININE 1.1   CALCIUM 9.3   PROT 7.5   ALBUMIN 4.5   BILITOT 0.2   ALKPHOS 78   AST 20   ALT 25   ANIONGAP 10   EGFRNONAA >60.0       Significant Imaging: I have reviewed all pertinent imaging results/findings within the past 24 hours.     X-ray Chest Pa And Lateral    Result Date: 10/17/2019  EXAMINATION: XR CHEST PA AND LATERAL CLINICAL HISTORY: Chest Pain; TECHNIQUE: PA and lateral views of the chest were performed. COMPARISON: 06/21/2018 FINDINGS: The cardiomediastinal silhouette is not enlarged..  There is no pleural effusion.  The trachea is midline.  The lungs are symmetrically expanded bilaterally without evidence of acute parenchymal process. No large focal consolidation seen.  There is no pneumothorax.  The osseous structures are unremarkable.     1. No acute cardiopulmonary process.

## 2019-10-18 NOTE — HOSPITAL COURSE
Admitted to hospital medicine for acute chest pain while smoking.  Troponin's negative x 2, with TWI in lead II, AVF.  He had a cardiac work up with an Echo and Nuclear Exercise stress test.  His EF has dropped per Echo from 6/18: Moderately decreased left ventricular systolic function. Ef is down to 38% from 50-55% last year.   Local segmental wall motion abnormalities.  Grade III (severe) left ventricular diastolic dysfunction consistent with restrictive physiology.  Mild left atrial enlargement.  Normal right ventricular systolic function. Mild right atrial enlargement.  Normal central venous pressure (3 mm Hg).  The estimated PA systolic pressure is 19 mm Hg.  Nuclear stress revealed a moderate to large sized, moderate to severe intensity, fixed defect in the mid to apical anteroseptal wall(s) in the typical distribution of the LAD territory.  Gated perfusion images showed an ejection fraction of 40% at rest and 45% post stress. Normal is > 51%. There is  hypokinesis at rest and post-stress of the mid to apical anteroseptal wall. LV cavity size is normal at rest and mildly enlarged at stress. The EKG portion of this study is negative for ischemia.  The patient reported no chest pain during the stress test. Arrhythmias during stress: rare PVCs.  The exercise capacity was mildly impaired.  The blood pressure response to exercise was normal.    His statin was changed to high dose lipitor.  He is pursuing smoking cessation.  His wife states he has poor sleep habits and stops breathing/ cheyne mora at night with daytime somnolence.  He is willing to pursue sleep study so a referral was sent.  His leukocytosis on admit was most likely r/t steroid injection the day prior.     Dispo: home with f/u with Dr. Brice in fellows clinic, amb sleep referral sent.

## 2019-10-18 NOTE — ASSESSMENT & PLAN NOTE
- Stable  - c/w lisinopril 10 mg PO daily  - HR 60's, can discuss low dose BB as outpt if HR more amenable

## 2019-10-18 NOTE — ASSESSMENT & PLAN NOTE
Interested in quitting with about 20 ppy hx  - nicotine patch ordered  - does not use inhalers at home, and no dx of COPD  - PRN dipak

## 2019-10-18 NOTE — PLAN OF CARE
Cm met with patients s/o to obtain discharge planning assessment as patient is off the floor in testing.  Patient admitted with chest pain.  Planned discharge is home with family - Plan (A) and (B).    PCP:  Abad Patterson MD     Payor:  Payor: [employer] / Plan: [employer] CHO[employer] / Product Type: Commercial /      Pharmacy:    [id number] - [place] - [address] AT [facility] [address] [facility]  [address] [place] [address]  Phone: [phone] Fax: [phone]       10/18/19 1131   Discharge Assessment   Assessment Type Discharge Planning Assessment   Confirmed/corrected address and phone number on facesheet? Yes   Assessment information obtained from? Other  (s/o)   Communicated expected length of stay with patient/caregiver yes   Prior to hospitilization cognitive status: Alert/Oriented   Prior to hospitalization functional status: Independent   Current cognitive status: Alert/Oriented   Current Functional Status: Independent   Lives With significant other   Able to Return to Prior Arrangements yes   Is patient able to care for self after discharge? Yes   Who are your caregiver(s) and their phone number(s)? Karli Rajan S/O [phone]   Patient's perception of discharge disposition home or selfcare   Readmission Within the Last 30 Days no previous admission in last 30 days   Patient currently being followed by outpatient case management? No   Patient currently receives any other outside agency services? No   Equipment Currently Used at Home none   Do you have any problems affording any of your prescribed medications? No   Is the patient taking medications as prescribed? yes   Does the patient have transportation home? Yes   Transportation Anticipated family or friend will provide   Does the patient receive services at the Coumadin Clinic? No   Discharge Plan A Home with family   Discharge Plan B Home with family   DME Needed Upon Discharge  none    Patient/Family in Agreement with Plan yes

## 2019-10-18 NOTE — PLAN OF CARE
Patient to be discharged home.  Patient does not have any home needs.  Family to provide transportation home.  CM requested follow up appointment with [facility].  Clinic to schedule appointment.       10/18/19 8286   Final Note   Assessment Type Final Discharge Note   Anticipated Discharge Disposition Home   Hospital Follow Up  Appt(s) scheduled? Yes   Discharge plans and expectations educations in teach back method with documentation complete? Yes

## 2019-10-18 NOTE — ASSESSMENT & PLAN NOTE
Interested in quitting with about 20 ppy hx  - nicotine patch ordered  - does not use inhalers at home, and no dx of COPD  - PRN duonebs  - he is going to quit

## 2019-10-18 NOTE — HPI
"Patient is a 38-year-old white male with a PMH of HTN, HLD, CAD s/p PCI LAD , D1 2013, RCA, OM1 7/18, who presents to the ED for urgent evaluation of chest discomfort.  Patient states he was outside smoking a cigarette when he started to experience a  "numbness" in the center of his chest that radiated to his neck.  This occurred at 3:40 p.m and lasted for under 2 minutes.. He states the sensation spontaneously resolved within minutes no Nitroglycerine was taken. He states the sensation is not similar in quality to his past MI's, though occurred in the same location which prompted his ED visit. He denies exertional chest pain or dyspnea. He denies recent heavy lifting or trauma (he is very active as a physical laborers).   He denies fever/chills, cough, SOB, abdominal pain, N/V/D, dysuria, or focal weakness. He stopped his Plavix couple of days ago (over 1 yr since NSTEMI). He still smokes a pack a day. Bedside TTE EF 40-45%, apical hypokinesis.   "

## 2019-10-18 NOTE — ASSESSMENT & PLAN NOTE
Euvolemic on exam  - TTE 6/22/2018  Shows EF 50, WMAs, normal diastolic fxn.  - not on any diuretics  - c/w ACEI  - EF down to 38% echo 40-45% on Ex Nuc Stress

## 2019-10-18 NOTE — ASSESSMENT & PLAN NOTE
Euvolemic on exam  - TTE 6/22/2018  Shows EF 50, WMAs, normal diastolic fxn.  - not on any diuretics  - c/w ACEI  - avoid BB with smoking hx and possible COPD

## 2019-10-18 NOTE — H&P
"[facility] Hospital Medicine  History & Physical    Patient Name: Cody Castro  MRN: [id number]  Admission Date: 10/17/2019  Attending Physician: Grecia Cevallos MD   Primary Care Provider: Abad Patterson MD    [facility] Medicine Team: [facility] Caio Wyman PA-C     Patient information was obtained from patient, past medical records and ER records.     Subjective:     Principal Problem:Chest pain at rest    Chief Complaint:   Chief Complaint   Patient presents with    Chest Pain     started 10 minutes ago, numbness in chest that radiates to throat. Hx of 2 MI's with 4 stents. Doesnt feel like MI's before.         HPI: Cody Castro is a 38 y.o. M current smoker (25 pack year) with chronic neck/back pain (on norco), anxiety (on clonopin), HTN, HLD, pAfib (not on AC), CAD s/p PCI LAD , D1 2013, RCA, OM1 7/18 who presents to the ED for urgent evaluation of chest discomfort.  Patient states he was outside smoking a cigarette around 3:40 pm when he started to experience a substernal  numbness" which radiated to her neck. He states the sensation spontaneously resolved within 2 minutes and immediately presented to the ED (no tx PTA).  He reports normal amount of cigarette smoking (no changes recently).  He states the sensation is not similar in quality to his past MI's, though occurred in the same location. Patient denies f/c/sweats, LEVI, SOB, orthopnea, dysuria, lower leg swelling.  He denies recent heavy lifting or trauma, but does report working for the [employer] and digs holes regularly.  Of note, NSTEMI with PCI in 7/2018 started on DAPT and reports stopped plavix recently.  He reports family hx of CAD w/ father having MI @ age 45.  He continues to smoke but wants to quit d/t recent birth of grandchild.  He received a hydrocortisone shot yesterday from a chiropracter for his chronic pain.  He does not follow with cardiology (has not since NSTEMI) d/t insurance issues.     ED: Afebrile, initially " tachycardic, HDS.  WBC 16.7. EKG shows sinus tach and inverted T waves in lead II and AVF. Troponin negative x2/BNP 20. CXR unremarkable.  Cardiology consulted in ED and do not suspect ACS. Bedside TTE EF 40-45%, apical hypokinesis.    Past Medical History:   Diagnosis Date    Anxiety     Carbon monoxide poisoning     Caused a seizure    Coronary artery disease     History of heart artery stent 2013    Hypertension     Obesity        Past Surgical History:   Procedure Laterality Date    CORONARY ANGIOPLASTY      LEFT HEART CATHETERIZATION Right 6/25/2018    Procedure: Left heart cath;  Surgeon: Dieter Vargas MD;  Location: [facility] CATH LAB;  Service: Cardiology;  Laterality: Right;       Review of patient's allergies indicates:  No Known Allergies    No current facility-administered medications on file prior to encounter.      Current Outpatient Medications on File Prior to Encounter   Medication Sig    aspirin (ECOTRIN) 81 MG EC tablet Take 1 tablet (81 mg total) by mouth once daily.    clonazepam (KLONOPIN) 1 MG tablet Take 1 mg by mouth 2 (two) times daily as needed.    clopidogrel (PLAVIX) 75 mg tablet Take 1 tablet (75 mg total) by mouth once daily.    HYDROCODONE/ACETAMINOPHEN (NORCO ORAL) Take 7.5 mg by mouth every 6 (six) hours as needed.     lisinopril (PRINIVIL,ZESTRIL) 5 MG tablet Take 10 mg by mouth once daily.    simvastatin (ZOCOR) 40 MG tablet Take 40 mg by mouth every evening.     Family History     Problem Relation (Age of Onset)    Heart disease Mother, Father        Tobacco Use    Smoking status: Current Every Day Smoker     Packs/day: 1.00     Years: 20.00     Pack years: 20.00     Types: Cigarettes     Last attempt to quit: 2/10/2009     Years since quitting: 10.6    Smokeless tobacco: Never Used   Substance and Sexual Activity    Alcohol use: Yes     Comment: occasional. none in 2 months    Drug use: Yes     Types: Marijuana     Comment: occasional marijuana use.last 2 weeks  ago    Sexual activity: Yes     Partners: Female     Review of Systems   Constitutional: Negative for chills, fatigue, fever and unexpected weight change.   HENT: Negative for ear pain and sore throat.    Eyes: Negative for pain and visual disturbance.   Respiratory: Positive for cough (chronic, smoker's cough). Negative for shortness of breath.    Cardiovascular: Positive for chest pain (resolved). Negative for palpitations.   Gastrointestinal: Negative for abdominal pain, diarrhea, nausea and vomiting.   Endocrine: Negative for heat intolerance and polydipsia.   Genitourinary: Negative for dysuria, frequency and urgency.   Musculoskeletal: Positive for back pain (chronic) and neck pain (chronic). Negative for gait problem.   Skin: Negative for rash and wound.   Neurological: Negative for dizziness and weakness.   Psychiatric/Behavioral: Negative for confusion. The patient is not nervous/anxious.      Objective:     Vital Signs (Most Recent):  Temp: 98.5 °F (36.9 °C) (10/17/19 1558)  Pulse: 72 (10/17/19 2147)  Resp: 19 (10/17/19 2147)  BP: 120/61 (10/17/19 2147)  SpO2: (!) 94 % (10/17/19 2147) Vital Signs (24h Range):  Temp:  [98.5 °F (36.9 °C)] 98.5 °F (36.9 °C)  Pulse:  [] 72  Resp:  [17-23] 19  SpO2:  [92 %-98 %] 94 %  BP: (116-142)/(55-86) 120/61     Weight: 113.4 kg (250 lb)  Body mass index is 38.01 kg/m².    Physical Exam   Constitutional: He is oriented to person, place, and time. He appears well-developed and well-nourished.   Obese male in NAD   HENT:   Head: Normocephalic and atraumatic.   Eyes: Pupils are equal, round, and reactive to light. Conjunctivae and EOM are normal.   Neck: Normal range of motion. Neck supple.   Cardiovascular: Normal rate, regular rhythm, normal heart sounds and intact distal pulses.   No murmur heard.  No carotid bruit   Pulmonary/Chest: Effort normal. No respiratory distress.   Mild wheeze and diminished lung sounds   Abdominal: Soft. Bowel sounds are normal. He  exhibits distension (mild, obese abd). There is no tenderness.   Musculoskeletal: Normal range of motion. He exhibits no edema.   Neurological: He is alert and oriented to person, place, and time.   Skin: Skin is warm and dry.   Psychiatric: He has a normal mood and affect. His behavior is normal. Judgment and thought content normal.   Nursing note and vitals reviewed.        CRANIAL NERVES     CN III, IV, VI   Pupils are equal, round, and reactive to light.  Extraocular motions are normal.        Significant Labs:   CBC:   Recent Labs   Lab 10/17/19  1701   WBC 16.70*   HGB 14.7   HCT 43.8        CMP:   Recent Labs   Lab 10/17/19  1701      K 4.0      CO2 24   GLU 87   BUN 18   CREATININE 1.1   CALCIUM 9.3   PROT 7.5   ALBUMIN 4.5   BILITOT 0.2   ALKPHOS 78   AST 20   ALT 25   ANIONGAP 10   EGFRNONAA >60.0       Significant Imaging: I have reviewed all pertinent imaging results/findings within the past 24 hours.     X-ray Chest Pa And Lateral    Result Date: 10/17/2019  EXAMINATION: XR CHEST PA AND LATERAL CLINICAL HISTORY: Chest Pain; TECHNIQUE: PA and lateral views of the chest were performed. COMPARISON: 06/21/2018 FINDINGS: The cardiomediastinal silhouette is not enlarged..  There is no pleural effusion.  The trachea is midline.  The lungs are symmetrically expanded bilaterally without evidence of acute parenchymal process. No large focal consolidation seen.  There is no pneumothorax.  The osseous structures are unremarkable.     1. No acute cardiopulmonary process.     Assessment/Plan:     * Chest pain at rest  39 y/o with HTN, HLD, CAD s/p PCI LAD , D1 2013, RCA, OM1 7/18 who presents with chest discomfort while smoking a cigarette.  Currently CP free and no nitro given. Troponins negative x2, but EKG with TWI to lead II, AVF.     - Cardiology consulted and recs given   - check nuclear stress, NPO MN   - ASA, high dose statin, TTE in AM   - COPD treatment, smoking cessation  - will check  one more troponin  - monitor on tele  - PRN duonebs    Coronary artery disease involving native coronary artery of native heart without angina pectoris  NSTEMI (non-ST elevated myocardial infarction)  Recent NSTEMI 7/2018 with MANJINDER to dRCA, prox Cx and OM1.  Completed DAPT.  - c/w ASA, statin  - f/u cardiology; would like to f/u here    Chronic systolic heart failure  Euvolemic on exam  - TTE 6/22/2018  Shows EF 50, WMAs, normal diastolic fxn.  - not on any diuretics  - c/w ACEI  - avoid BB with smoking hx and possible COPD    HTN (hypertension)  Stable  - c/w lisinopril 10 mg PO daily    Paroxysmal A-fib  No EKG evidence of afib on admission and not on AC  - monitor on tele    Hyperlipidemia  - c/w statin    Cigarette smoker  Interested in quitting with about 20 ppy hx  - nicotine patch ordered  - does not use inhalers at home, and no dx of COPD  - PRN duonebs    Leukocytosis  Steroid-induced  - no signs/sxs of infection    VTE Risk Mitigation (From admission, onward)         Ordered     IP VTE HIGH RISK PATIENT  Once      10/17/19 2112     Place MARIANN hose  Until discontinued      10/17/19 2112     Place sequential compression device  Until discontinued      10/17/19 2112                   Caio Wyman PA-C  Department of Hospital Medicine   [facility]

## 2019-10-18 NOTE — ASSESSMENT & PLAN NOTE
37 y/o with HTN, HLD, CAD s/p PCI LAD , D1 2013, RCA, OM1 7/18 who presents with chest discomfort while smoking a cigarette.  Currently CP free and no nitro given. Troponins negative x2, but EKG with TWI to lead II, AVF.     - Cardiology consulted and recs given   - check nuclear stress, NPO MN   - ASA, high dose statin, TTE in AM   - COPD treatment, smoking cessation  - will check one more troponin  - monitor on tele  - PRN dipak

## 2019-10-18 NOTE — NURSING
Patient lying in bed, AAO, calm and cooperative, unlabored breathing, reviewed and verbalized understanding of the plan of care, Tele in place, right AC IV intact, NPO, skin intact. WCTM

## 2019-10-18 NOTE — SUBJECTIVE & OBJECTIVE
Past Medical History:   Diagnosis Date    Anxiety     Carbon monoxide poisoning     Caused a seizure    Coronary artery disease     History of heart artery stent 2013    Hypertension     Obesity        Past Surgical History:   Procedure Laterality Date    CORONARY ANGIOPLASTY      LEFT HEART CATHETERIZATION Right 6/25/2018    Procedure: Left heart cath;  Surgeon: Dieter Vargas MD;  Location: [facility] CATH LAB;  Service: Cardiology;  Laterality: Right;       Review of patient's allergies indicates:  No Known Allergies    No current facility-administered medications on file prior to encounter.      Current Outpatient Medications on File Prior to Encounter   Medication Sig    aspirin (ECOTRIN) 81 MG EC tablet Take 1 tablet (81 mg total) by mouth once daily.    clonazepam (KLONOPIN) 1 MG tablet Take 1 mg by mouth 2 (two) times daily as needed.    clopidogrel (PLAVIX) 75 mg tablet Take 1 tablet (75 mg total) by mouth once daily.    HYDROCODONE/ACETAMINOPHEN (NORCO ORAL) Take 7.5 mg by mouth every 6 (six) hours as needed.     lisinopril (PRINIVIL,ZESTRIL) 5 MG tablet Take 10 mg by mouth once daily.    simvastatin (ZOCOR) 40 MG tablet Take 40 mg by mouth every evening.     Family History     Problem Relation (Age of Onset)    Heart disease Mother, Father        Tobacco Use    Smoking status: Current Every Day Smoker     Packs/day: 1.00     Years: 20.00     Pack years: 20.00     Types: Cigarettes     Last attempt to quit: 2/10/2009     Years since quitting: 10.6    Smokeless tobacco: Never Used   Substance and Sexual Activity    Alcohol use: Yes     Comment: occasional. none in 2 months    Drug use: Yes     Types: Marijuana     Comment: occasional marijuana use.last 2 weeks ago    Sexual activity: Yes     Partners: Female     Review of Systems   Constitution: Negative for chills, decreased appetite and diaphoresis.   HENT: Negative for congestion and ear discharge.    Eyes: Negative for blurred vision and  discharge.   Cardiovascular: Negative for chest pain, dyspnea on exertion, irregular heartbeat, leg swelling and paroxysmal nocturnal dyspnea.   Respiratory: Negative for cough, hemoptysis and shortness of breath.    Gastrointestinal: Negative for abdominal pain.     Objective:     Vital Signs (Most Recent):  Temp: 98.5 °F (36.9 °C) (10/17/19 1558)  Pulse: 73 (10/17/19 2031)  Resp: 19 (10/17/19 2032)  BP: 122/67 (10/17/19 2031)  SpO2: 96 % (10/17/19 2031) Vital Signs (24h Range):  Temp:  [98.5 °F (36.9 °C)] 98.5 °F (36.9 °C)  Pulse:  [] 73  Resp:  [17-23] 19  SpO2:  [92 %-98 %] 96 %  BP: (116-142)/(55-86) 122/67     Weight: 113.4 kg (250 lb)  Body mass index is 38.01 kg/m².    SpO2: 96 %  O2 Device (Oxygen Therapy): room air    No intake or output data in the 24 hours ending 10/17/19 2144    Lines/Drains/Airways     Peripheral Intravenous Line                 Peripheral IV - Single Lumen 06/25/18 1010 Left Hand 479 days         Peripheral IV - Single Lumen 10/17/19 1701 20 G Right Antecubital less than 1 day                Physical Exam   Constitutional: He is oriented to person, place, and time. He appears well-developed and well-nourished. No distress.   Eyes: Pupils are equal, round, and reactive to light. Conjunctivae are normal.   Neck: No tracheal deviation present. No thyromegaly present.   Cardiovascular: Normal rate, regular rhythm, normal heart sounds and intact distal pulses. Exam reveals no gallop and no friction rub.   No murmur heard.  Pulses:       Radial pulses are 2+ on the right side, and 2+ on the left side.        Femoral pulses are 2+ on the right side, and 2+ on the left side.  Pulmonary/Chest: Effort normal and breath sounds normal. No respiratory distress. He has no wheezes. He has no rales.   Abdominal: Soft. Bowel sounds are normal. He exhibits no distension. There is no tenderness.   Musculoskeletal: He exhibits no edema or deformity.   Neurological: He is alert and oriented to  person, place, and time. No cranial nerve deficit. Coordination normal.   Skin: Skin is warm and dry. He is not diaphoretic.   Psychiatric: He has a normal mood and affect. His behavior is normal.       Significant Labs:   BMP:   Recent Labs   Lab 10/17/19  1701   GLU 87      K 4.0      CO2 24   BUN 18   CREATININE 1.1   CALCIUM 9.3       Significant Imaging: Echocardiogram:   2D echo with color flow doppler:   Results for orders placed or performed during the hospital encounter of 06/21/18   2D echo with color flow doppler   Result Value Ref Range    QEF 50 55 - 65    Diastolic Dysfunction No     Narrative    Date of Procedure: 06/22/2018        TEST DESCRIPTION       Aorta: The aortic root is normal in size, measuring 2.3 cm at sinotubular junction and 2.8 cm at Sinuses of Valsalva. The proximal ascending aorta is normal in size, measuring 2.6 cm across.     Left Atrium: The left atrial volume index is mildly enlarged, measuring 38.94 cc/m2.     Left Ventricle: The left ventricle is upper limit of normal, with an end-diastolic diameter of 5.3 cm, and an end-systolic diameter of 3.9 cm. LV wall thickness is normal, with the septum measuring 0.8 cm and the posterior wall measuring 0.9 cm across.   Relative wall thickness was normal at 0.34, and the LV mass index was 85.2 g/m2 consistent with normal left ventricular mass. The following segments were mildly hypokinetic: mid inferoseptum.  The following segments were moderately hypokinetic: mid anteroseptum, apical septum.  The following segments were severely hypokinetic: basal inferoseptum.  Left ventricular systolic function appears low normal to mildly depressed. Visually estimated ejection fraction is 50-55%. The LV Doppler derived stroke volume equals 60.0 ccs.     Diastolic indices: E wave velocity 1.1 m/s, E/A ratio 2.6,  msec., E/e' ratio(avg) 8. Diastolic function is normal.     Right Atrium: The right atrium is mildly enlarged, measuring  5.4 cm in length and 4.6 cm in width in the apical view.     Right Ventricle: The right ventricle is normal in size measuring 3.3 cm at the base in the apical right ventricle-focused view. Global right ventricular systolic function appears normal. There is abnormal septal motion. Tricuspid annular plane systolic   excursion (TAPSE) is 1.7 cm. Tissue Doppler-derived tricuspid annular peak systolic velocity (S prime) is 11.5 cm/s.     Aortic Valve:  Aortic valve is normal in structure with normal leaflet mobility.     Mitral Valve:  Mitral valve is normal in structure with normal leaflet mobility.     Tricuspid Valve:  Tricuspid valve is normal in structure with normal leaflet mobility.     Pulmonary Valve:  Pulmonary valve is normal in structure with normal leaflet mobility.     IVC: The IVC is not visualized.     Intracavitary: There is no evidence of pericardial effusion, intracavity mass, thrombi, or vegetation.         CONCLUSIONS     1 - Upper limit of normal left ventricular enlargement.     2 - Low normal to mildly depressed left ventricular systolic function (EF 50-55%).     3 - Wall motion abnormalities.     4 - Normal left ventricular diastolic function.     5 - Normal right ventricular systolic function .     6 - Biatrial enlargement.             This document has been electronically    SIGNED BY: Burt Murillo MD On: 06/22/2018 16:08

## 2019-10-18 NOTE — ASSESSMENT & PLAN NOTE
NSTEMI (non-ST elevated myocardial infarction)  Recent NSTEMI 7/2018 with MANJINDER to dRCA, prox Cx and OM1.  Completed DAPT.  - c/w ASA, statin  - f/u cardiology; f/u here with Dr. Brice

## 2019-10-18 NOTE — HPI
"Cody Castro is a 38 y.o. M current smoker (25 pack year) with chronic neck/back pain (on norco), anxiety (on clonopin), HTN, HLD, pAfib (not on AC), CAD s/p PCI LAD , D1 2013, RCA, OM1 7/18 who presents to the ED for urgent evaluation of chest discomfort.  Patient states he was outside smoking a cigarette around 3:40 pm when he started to experience a substernal  numbness" which radiated to her neck. He states the sensation spontaneously resolved within 2 minutes and immediately presented to the ED (no tx PTA).  He reports normal amount of cigarette smoking (no changes recently).  He states the sensation is not similar in quality to his past MI's, though occurred in the same location. Patient denies f/c/sweats, LEVI, SOB, orthopnea, dysuria, lower leg swelling.  He denies recent heavy lifting or trauma, but does report working for the [employer] and [employer] regularly.  Of note, NSTEMI with PCI in 7/2018 started on DAPT and reports stopped plavix recently.  He reports family hx of CAD w/ father having MI @ age 45.  He continues to smoke but wants to quit d/t recent birth of grandchild.  He received a hydrocortisone shot yesterday from a chiropracter for his chronic pain.  He does not follow with cardiology (has not since NSTEMI) d/t insurance issues.     ED: Afebrile, initially tachycardic, HDS.  WBC 16.7. EKG shows sinus tach and inverted T waves in lead II and AVF. Troponin negative x2/BNP 20. CXR unremarkable.  Cardiology consulted in ED and do not suspect ACS. Bedside TTE EF 40-45%, apical hypokinesis.  "

## 2019-10-18 NOTE — PROGRESS NOTES
Per david guevara sonographer. optison given ivp via right arm sl for imaging. Denies transfusion rxn. Tolerated well. Sl flushed before and after with 10 cc ns.

## 2019-10-18 NOTE — ASSESSMENT & PLAN NOTE
37 y/o with HTN, HLD, CAD s/p PCI LAD , D1 2013, RCA, OM1 7/18 who presents with chest discomfort while smoking a cigarette.  Currently CP free and no nitro given. Troponins negative x2, but EKG with TWI to lead II, AVF.     - Cardiology consulted and recs given   - s/p nuclear stress scar with WMA, no new ischemia   - ASA, high dose statin, TTE with drop in ef   - NO BB d/t borderline bertha 60's  - encouraged more of a vegetarian diet, more fish if need meat/ animal products  - smoking cessation  - troponin negative x 2  - monitor on tele  - PRN dipak

## 2019-10-18 NOTE — CONSULTS
"[facility]  Cardiology  Consult Note    Patient Name: Cody Castro  MRN: [id number]  Admission Date: 10/17/2019  Hospital Length of Stay: 0 days  Code Status: Full Code   Attending Provider: Grecia Cevallos MD   Consulting Provider: Leann Brice MD  Primary Care Physician: Abad Patterson MD  Principal Problem:<principal problem not specified>    Patient information was obtained from patient and ER records.     Inpatient consult to Cardiology  Consult performed by: Leann Brice MD  Consult ordered by: Kathleen Kumar PA-C        Subjective:     Chief Complaint:  Chest Pain     HPI:   Patient is a 38-year-old white male with a PMH of HTN, HLD, CAD s/p PCI LAD , D1 2013, RCA, OM1 7/18, who presents to the ED for urgent evaluation of chest discomfort.  Patient states he was outside smoking a cigarette when he started to experience a  "numbness" in the center of his chest that radiated to his neck.  This occurred at 3:40 p.m and lasted for under 2 minutes.. He states the sensation spontaneously resolved within minutes no Nitroglycerine was taken. He states the sensation is not similar in quality to his past MI's, though occurred in the same location which prompted his ED visit. He denies exertional chest pain or dyspnea. He denies recent heavy lifting or trauma (he is very active as a physical laborers).   He denies fever/chills, cough, SOB, abdominal pain, N/V/D, dysuria, or focal weakness.  He stopped his Plavix couple of days ago (over 1 yr since NSTEMI). He still smokes a pack a day. Bedside TTE EF 40-45%, apical hypokinesis.     Past Medical History:   Diagnosis Date    Anxiety     Carbon monoxide poisoning     Caused a seizure    Coronary artery disease     History of heart artery stent 2013    Hypertension     Obesity        Past Surgical History:   Procedure Laterality Date    CORONARY ANGIOPLASTY      LEFT HEART CATHETERIZATION Right 6/25/2018    Procedure: Left heart " cath;  Surgeon: Dieter Vargas MD;  Location: [facility] CATH LAB;  Service: Cardiology;  Laterality: Right;       Review of patient's allergies indicates:  No Known Allergies    No current facility-administered medications on file prior to encounter.      Current Outpatient Medications on File Prior to Encounter   Medication Sig    aspirin (ECOTRIN) 81 MG EC tablet Take 1 tablet (81 mg total) by mouth once daily.    clonazepam (KLONOPIN) 1 MG tablet Take 1 mg by mouth 2 (two) times daily as needed.    clopidogrel (PLAVIX) 75 mg tablet Take 1 tablet (75 mg total) by mouth once daily.    HYDROCODONE/ACETAMINOPHEN (NORCO ORAL) Take 7.5 mg by mouth every 6 (six) hours as needed.     lisinopril (PRINIVIL,ZESTRIL) 5 MG tablet Take 10 mg by mouth once daily.    simvastatin (ZOCOR) 40 MG tablet Take 40 mg by mouth every evening.     Family History     Problem Relation (Age of Onset)    Heart disease Mother, Father        Tobacco Use    Smoking status: Current Every Day Smoker     Packs/day: 1.00     Years: 20.00     Pack years: 20.00     Types: Cigarettes     Last attempt to quit: 2/10/2009     Years since quitting: 10.6    Smokeless tobacco: Never Used   Substance and Sexual Activity    Alcohol use: Yes     Comment: occasional. none in 2 months    Drug use: Yes     Types: Marijuana     Comment: occasional marijuana use.last 2 weeks ago    Sexual activity: Yes     Partners: Female     Review of Systems   Constitution: Negative for chills, decreased appetite and diaphoresis.   HENT: Negative for congestion and ear discharge.    Eyes: Negative for blurred vision and discharge.   Cardiovascular: Negative for chest pain, dyspnea on exertion, irregular heartbeat, leg swelling and paroxysmal nocturnal dyspnea.   Respiratory: Negative for cough, hemoptysis and shortness of breath.    Gastrointestinal: Negative for abdominal pain.     Objective:     Vital Signs (Most Recent):  Temp: 98.5 °F (36.9 °C) (10/17/19  1558)  Pulse: 73 (10/17/19 2031)  Resp: 19 (10/17/19 2032)  BP: 122/67 (10/17/19 2031)  SpO2: 96 % (10/17/19 2031) Vital Signs (24h Range):  Temp:  [98.5 °F (36.9 °C)] 98.5 °F (36.9 °C)  Pulse:  [] 73  Resp:  [17-23] 19  SpO2:  [92 %-98 %] 96 %  BP: (116-142)/(55-86) 122/67     Weight: 113.4 kg (250 lb)  Body mass index is 38.01 kg/m².    SpO2: 96 %  O2 Device (Oxygen Therapy): room air    No intake or output data in the 24 hours ending 10/17/19 2144    Lines/Drains/Airways     Peripheral Intravenous Line                 Peripheral IV - Single Lumen 06/25/18 1010 Left Hand 479 days         Peripheral IV - Single Lumen 10/17/19 1701 20 G Right Antecubital less than 1 day                Physical Exam   Constitutional: He is oriented to person, place, and time. He appears well-developed and well-nourished. No distress.   Eyes: Pupils are equal, round, and reactive to light. Conjunctivae are normal.   Neck: No tracheal deviation present. No thyromegaly present.   Cardiovascular: Normal rate, regular rhythm, normal heart sounds and intact distal pulses. Exam reveals no gallop and no friction rub.   No murmur heard.  Pulses:       Radial pulses are 2+ on the right side, and 2+ on the left side.        Femoral pulses are 2+ on the right side, and 2+ on the left side.  Pulmonary/Chest: Effort normal and breath sounds normal. No respiratory distress. He has no wheezes. He has no rales.   Abdominal: Soft. Bowel sounds are normal. He exhibits no distension. There is no tenderness.   Musculoskeletal: He exhibits no edema or deformity.   Neurological: He is alert and oriented to person, place, and time. No cranial nerve deficit. Coordination normal.   Skin: Skin is warm and dry. He is not diaphoretic.   Psychiatric: He has a normal mood and affect. His behavior is normal.       Significant Labs:   BMP:   Recent Labs   Lab 10/17/19  1701   GLU 87      K 4.0      CO2 24   BUN 18   CREATININE 1.1   CALCIUM 9.3        Significant Imaging: Echocardiogram:   2D echo with color flow doppler:   Results for orders placed or performed during the hospital encounter of 06/21/18   2D echo with color flow doppler   Result Value Ref Range    QEF 50 55 - 65    Diastolic Dysfunction No     Narrative    Date of Procedure: 06/22/2018        TEST DESCRIPTION       Aorta: The aortic root is normal in size, measuring 2.3 cm at sinotubular junction and 2.8 cm at Sinuses of Valsalva. The proximal ascending aorta is normal in size, measuring 2.6 cm across.     Left Atrium: The left atrial volume index is mildly enlarged, measuring 38.94 cc/m2.     Left Ventricle: The left ventricle is upper limit of normal, with an end-diastolic diameter of 5.3 cm, and an end-systolic diameter of 3.9 cm. LV wall thickness is normal, with the septum measuring 0.8 cm and the posterior wall measuring 0.9 cm across.   Relative wall thickness was normal at 0.34, and the LV mass index was 85.2 g/m2 consistent with normal left ventricular mass. The following segments were mildly hypokinetic: mid inferoseptum.  The following segments were moderately hypokinetic: mid anteroseptum, apical septum.  The following segments were severely hypokinetic: basal inferoseptum.  Left ventricular systolic function appears low normal to mildly depressed. Visually estimated ejection fraction is 50-55%. The LV Doppler derived stroke volume equals 60.0 ccs.     Diastolic indices: E wave velocity 1.1 m/s, E/A ratio 2.6,  msec., E/e' ratio(avg) 8. Diastolic function is normal.     Right Atrium: The right atrium is mildly enlarged, measuring 5.4 cm in length and 4.6 cm in width in the apical view.     Right Ventricle: The right ventricle is normal in size measuring 3.3 cm at the base in the apical right ventricle-focused view. Global right ventricular systolic function appears normal. There is abnormal septal motion. Tricuspid annular plane systolic   excursion (TAPSE) is 1.7 cm.  Tissue Doppler-derived tricuspid annular peak systolic velocity (S prime) is 11.5 cm/s.     Aortic Valve:  Aortic valve is normal in structure with normal leaflet mobility.     Mitral Valve:  Mitral valve is normal in structure with normal leaflet mobility.     Tricuspid Valve:  Tricuspid valve is normal in structure with normal leaflet mobility.     Pulmonary Valve:  Pulmonary valve is normal in structure with normal leaflet mobility.     IVC: The IVC is not visualized.     Intracavitary: There is no evidence of pericardial effusion, intracavity mass, thrombi, or vegetation.         CONCLUSIONS     1 - Upper limit of normal left ventricular enlargement.     2 - Low normal to mildly depressed left ventricular systolic function (EF 50-55%).     3 - Wall motion abnormalities.     4 - Normal left ventricular diastolic function.     5 - Normal right ventricular systolic function .     6 - Biatrial enlargement.             This document has been electronically    SIGNED BY: Burt Murillo MD On: 06/22/2018 16:08     Assessment and Plan:     Chest pain at rest  - Patient seen and examined  - Not quite convincing for ACS, timing and nature of the chest pain especially given he is able to exercise at work without symptoms  - Recommend Nuclear stress test in AM, NP at midnight  - Recommend smoking cessation  - Recommend ASA, high dose statin (switch to crestor or lipitor)  - Recommend treatments for COPD - very tight on breathing  - Recommend TTE in AM        VTE Risk Mitigation (From admission, onward)         Ordered     IP VTE HIGH RISK PATIENT  Once      10/17/19 2112     Place MARIANN hose  Until discontinued      10/17/19 2112     Place sequential compression device  Until discontinued      10/17/19 2112                Thank you for your consult. I will follow-up with patient. Please contact us if you have any additional questions.    Leann Brice MD  Cardiology   [facility]

## 2019-10-18 NOTE — DISCHARGE SUMMARY
"[facility] Hospital Medicine  Discharge Summary      Patient Name: Cody Castro  MRN: [id number]  Admission Date: 10/17/2019  Hospital Length of Stay: 0 days  Discharge Date and Time:  10/18/2019 5:08 PM  Attending Physician: Grecia Cevallos MD   Discharging Provider: Molly Segovia NP  Primary Care Provider: Abad Patterson MD  [facility] Medicine Team: [facility]  Molly Segovia NP    HPI:   Cody Castro is a 38 y.o. M current smoker (25 pack year) with chronic neck/back pain (on norco), anxiety (on clonopin), HTN, HLD, pAfib (not on AC), CAD s/p PCI LAD , D1 2013, RCA, OM1 7/18 who presents to the ED for urgent evaluation of chest discomfort.  Patient states he was outside smoking a cigarette around 3:40 pm when he started to experience a substernal  numbness" which radiated to her neck. He states the sensation spontaneously resolved within 2 minutes and immediately presented to the ED (no tx PTA).  He reports normal amount of cigarette smoking (no changes recently).  He states the sensation is not similar in quality to his past MI's, though occurred in the same location. Patient denies f/c/sweats, LEVI, SOB, orthopnea, dysuria, lower leg swelling.  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  Of note, NSTEMI with PCI in 7/2018 started on DAPT and reports stopped plavix recently.  He reports family hx of CAD w/ father having MI @ age 45.  He continues to smoke but wants to quit d/t recent birth of grandchild.  He received a hydrocortisone shot yesterday from a chiropracter for his chronic pain.  He does not follow with cardiology (has not since NSTEMI) d/t insurance issues.     ED: Afebrile, initially tachycardic, HDS.  WBC 16.7. EKG shows sinus tach and inverted T waves in lead II and AVF. Troponin negative x2/BNP 20. CXR unremarkable.  Cardiology consulted in ED and do not suspect ACS. Bedside TTE EF 40-45%, apical " hypokinesis.    * No surgery found *      Hospital Course:   Admitted to hospital medicine for acute chest pain while smoking.  Troponin's negative x 2, with TWI in lead II, AVF.  He had a cardiac work up with an Echo and Nuclear Exercise stress test.  His EF has dropped per Echo from 6/18: Moderately decreased left ventricular systolic function. Ef is down to 38% from 50-55% last year.   Local segmental wall motion abnormalities.  Grade III (severe) left ventricular diastolic dysfunction consistent with restrictive physiology.  Mild left atrial enlargement.  Normal right ventricular systolic function. Mild right atrial enlargement.  Normal central venous pressure (3 mm Hg).  The estimated PA systolic pressure is 19 mm Hg.  Nuclear stress revealed a moderate to large sized, moderate to severe intensity, fixed defect in the mid to apical anteroseptal wall(s) in the typical distribution of the LAD territory.  Gated perfusion images showed an ejection fraction of 40% at rest and 45% post stress. Normal is > 51%. There is  hypokinesis at rest and post-stress of the mid to apical anteroseptal wall. LV cavity size is normal at rest and mildly enlarged at stress. The EKG portion of this study is negative for ischemia.  The patient reported no chest pain during the stress test. Arrhythmias during stress: rare PVCs.  The exercise capacity was mildly impaired.  The blood pressure response to exercise was normal.    His statin was changed to high dose lipitor.  He is pursuing smoking cessation.  His wife states he has poor sleep habits and stops breathing/ cheyne mora at night with daytime somnolence.  He is willing to pursue sleep study so a referral was sent.  His leukocytosis on admit was most likely r/t steroid injection the day prior.     Dispo: home with f/u with Dr. Brice in fellows clinic, amb sleep referral sent.              Consults:   Consults (From admission, onward)        Status Ordering Provider      Inpatient consult to Cardiology  Once     Provider:  (Not yet assigned)    Completed GÓMEZ HERNANDEZ      Review of Systems   Constitutional: Negative for chills, fatigue, fever and unexpected weight change.   HENT: Negative for ear pain and sore throat.    Eyes: Negative for pain and visual disturbance.   Respiratory: Positive for cough (chronic, smoker's cough). Negative for shortness of breath.    Cardiovascular: Positive for chest pain (resolved). Negative for palpitations.   Gastrointestinal: Negative for abdominal pain, diarrhea, nausea and vomiting.   Endocrine: Negative for heat intolerance and polydipsia.   Genitourinary: Negative for dysuria, frequency and urgency.   Musculoskeletal: Positive for back pain (chronic) and neck pain (chronic). Negative for gait problem.   Skin: Negative for rash and wound.   Neurological: Negative for dizziness and weakness.   Psychiatric/Behavioral: Negative for confusion. The patient is not nervous/anxious.      Physical Exam   Constitutional: He is oriented to person, place, and time. He appears well-developed and well-nourished. Obese male in NAD   HENT:   Head: Normocephalic and atraumatic.   Neck: Normal range of motion. Neck supple.   Cardiovascular: Normal rate, regular rhythm, normal heart sounds and intact distal pulses. No murmur heard. No carotid bruit   Pulmonary/Chest: Effort normal. No respiratory distress. Clear lung sounds.   Abdominal: Soft. Bowel sounds are normal. He exhibits distension (protuberant). There is no tenderness.   Musculoskeletal: Normal range of motion. He exhibits no edema.   Neurological: He is alert and oriented to person, place, and time.   Skin: Skin is warm and dry.   Psychiatric: He has a normal mood and affect. His behavior is normal. Judgment and thought content normal.     * Chest pain at rest  37 y/o with HTN, HLD, CAD s/p PCI LAD , D1 2013, RCA, OM1 7/18 who presents with chest discomfort while smoking a cigarette.  Currently CP  free and no nitro given. Troponins negative x2, but EKG with TWI to lead II, AVF.     - Cardiology consulted and recs given   - s/p nuclear stress scar with WMA, no new ischemia   - ASA, high dose statin, TTE with drop in ef   - NO BB d/t borderline bertha 60's  - encouraged more of a vegetarian diet, more fish if need meat/ animal products  - smoking cessation  - troponin negative x 2  - monitor on tele  - PRN duonebs    Leukocytosis  Steroid-induced  - no signs/sxs of infection    Coronary artery disease involving native coronary artery of native heart without angina pectoris  NSTEMI (non-ST elevated myocardial infarction)  Recent NSTEMI 7/2018 with MANJINDER to dRCA, prox Cx and OM1.  Completed DAPT.  - c/w ASA, statin  - f/u cardiology; f/u here with Dr. Brice    Chronic diastolic heart failure  Euvolemic on exam  - TTE 6/22/2018  Shows EF 50, WMAs, normal diastolic fxn.  - not on any diuretics  - c/w ACEI  - EF down to 38% echo 40-45% on Ex Nuc Stress    Paroxysmal A-fib  No EKG evidence of afib on admission and not on AC  - monitor on tele    Cigarette smoker  Interested in quitting with about 20 ppy hx  - nicotine patch ordered  - does not use inhalers at home, and no dx of COPD  - PRN duonebs  - he is going to quit    Hyperlipidemia  - changed to high intensity statin lipitor 80    HTN (hypertension)  - Stable  - c/w lisinopril 10 mg PO daily  - HR 60's, can discuss low dose BB as outpt if HR more amenable    Chest pain-resolved as of 10/17/2019          Final Active Diagnoses:    Diagnosis Date Noted POA    PRINCIPAL PROBLEM:  Chest pain at rest [R07.9] 09/03/2014 Yes    Leukocytosis [D72.829] 10/17/2019 Yes    Coronary artery disease involving native coronary artery of native heart without angina pectoris [I25.10] 06/22/2018 Yes    Chronic diastolic heart failure [I50.32] 06/22/2018 Yes    Paroxysmal A-fib [I48.0] 02/09/2013 Yes    HTN (hypertension) [I10] 02/09/2013 Yes     Chronic    Hyperlipidemia  [E78.5] 02/09/2013 Yes     Chronic    Cigarette smoker [F17.210] 02/09/2013 Yes     Chronic      Problems Resolved During this Admission:    Diagnosis Date Noted Date Resolved POA    Chest pain [R07.9] 10/17/2019 10/17/2019 Yes       Discharged Condition: good    Disposition: Home or Self Care    Follow Up:  Follow-up Information     Leann Brice MD In 2 weeks.    Specialty:  Cardiovascular Disease  Why:  Post hospital stay  Contact information:  [address]  [facility] [id number]  [phone]                 Patient Instructions:      Ambulatory referral to Cardiology   Referral Priority: Routine Referral Type: Consultation   Referral Reason: Specialty Services Required   Requested Specialty: Cardiology   Number of Visits Requested: 1     Ambulatory referral to Sleep Disorders   Referral Priority: Routine Referral Type: Consultation   Requested Specialty: Sleep Medicine   Number of Visits Requested: 1     Diet Cardiac     Notify your health care provider if you experience any of the following:  temperature >100.4     Notify your health care provider if you experience any of the following:  persistent nausea and vomiting or diarrhea     Notify your health care provider if you experience any of the following:  severe uncontrolled pain     Notify your health care provider if you experience any of the following:  redness, tenderness, or signs of infection (pain, swelling, redness, odor or green/yellow discharge around incision site)     Notify your health care provider if you experience any of the following:  difficulty breathing or increased cough     Notify your health care provider if you experience any of the following:  severe persistent headache     Notify your health care provider if you experience any of the following:  worsening rash     Notify your health care provider if you experience any of the following:  persistent dizziness, light-headedness, or visual disturbances     Notify your  health care provider if you experience any of the following:  increased confusion or weakness     Activity as tolerated       Significant Diagnostic Studies: Labs: All labs within the past 24 hours have been reviewed    Pending Diagnostic Studies:     Procedure Component Value Units Date/Time    NM Myocardial Perfusion Spect Multi Exer [[id number]]     Order Status:  Sent Lab Status:  No result          Medications:  Reconciled Home Medications:      Medication List      START taking these medications    atorvastatin 80 MG tablet  Commonly known as:  LIPITOR  Take 1 tablet (80 mg total) by mouth every evening.        CHANGE how you take these medications    lisinopril 10 MG tablet  Take 1 tablet (10 mg total) by mouth once daily.  What changed:  medication strength        CONTINUE taking these medications    aspirin 81 MG EC tablet  Commonly known as:  ECOTRIN  Take 1 tablet (81 mg total) by mouth once daily.     clonazePAM 1 MG tablet  Commonly known as:  KLONOPIN  Take 1 mg by mouth 2 (two) times daily as needed.     clopidogrel 75 mg tablet  Commonly known as:  PLAVIX  Take 1 tablet (75 mg total) by mouth once daily.     NORCO ORAL  Take 7.5 mg by mouth every 6 (six) hours as needed.        STOP taking these medications    simvastatin 40 MG tablet  Commonly known as:  ZOCOR            Indwelling Lines/Drains at time of discharge:   Lines/Drains/Airways     None                 Time spent on the discharge of patient: 45 minutes  Patient was seen and examined on the date of discharge and determined to be suitable for discharge.         Molly Segovia NP  Department of Hospital Medicine  [facility]  Spectra:  [phone]  Pager: [phone]

## 2019-10-18 NOTE — PLAN OF CARE
Oriented to room. Plan of care discussed with patient. Patient is free of fall/trauma/injury. Denies CP, SOB, or pain/discomfort. Monitor showing SR. Uneventful night shift. All questions addressed. Will continue to monitor

## 2019-10-18 NOTE — PLAN OF CARE
10/18/19 0941   Post-Acute Status   Post-Acute Authorization Other   Other Status No Post-Acute Service Needs       SW following for DC needs. SW is in communication with CM, and patient's care team- IMJ. SW will continue to follow.       Yomaira Penny, ELIEL  [facility]  Ext. [phone]     yes

## 2019-10-18 NOTE — NURSING
Patient discharged in stable condition, with their belongings, IV removed. Discharged instructions given

## 2019-11-15 ENCOUNTER — OFFICE VISIT (OUTPATIENT)
Dept: CARDIOLOGY | Facility: CLINIC | Age: 39
End: 2019-11-15
Payer: COMMERCIAL

## 2019-11-15 ENCOUNTER — LAB VISIT (OUTPATIENT)
Dept: LAB | Facility: HOSPITAL | Age: 39
End: 2019-11-15
Payer: COMMERCIAL

## 2019-11-15 VITALS
WEIGHT: 236.56 LBS | HEIGHT: 67 IN | SYSTOLIC BLOOD PRESSURE: 115 MMHG | BODY MASS INDEX: 37.13 KG/M2 | DIASTOLIC BLOOD PRESSURE: 61 MMHG | HEART RATE: 86 BPM

## 2019-11-15 DIAGNOSIS — I25.10 CORONARY ARTERY DISEASE INVOLVING NATIVE CORONARY ARTERY OF NATIVE HEART WITHOUT ANGINA PECTORIS: ICD-10-CM

## 2019-11-15 DIAGNOSIS — E78.2 MIXED HYPERLIPIDEMIA: Chronic | ICD-10-CM

## 2019-11-15 DIAGNOSIS — I50.9 CONGESTIVE HEART FAILURE, NYHA CLASS 1, UNSPECIFIED CONGESTIVE HEART FAILURE TYPE: ICD-10-CM

## 2019-11-15 DIAGNOSIS — I10 ESSENTIAL HYPERTENSION: Chronic | ICD-10-CM

## 2019-11-15 DIAGNOSIS — F17.210 CIGARETTE SMOKER: Chronic | ICD-10-CM

## 2019-11-15 DIAGNOSIS — I50.22 CHRONIC SYSTOLIC CONGESTIVE HEART FAILURE: ICD-10-CM

## 2019-11-15 DIAGNOSIS — I50.9 CONGESTIVE HEART FAILURE, NYHA CLASS 1, UNSPECIFIED CONGESTIVE HEART FAILURE TYPE: Primary | ICD-10-CM

## 2019-11-15 LAB
ALBUMIN SERPL BCP-MCNC: 4.4 G/DL (ref 3.5–5.2)
ALP SERPL-CCNC: 91 U/L (ref 55–135)
ALT SERPL W/O P-5'-P-CCNC: 32 U/L (ref 10–44)
ANION GAP SERPL CALC-SCNC: 9 MMOL/L (ref 8–16)
AST SERPL-CCNC: 22 U/L (ref 10–40)
BILIRUB SERPL-MCNC: 0.4 MG/DL (ref 0.1–1)
BUN SERPL-MCNC: 15 MG/DL (ref 6–20)
CALCIUM SERPL-MCNC: 9.6 MG/DL (ref 8.7–10.5)
CHLORIDE SERPL-SCNC: 106 MMOL/L (ref 95–110)
CHOLEST SERPL-MCNC: 150 MG/DL (ref 120–199)
CHOLEST/HDLC SERPL: 6.3 {RATIO} (ref 2–5)
CO2 SERPL-SCNC: 24 MMOL/L (ref 23–29)
CREAT SERPL-MCNC: 1 MG/DL (ref 0.5–1.4)
EST. GFR  (AFRICAN AMERICAN): >60 ML/MIN/1.73 M^2
EST. GFR  (NON AFRICAN AMERICAN): >60 ML/MIN/1.73 M^2
GLUCOSE SERPL-MCNC: 107 MG/DL (ref 70–110)
HDLC SERPL-MCNC: 24 MG/DL (ref 40–75)
HDLC SERPL: 16 % (ref 20–50)
LDLC SERPL CALC-MCNC: 94 MG/DL (ref 63–159)
NONHDLC SERPL-MCNC: 126 MG/DL
POTASSIUM SERPL-SCNC: 4.4 MMOL/L (ref 3.5–5.1)
PROT SERPL-MCNC: 7.6 G/DL (ref 6–8.4)
SODIUM SERPL-SCNC: 139 MMOL/L (ref 136–145)
TRIGL SERPL-MCNC: 160 MG/DL (ref 30–150)

## 2019-11-15 PROCEDURE — 36415 COLL VENOUS BLD VENIPUNCTURE: CPT

## 2019-11-15 PROCEDURE — 3008F PR BODY MASS INDEX (BMI) DOCUMENTED: ICD-10-PCS | Mod: CPTII,S$GLB,, | Performed by: STUDENT IN AN ORGANIZED HEALTH CARE EDUCATION/TRAINING PROGRAM

## 2019-11-15 PROCEDURE — 99999 PR PBB SHADOW E&M-EST. PATIENT-LVL III: CPT | Mod: PBBFAC,,, | Performed by: STUDENT IN AN ORGANIZED HEALTH CARE EDUCATION/TRAINING PROGRAM

## 2019-11-15 PROCEDURE — 3008F BODY MASS INDEX DOCD: CPT | Mod: CPTII,S$GLB,, | Performed by: STUDENT IN AN ORGANIZED HEALTH CARE EDUCATION/TRAINING PROGRAM

## 2019-11-15 PROCEDURE — 3078F DIAST BP <80 MM HG: CPT | Mod: CPTII,S$GLB,, | Performed by: STUDENT IN AN ORGANIZED HEALTH CARE EDUCATION/TRAINING PROGRAM

## 2019-11-15 PROCEDURE — 99999 PR PBB SHADOW E&M-EST. PATIENT-LVL III: ICD-10-PCS | Mod: PBBFAC,,, | Performed by: STUDENT IN AN ORGANIZED HEALTH CARE EDUCATION/TRAINING PROGRAM

## 2019-11-15 PROCEDURE — 3074F SYST BP LT 130 MM HG: CPT | Mod: CPTII,S$GLB,, | Performed by: STUDENT IN AN ORGANIZED HEALTH CARE EDUCATION/TRAINING PROGRAM

## 2019-11-15 PROCEDURE — 80061 LIPID PANEL: CPT

## 2019-11-15 PROCEDURE — 3074F PR MOST RECENT SYSTOLIC BLOOD PRESSURE < 130 MM HG: ICD-10-PCS | Mod: CPTII,S$GLB,, | Performed by: STUDENT IN AN ORGANIZED HEALTH CARE EDUCATION/TRAINING PROGRAM

## 2019-11-15 PROCEDURE — 99214 PR OFFICE/OUTPT VISIT, EST, LEVL IV, 30-39 MIN: ICD-10-PCS | Mod: S$GLB,,, | Performed by: STUDENT IN AN ORGANIZED HEALTH CARE EDUCATION/TRAINING PROGRAM

## 2019-11-15 PROCEDURE — 80053 COMPREHEN METABOLIC PANEL: CPT

## 2019-11-15 PROCEDURE — 99214 OFFICE O/P EST MOD 30 MIN: CPT | Mod: S$GLB,,, | Performed by: STUDENT IN AN ORGANIZED HEALTH CARE EDUCATION/TRAINING PROGRAM

## 2019-11-15 PROCEDURE — 3078F PR MOST RECENT DIASTOLIC BLOOD PRESSURE < 80 MM HG: ICD-10-PCS | Mod: CPTII,S$GLB,, | Performed by: STUDENT IN AN ORGANIZED HEALTH CARE EDUCATION/TRAINING PROGRAM

## 2019-11-15 RX ORDER — METOPROLOL SUCCINATE 25 MG/1
25 TABLET, EXTENDED RELEASE ORAL DAILY
Qty: 30 TABLET | Refills: 11 | Status: SHIPPED | OUTPATIENT
Start: 2019-11-15 | End: 2020-02-19

## 2019-11-15 RX ORDER — HYDROCODONE BITARTRATE AND ACETAMINOPHEN 7.5; 325 MG/1; MG/1
TABLET ORAL
Refills: 0 | COMMUNITY
Start: 2019-10-21 | End: 2022-03-18 | Stop reason: SDUPTHER

## 2019-11-15 NOTE — PROGRESS NOTES
Subjective:    Patient ID:  Cody Castro is a 39 y.o. male who presents for follow-up of Chest Pain and Essential hypertension      HPI  40 yo M with PMH CAD s/p LAD D1 PCI 2013, RCA OM1 PCI 2018, ICM 38%, HTN, HLD, Recently quit smoking, presenting to follow up since he was discharged 7/19 for chest pain. At that time he was found to have -ve troponin and EKG but reduced EF from 55 to 38%, and Mod to large fixed defect in ateroseptal wall on SPECT stress.     Review of Systems   Constitution: Negative for chills, decreased appetite and diaphoresis.   HENT: Negative for congestion and ear discharge.    Eyes: Negative for blurred vision and discharge.   Cardiovascular: Negative for chest pain, dyspnea on exertion, irregular heartbeat, leg swelling and paroxysmal nocturnal dyspnea.   Respiratory: Negative for cough, hemoptysis and shortness of breath.    Gastrointestinal: Negative for abdominal pain.        Objective:    Physical Exam   Constitutional: He is oriented to person, place, and time. He appears well-developed and well-nourished. No distress.   Eyes: Pupils are equal, round, and reactive to light. Conjunctivae are normal.   Neck: No tracheal deviation present. No thyromegaly present.   Cardiovascular: Normal rate, regular rhythm, normal heart sounds and intact distal pulses. Exam reveals no gallop and no friction rub.   No murmur heard.  Pulses:       Radial pulses are 2+ on the right side, and 2+ on the left side.        Femoral pulses are 2+ on the right side, and 2+ on the left side.  Pulmonary/Chest: Effort normal and breath sounds normal. No respiratory distress. He has no wheezes. He has no rales.   Abdominal: Soft. Bowel sounds are normal. He exhibits no distension. There is no tenderness.   Musculoskeletal: He exhibits no edema or deformity.   Neurological: He is alert and oriented to person, place, and time. No cranial nerve deficit. Coordination normal.   Skin: Skin is warm and dry. He is not  diaphoretic.   Psychiatric: He has a normal mood and affect. His behavior is normal.         Assessment:       1. Congestive heart failure, NYHA class 1, unspecified congestive heart failure type    2. Chronic systolic congestive heart failure    3. Cigarette smoker    4. Coronary artery disease involving native coronary artery of native heart without angina pectoris    5. Mixed hyperlipidemia    6. Essential hypertension         Plan:       Chronic systolic congestive heart failure  - Last EF  7/19 showed 38% EF likely from past MI in LAD territory  - Nuc SPECT with no ischemia (likely scar)  - Start Metop Succinate 25 QD and uptitrate  - Continue Lisinopril 10  - Repeat TTE in 3 months    Cigarette smoker  - Basically quit, smoked 3 cigs in 3 months!  - Congratulated him    Coronary artery disease involving native coronary artery of native heart without angina pectoris  - No angina  - ASA, Lipitor 80  - Check lipid panel today    Hyperlipidemia  HDL 24   - Check Lipids today since starting Lipitor  - Check CMP to make sure liver ok    HTN (hypertension)  - Lisinopril 10 mg  - BP good today

## 2019-11-15 NOTE — ASSESSMENT & PLAN NOTE
- Last EF  7/19 showed 38% EF likely from past MI in LAD territory  - Nuc SPECT with no ischemia (likely scar)  - Start Metop Succinate 25 QD and uptitrate  - Continue Lisinopril 10  - Repeat TTE in 3 months

## 2019-11-18 ENCOUNTER — TELEPHONE (OUTPATIENT)
Dept: CARDIOLOGY | Facility: HOSPITAL | Age: 39
End: 2019-11-18

## 2020-02-19 ENCOUNTER — OFFICE VISIT (OUTPATIENT)
Dept: CARDIOLOGY | Facility: CLINIC | Age: 40
End: 2020-02-19
Payer: COMMERCIAL

## 2020-02-19 VITALS
HEART RATE: 78 BPM | WEIGHT: 235.25 LBS | DIASTOLIC BLOOD PRESSURE: 71 MMHG | BODY MASS INDEX: 37.81 KG/M2 | SYSTOLIC BLOOD PRESSURE: 133 MMHG | HEIGHT: 66 IN

## 2020-02-19 DIAGNOSIS — I48.0 PAROXYSMAL A-FIB: ICD-10-CM

## 2020-02-19 DIAGNOSIS — E78.2 MIXED HYPERLIPIDEMIA: Chronic | ICD-10-CM

## 2020-02-19 DIAGNOSIS — I25.10 CORONARY ARTERY DISEASE INVOLVING NATIVE CORONARY ARTERY OF NATIVE HEART WITHOUT ANGINA PECTORIS: Primary | ICD-10-CM

## 2020-02-19 DIAGNOSIS — I10 ESSENTIAL HYPERTENSION: Chronic | ICD-10-CM

## 2020-02-19 DIAGNOSIS — I50.22 CHRONIC SYSTOLIC CONGESTIVE HEART FAILURE: ICD-10-CM

## 2020-02-19 PROCEDURE — 3008F BODY MASS INDEX DOCD: CPT | Mod: CPTII,S$GLB,,

## 2020-02-19 PROCEDURE — 99999 PR PBB SHADOW E&M-EST. PATIENT-LVL III: CPT | Mod: PBBFAC,,,

## 2020-02-19 PROCEDURE — 99214 PR OFFICE/OUTPT VISIT, EST, LEVL IV, 30-39 MIN: ICD-10-PCS | Mod: S$GLB,,,

## 2020-02-19 PROCEDURE — 3075F PR MOST RECENT SYSTOLIC BLOOD PRESS GE 130-139MM HG: ICD-10-PCS | Mod: CPTII,S$GLB,,

## 2020-02-19 PROCEDURE — 99999 PR PBB SHADOW E&M-EST. PATIENT-LVL III: ICD-10-PCS | Mod: PBBFAC,,,

## 2020-02-19 PROCEDURE — 3008F PR BODY MASS INDEX (BMI) DOCUMENTED: ICD-10-PCS | Mod: CPTII,S$GLB,,

## 2020-02-19 PROCEDURE — 3078F PR MOST RECENT DIASTOLIC BLOOD PRESSURE < 80 MM HG: ICD-10-PCS | Mod: CPTII,S$GLB,,

## 2020-02-19 PROCEDURE — 3078F DIAST BP <80 MM HG: CPT | Mod: CPTII,S$GLB,,

## 2020-02-19 PROCEDURE — 3075F SYST BP GE 130 - 139MM HG: CPT | Mod: CPTII,S$GLB,,

## 2020-02-19 PROCEDURE — 99214 OFFICE O/P EST MOD 30 MIN: CPT | Mod: S$GLB,,,

## 2020-02-19 RX ORDER — METOPROLOL SUCCINATE 25 MG/1
50 TABLET, EXTENDED RELEASE ORAL DAILY
Qty: 60 TABLET | Refills: 11 | Status: SHIPPED | OUTPATIENT
Start: 2020-02-19 | End: 2020-02-19

## 2020-02-19 RX ORDER — METOPROLOL SUCCINATE 50 MG/1
50 TABLET, EXTENDED RELEASE ORAL DAILY
Qty: 30 TABLET | Refills: 11 | Status: SHIPPED | OUTPATIENT
Start: 2020-02-19 | End: 2021-02-23 | Stop reason: SDUPTHER

## 2020-02-19 NOTE — PROGRESS NOTES
Cardiology Clinic Note  Reason for Visit: Follow-up for CAD and associated LV dysfunction    HPI:   We are seeing this 38yo man with early CAD related to tobacco use for follow-up echo.    He has a 25 pack year history with associated CAD s/p PCI (LAD , D1 2013  RCA, OM1 7/18), HTN, HLD, pAfib (not on AC). He was hospitalized for chest pain 07/19, had a stress which showed old infarct, no new ischemia, echo with EF that had dropped to 38%. He was discharged on GDMT and saw Dr. Brice in the clinic.    Since his last visit he has felt very well, he no longer eats red meats, eats low carb diet. He has lost 30lbs since he started dieting. He exercises 15min per day on an exercise bike, no symptoms, and has increased work capacity. He has been compliant with his medications, he has stopped smoking tobacco for the last 35 days.    ROS:    Constitution: Negative for fever, chills, weight loss or gain.   HENT: Negative for sore throat, rhinorrhea, or headache.  Eyes: Negative for blurred or double vision.   Cardiovascular: See above  Pulmonary: Negative for SOB   Gastrointestinal: Negative for abdominal pain, nausea, vomiting, or diarrhea.   : Negative for dysuria.   Neurological: Negative for focal weakness or sensory changes.  PMH:     Past Medical History:   Diagnosis Date    Anxiety     Carbon monoxide poisoning     Caused a seizure    Chronic systolic congestive heart failure 11/15/2019    Coronary artery disease     History of heart artery stent 2013    Hypertension     Obesity      Past Surgical History:   Procedure Laterality Date    CORONARY ANGIOPLASTY      LEFT HEART CATHETERIZATION Right 6/25/2018    Procedure: Left heart cath;  Surgeon: Dieter Vargas MD;  Location: [facility] CATH LAB;  Service: Cardiology;  Laterality: Right;     Allergies:   Review of patient's allergies indicates:  No Known Allergies  Medications:     Current Outpatient Medications on File Prior to Visit   Medication Sig  "Dispense Refill    aspirin (ECOTRIN) 81 MG EC tablet Take 1 tablet (81 mg total) by mouth once daily.  0    atorvastatin (LIPITOR) 80 MG tablet Take 1 tablet (80 mg total) by mouth every evening. 90 tablet 3    clonazepam (KLONOPIN) 1 MG tablet Take 1 mg by mouth 2 (two) times daily as needed.      HYDROcodone-acetaminophen (NORCO) 7.5-325 mg per tablet TK 1 T PO  Q  6  H  PRN P  0    lisinopril 10 MG tablet Take 1 tablet (10 mg total) by mouth once daily. 90 tablet 17    metoprolol succinate (TOPROL-XL) 25 MG 24 hr tablet Take 1 tablet (25 mg total) by mouth once daily. 30 tablet 11     No current facility-administered medications on file prior to visit.      Social History:     Social History     Tobacco Use    Smoking status: Former Smoker     Packs/day: 1.00     Years: 20.00     Pack years: 20.00     Types: Cigarettes     Last attempt to quit: 2019     Years since quittin.2    Smokeless tobacco: Never Used   Substance Use Topics    Alcohol use: Not Currently     Comment: occasional. none in 2 months     Family History:     Family History   Problem Relation Age of Onset    Heart disease Mother     Heart disease Father      Physical Exam:   /71 (BP Location: Left arm, Patient Position: Sitting, BP Method: X-Large (Automatic))   Pulse 78   Ht 5' 6" (1.676 m)   Wt 106.7 kg (235 lb 3.7 oz)   BMI 37.97 kg/m²      Constitutional: No distress, obese, conversant  HEENT: Sclera anicteric, PERRLA, EOMI  Neck: No JVD, no masses, good movement  CV: RRR, S1 and S2 normal, no additional heart sounds or murmurs. Pulses 2+ and equal bilaterally in radial arteries.  Pulm: Clear to auscultation bilaterally with symmetrical expansion.  GI: Abdomen soft, non-tender, good bowel sounds  Extremities: Both extremities intact and grossly normal, skin is warm, no edema noted  Skin: No ecchymosis, erythema, or ulcers  Psych: AOx3, appropriate affect  Neuro: CNII-XII intact, no focal deficits      Labs:     Lab " Results   Component Value Date     11/15/2019    K 4.4 11/15/2019     11/15/2019    CO2 24 11/15/2019    BUN 15 11/15/2019    CREATININE 1.0 11/15/2019    ANIONGAP 9 11/15/2019     Lab Results   Component Value Date    HGBA1C 5.1 06/22/2018     Lab Results   Component Value Date    BNP 20 10/17/2019    BNP <10 06/21/2018    BNP 39 09/03/2014    Lab Results   Component Value Date    WBC 10.95 10/18/2019    HGB 13.1 (L) 10/18/2019    HCT 41.4 10/18/2019    HCT 46 09/23/2016     10/18/2019    GRAN 6.4 10/18/2019    GRAN 58.5 10/18/2019     Lab Results   Component Value Date    CHOL 150 11/15/2019    HDL 24 (L) 11/15/2019    LDLCALC 94.0 11/15/2019    TRIG 160 (H) 11/15/2019          Imaging:   SPECT: 10/2019    There is a moderate to large sized, moderate to severe intensity, fixed defect in the mid to apical anteroseptal wall(s) in the typical distribution of the LAD territory.    Gated perfusion images showed an ejection fraction of 40.0 % at rest and 45 % post stress. Normal is > 51%.    There is  hypokinesis at rest and post-stress of the mid to apical anteroseptal wall.    LV cavity size is normal at rest and mildly enlarged at stress.    The EKG portion of this study is negative for ischemia.    The patient reported no chest pain during the stress test.    Arrhythmias during stress: rare PVCs.    The exercise capacity was mildly impaired.    The blood pressure response to exercise was normal.     Nuc Stress EF   Date Value Ref Range Status   10/18/2019 45 % Final     Nuc Rest EF   Date Value Ref Range Status   10/18/2019 40.0  Final     ECHO: 10/2019  · Moderately decreased left ventricular systolic function. The estimated ejection fraction is 38%  · Local segmental wall motion abnormalities.  · Grade III (severe) left ventricular diastolic dysfunction consistent with restrictive physiology.  · Mild left atrial enlargement.  · Normal right ventricular systolic function.  · Mild right  atrial enlargement.  · Normal central venous pressure (3 mm Hg).  · The estimated PA systolic pressure is 19 mm Hg    EKG: NSR, mild ST elevations diffusely, no acute ischemic changes  Assessment:   1. CAD s/p PCI -- last lipid panel not at goal  2. Chronic HF due to ICM with EF 38%, NYHA class I symptoms  3. HTN -- well controlled3  4. HLD -- see above  5. Paroxysmal AF -- AYLVY5G = 1, on ASA    Plan:   Echo in 3 months  Lipid panel to determine if changes to lipid management needed  Continue lisinopril 10mg daily  Increase Toprol XL to 50mg  Continue ASA    Signed:  Paul Bradley MD  Cardiology Fellow  Pager - [phone]  2/19/2020 2:32 PM

## 2020-02-19 NOTE — PROGRESS NOTES
I have personally taken the history and examined this patient and agree with the Fellow's note as stated above.    I have applauded his not smoking.  Agree with plans

## 2020-02-19 NOTE — PATIENT INSTRUCTIONS
Thank you for coming early to your appointment as I had requested.    #. Hypertension  1. Eat a low salt diet and mediterranean diet as you have been doing; less than 1500mg per day of salt, avoid pre-prepared foods without a documented salt content  2. Take medications as prescribed: Increase your metoprolol as per the prescription  3. Keep a blood pressure diary with twice daily blood pressure measurements, bring to your next appointment.  4. Please exercise at least 30 minutes per day 5 times per week, this makes your heart stronger and reduces blood pressure, preventing additional future heart problems

## 2020-02-21 ENCOUNTER — LAB VISIT (OUTPATIENT)
Dept: LAB | Facility: HOSPITAL | Age: 40
End: 2020-02-21
Payer: COMMERCIAL

## 2020-02-21 ENCOUNTER — TELEPHONE (OUTPATIENT)
Dept: CARDIOLOGY | Facility: CLINIC | Age: 40
End: 2020-02-21

## 2020-02-21 ENCOUNTER — TELEPHONE (OUTPATIENT)
Dept: CARDIOLOGY | Facility: HOSPITAL | Age: 40
End: 2020-02-21

## 2020-02-21 DIAGNOSIS — I25.10 CORONARY ARTERY DISEASE INVOLVING NATIVE CORONARY ARTERY OF NATIVE HEART WITHOUT ANGINA PECTORIS: ICD-10-CM

## 2020-02-21 DIAGNOSIS — I25.10 ATHEROSCLEROSIS OF NATIVE CORONARY ARTERY OF NATIVE HEART WITHOUT ANGINA PECTORIS: Primary | ICD-10-CM

## 2020-02-21 LAB
CHOLEST SERPL-MCNC: 133 MG/DL (ref 120–199)
CHOLEST/HDLC SERPL: 4.2 {RATIO} (ref 2–5)
HDLC SERPL-MCNC: 32 MG/DL (ref 40–75)
HDLC SERPL: 24.1 % (ref 20–50)
LDLC SERPL CALC-MCNC: 88.2 MG/DL (ref 63–159)
NONHDLC SERPL-MCNC: 101 MG/DL
TRIGL SERPL-MCNC: 64 MG/DL (ref 30–150)

## 2020-02-21 PROCEDURE — 36415 COLL VENOUS BLD VENIPUNCTURE: CPT

## 2020-02-21 PROCEDURE — 80061 LIPID PANEL: CPT

## 2020-02-21 RX ORDER — ROSUVASTATIN CALCIUM 40 MG/1
40 TABLET, COATED ORAL NIGHTLY
Qty: 90 TABLET | Refills: 3 | Status: SHIPPED | OUTPATIENT
Start: 2020-02-21 | End: 2021-02-23 | Stop reason: SDUPTHER

## 2020-02-21 NOTE — TELEPHONE ENCOUNTER
I talk to Matthew Oliva he received the message fromkasandra Bradley about his medication change.    Massiel

## 2020-02-21 NOTE — TELEPHONE ENCOUNTER
02/21/2020  1:11 PM    Received lipid panel, LDL is down from 94 to 88, but still above the desired level of 70.     Will switch atorvastatin 80 to rosuvastatin 40, and recheck in 6 weeks. If still higher than 70, will start ezetimibe.    Called patient and left a message about a medication change.    Paul Bradley

## 2020-08-22 ENCOUNTER — HOSPITAL ENCOUNTER (EMERGENCY)
Facility: HOSPITAL | Age: 40
Discharge: HOME | End: 2020-08-22
Attending: EMERGENCY MEDICINE
Payer: COMMERCIAL

## 2020-08-22 VITALS
OXYGEN SATURATION: 96 % | WEIGHT: 230 LBS | HEART RATE: 62 BPM | SYSTOLIC BLOOD PRESSURE: 136 MMHG | TEMPERATURE: 96 F | BODY MASS INDEX: 36.96 KG/M2 | DIASTOLIC BLOOD PRESSURE: 83 MMHG | HEIGHT: 66 IN | RESPIRATION RATE: 18 BRPM

## 2020-08-22 DIAGNOSIS — R42 DIZZINESS: ICD-10-CM

## 2020-08-22 DIAGNOSIS — R42 LIGHTHEADEDNESS: Primary | ICD-10-CM

## 2020-08-22 LAB
ALBUMIN SERPL BCP-MCNC: 4.1 G/DL (ref 3.5–5.2)
ALP SERPL-CCNC: 70 U/L (ref 55–135)
ALT SERPL W/O P-5'-P-CCNC: 34 U/L (ref 10–44)
ANION GAP SERPL CALC-SCNC: 9 MMOL/L (ref 8–16)
AST SERPL-CCNC: 28 U/L (ref 10–40)
BASOPHILS # BLD AUTO: 0.05 K/UL (ref 0–0.2)
BASOPHILS NFR BLD: 0.7 % (ref 0–1.9)
BILIRUB SERPL-MCNC: 0.3 MG/DL (ref 0.1–1)
BNP SERPL-MCNC: 25 PG/ML (ref 0–99)
BUN SERPL-MCNC: 13 MG/DL (ref 6–20)
CALCIUM SERPL-MCNC: 8.8 MG/DL (ref 8.7–10.5)
CHLORIDE SERPL-SCNC: 109 MMOL/L (ref 95–110)
CO2 SERPL-SCNC: 24 MMOL/L (ref 23–29)
CREAT SERPL-MCNC: 1 MG/DL (ref 0.5–1.4)
DIFFERENTIAL METHOD: ABNORMAL
EOSINOPHIL # BLD AUTO: 0.1 K/UL (ref 0–0.5)
EOSINOPHIL NFR BLD: 1.7 % (ref 0–8)
ERYTHROCYTE [DISTWIDTH] IN BLOOD BY AUTOMATED COUNT: 12.8 % (ref 11.5–14.5)
EST. GFR  (AFRICAN AMERICAN): >60 ML/MIN/1.73 M^2
EST. GFR  (NON AFRICAN AMERICAN): >60 ML/MIN/1.73 M^2
GLUCOSE SERPL-MCNC: 94 MG/DL (ref 70–110)
HCT VFR BLD AUTO: 40.3 % (ref 40–54)
HGB BLD-MCNC: 13.5 G/DL (ref 14–18)
IMM GRANULOCYTES # BLD AUTO: 0.03 K/UL (ref 0–0.04)
IMM GRANULOCYTES NFR BLD AUTO: 0.4 % (ref 0–0.5)
LYMPHOCYTES # BLD AUTO: 1.9 K/UL (ref 1–4.8)
LYMPHOCYTES NFR BLD: 25.8 % (ref 18–48)
MCH RBC QN AUTO: 31 PG (ref 27–31)
MCHC RBC AUTO-ENTMCNC: 33.5 G/DL (ref 32–36)
MCV RBC AUTO: 93 FL (ref 82–98)
MONOCYTES # BLD AUTO: 0.6 K/UL (ref 0.3–1)
MONOCYTES NFR BLD: 8 % (ref 4–15)
NEUTROPHILS # BLD AUTO: 4.8 K/UL (ref 1.8–7.7)
NEUTROPHILS NFR BLD: 63.4 % (ref 38–73)
NRBC BLD-RTO: 0 /100 WBC
PLATELET # BLD AUTO: 220 K/UL (ref 150–350)
PMV BLD AUTO: 10.2 FL (ref 9.2–12.9)
POTASSIUM SERPL-SCNC: 4.3 MMOL/L (ref 3.5–5.1)
PROT SERPL-MCNC: 7 G/DL (ref 6–8.4)
RBC # BLD AUTO: 4.35 M/UL (ref 4.6–6.2)
SODIUM SERPL-SCNC: 142 MMOL/L (ref 136–145)
TROPONIN I SERPL DL<=0.01 NG/ML-MCNC: 0.01 NG/ML (ref 0–0.03)
TROPONIN I SERPL DL<=0.01 NG/ML-MCNC: 0.01 NG/ML (ref 0–0.03)
WBC # BLD AUTO: 7.51 K/UL (ref 3.9–12.7)

## 2020-08-22 PROCEDURE — 84484 ASSAY OF TROPONIN QUANT: CPT | Mod: 91

## 2020-08-22 PROCEDURE — 99284 PR EMERGENCY DEPT VISIT,LEVEL IV: ICD-10-PCS | Mod: ,,, | Performed by: EMERGENCY MEDICINE

## 2020-08-22 PROCEDURE — 93010 EKG 12-LEAD: ICD-10-PCS | Mod: ,,, | Performed by: INTERNAL MEDICINE

## 2020-08-22 PROCEDURE — 93010 ELECTROCARDIOGRAM REPORT: CPT | Mod: ,,, | Performed by: INTERNAL MEDICINE

## 2020-08-22 PROCEDURE — 99284 EMERGENCY DEPT VISIT MOD MDM: CPT | Mod: ,,, | Performed by: EMERGENCY MEDICINE

## 2020-08-22 PROCEDURE — 85025 COMPLETE CBC W/AUTO DIFF WBC: CPT

## 2020-08-22 PROCEDURE — 96360 HYDRATION IV INFUSION INIT: CPT

## 2020-08-22 PROCEDURE — 25000003 PHARM REV CODE 250: Performed by: PHYSICIAN ASSISTANT

## 2020-08-22 PROCEDURE — 99285 EMERGENCY DEPT VISIT HI MDM: CPT | Mod: 25

## 2020-08-22 PROCEDURE — 83880 ASSAY OF NATRIURETIC PEPTIDE: CPT

## 2020-08-22 PROCEDURE — 93005 ELECTROCARDIOGRAM TRACING: CPT

## 2020-08-22 PROCEDURE — 80053 COMPREHEN METABOLIC PANEL: CPT

## 2020-08-22 RX ORDER — ASPIRIN 325 MG
325 TABLET ORAL
Status: COMPLETED | OUTPATIENT
Start: 2020-08-22 | End: 2020-08-22

## 2020-08-22 RX ADMIN — ASPIRIN 325 MG ORAL TABLET 325 MG: 325 PILL ORAL at 02:08

## 2020-08-22 RX ADMIN — SODIUM CHLORIDE 250 ML: 0.9 INJECTION, SOLUTION INTRAVENOUS at 02:08

## 2020-08-22 NOTE — DISCHARGE INSTRUCTIONS
Follow up closely with her primary care physician.  Return to the emergency department for new or worsening symptoms.      Imaging Results              X-Ray Chest PA And Lateral (Final result)  Result time 08/22/20 15:19:13      Final result by Casimiro Cade MD (08/22/20 15:19:13)                   Impression:      1. No acute cardiopulmonary process, hypoventilatory exam.      Electronically signed by: Casimiro Cade MD  Date:    08/22/2020  Time:    15:19               Narrative:    EXAMINATION:  XR CHEST PA AND LATERAL    CLINICAL HISTORY:  Dizziness and giddiness    TECHNIQUE:  PA and lateral views of the chest were performed.    COMPARISON:  10/17/2019    FINDINGS:  The cardiomediastinal silhouette is not enlarged.  There is no pleural effusion.  The trachea is midline.  The lungs are symmetrically expanded bilaterally without evidence of acute parenchymal process. No large focal consolidation seen.  There is no pneumothorax.  The osseous structures are remarkable for degenerative changes..                                    No future appointments.  Our goal in the emergency department is to always give you outstanding care and exceptional service. You may receive a survey by mail or e-mail in the next week regarding your experience in our ED. We would greatly appreciate your completing and returning the survey. Your feedback provides us with a way to recognize our staff who give very good care and it helps us learn how to improve when your experience was below our aspiration of excellence.

## 2020-08-22 NOTE — PROVIDER PROGRESS NOTES - EMERGENCY DEPT.
Encounter Date: 8/22/2020    ED Physician Progress Notes           ED Physician Hand-off Note:    ED Course: I assumed care of patient from off-going ED physician team. Briefly, Patient is a 39-year-old male who presents the emergency department with complaints of lightheadedness that last about 10 seconds and spontaneously resolved.  Denies chest pain or shortness of breath.  He has a history of MI and was prompted to come to the emergency department by his wife.    At the time of signout plan was pending second troponin.    Medications given in the ED:    Medications   aspirin tablet 325 mg (325 mg Oral Given 8/22/20 4747)   sodium chloride 0.9% bolus 250 mL (0 mLs Intravenous Stopped 8/22/20 1538)       Imaging Results          X-Ray Chest PA And Lateral (Final result)  Result time 08/22/20 15:19:13    Final result by Casimiro Cade MD (08/22/20 15:19:13)                 Impression:      1. No acute cardiopulmonary process, hypoventilatory exam.      Electronically signed by: Casimiro Cade MD  Date:    08/22/2020  Time:    15:19             Narrative:    EXAMINATION:  XR CHEST PA AND LATERAL    CLINICAL HISTORY:  Dizziness and giddiness    TECHNIQUE:  PA and lateral views of the chest were performed.    COMPARISON:  10/17/2019    FINDINGS:  The cardiomediastinal silhouette is not enlarged.  There is no pleural effusion.  The trachea is midline.  The lungs are symmetrically expanded bilaterally without evidence of acute parenchymal process. No large focal consolidation seen.  There is no pneumothorax.  The osseous structures are remarkable for degenerative changes..                                Disposition:  Discharge  Sec troponin negative.  Patient comfortable with discharge. Patient counseled regarding exam, results, diagnosis, treatment, and plan.  He is instructed to follow up with cardiology or return to the emergency department for any new or worsening symptoms.  He voices understanding.  All  questions answered.    Impression:     Final diagnoses:  [R42] Dizziness  [R42] Lightheadedness (Primary)

## 2020-08-22 NOTE — ED TRIAGE NOTES
Pt presents to the ED c/o dizziness today. Pt reports he went to get something to eat and felt like he was going to pass out. Cardiac hx (4 stents). Dizziness has since resolved. Denies CP, SOB, n/v, HA.

## 2021-04-07 ENCOUNTER — OFFICE VISIT (OUTPATIENT)
Dept: CARDIOLOGY | Facility: CLINIC | Age: 41
End: 2021-04-07
Payer: COMMERCIAL

## 2021-04-07 VITALS
HEIGHT: 66 IN | HEART RATE: 86 BPM | SYSTOLIC BLOOD PRESSURE: 123 MMHG | BODY MASS INDEX: 40.78 KG/M2 | DIASTOLIC BLOOD PRESSURE: 71 MMHG | WEIGHT: 253.75 LBS

## 2021-04-07 DIAGNOSIS — I25.5 ISCHEMIC CARDIOMYOPATHY: ICD-10-CM

## 2021-04-07 DIAGNOSIS — I10 ESSENTIAL HYPERTENSION: Chronic | ICD-10-CM

## 2021-04-07 DIAGNOSIS — F17.210 CIGARETTE SMOKER: Chronic | ICD-10-CM

## 2021-04-07 DIAGNOSIS — R06.83 SNORINGS: ICD-10-CM

## 2021-04-07 DIAGNOSIS — I25.10 CORONARY ARTERY DISEASE INVOLVING NATIVE CORONARY ARTERY OF NATIVE HEART WITHOUT ANGINA PECTORIS: ICD-10-CM

## 2021-04-07 DIAGNOSIS — E78.2 MIXED HYPERLIPIDEMIA: Chronic | ICD-10-CM

## 2021-04-07 DIAGNOSIS — I21.4 NSTEMI (NON-ST ELEVATED MYOCARDIAL INFARCTION): Primary | ICD-10-CM

## 2021-04-07 PROCEDURE — 99215 OFFICE O/P EST HI 40 MIN: CPT | Mod: S$GLB,,,

## 2021-04-07 PROCEDURE — 3074F PR MOST RECENT SYSTOLIC BLOOD PRESSURE < 130 MM HG: ICD-10-PCS | Mod: CPTII,S$GLB,,

## 2021-04-07 PROCEDURE — 99999 PR PBB SHADOW E&M-EST. PATIENT-LVL IV: CPT | Mod: PBBFAC,,,

## 2021-04-07 PROCEDURE — 3078F DIAST BP <80 MM HG: CPT | Mod: CPTII,S$GLB,,

## 2021-04-07 PROCEDURE — 99215 PR OFFICE/OUTPT VISIT, EST, LEVL V, 40-54 MIN: ICD-10-PCS | Mod: S$GLB,,,

## 2021-04-07 PROCEDURE — 3078F PR MOST RECENT DIASTOLIC BLOOD PRESSURE < 80 MM HG: ICD-10-PCS | Mod: CPTII,S$GLB,,

## 2021-04-07 PROCEDURE — 3074F SYST BP LT 130 MM HG: CPT | Mod: CPTII,S$GLB,,

## 2021-04-07 PROCEDURE — 99999 PR PBB SHADOW E&M-EST. PATIENT-LVL IV: ICD-10-PCS | Mod: PBBFAC,,,

## 2021-04-07 RX ORDER — CLONAZEPAM 2 MG/1
1 TABLET ORAL 2 TIMES DAILY PRN
COMMUNITY
Start: 2021-03-30

## 2021-04-14 ENCOUNTER — TELEPHONE (OUTPATIENT)
Dept: CARDIAC REHAB | Facility: CLINIC | Age: 41
End: 2021-04-14

## 2021-04-21 ENCOUNTER — HOSPITAL ENCOUNTER (OUTPATIENT)
Dept: CARDIOLOGY | Facility: HOSPITAL | Age: 41
Discharge: HOME | End: 2021-04-21
Payer: COMMERCIAL

## 2021-04-21 VITALS
DIASTOLIC BLOOD PRESSURE: 71 MMHG | WEIGHT: 253 LBS | BODY MASS INDEX: 40.66 KG/M2 | SYSTOLIC BLOOD PRESSURE: 123 MMHG | HEART RATE: 70 BPM | HEIGHT: 66 IN

## 2021-04-21 DIAGNOSIS — I25.10 CORONARY ARTERY DISEASE INVOLVING NATIVE CORONARY ARTERY OF NATIVE HEART WITHOUT ANGINA PECTORIS: ICD-10-CM

## 2021-04-21 DIAGNOSIS — I25.5 ISCHEMIC CARDIOMYOPATHY: ICD-10-CM

## 2021-04-21 LAB
ASCENDING AORTA: 3.14 CM
AV INDEX (PROSTH): 0.89
AV MEAN GRADIENT: 5 MMHG
AV PEAK GRADIENT: 9 MMHG
AV VALVE AREA: 3.54 CM2
AV VELOCITY RATIO: 0.86
BSA FOR ECHO PROCEDURE: 2.31 M2
CV ECHO LV RWT: 0.4 CM
DOP CALC AO PEAK VEL: 1.48 M/S
DOP CALC AO VTI: 29.77 CM
DOP CALC LVOT AREA: 4 CM2
DOP CALC LVOT DIAMETER: 2.25 CM
DOP CALC LVOT PEAK VEL: 1.27 M/S
DOP CALC LVOT STROKE VOLUME: 105.31 CM3
DOP CALCLVOT PEAK VEL VTI: 26.5 CM
E WAVE DECELERATION TIME: 144.59 MSEC
E/A RATIO: 1.11
E/E' RATIO: 7.55 M/S
ECHO LV POSTERIOR WALL: 1.03 CM (ref 0.6–1.1)
EJECTION FRACTION: 50 %
FRACTIONAL SHORTENING: 25 % (ref 28–44)
INTERVENTRICULAR SEPTUM: 0.96 CM (ref 0.6–1.1)
IVRT: 74.22 MSEC
LA MAJOR: 5.31 CM
LA MINOR: 5.73 CM
LA WIDTH: 4.9 CM
LEFT ATRIUM SIZE: 3.88 CM
LEFT ATRIUM VOLUME INDEX MOD: 30.5 ML/M2
LEFT ATRIUM VOLUME INDEX: 40.3 ML/M2
LEFT ATRIUM VOLUME MOD: 67.42 CM3
LEFT ATRIUM VOLUME: 89.08 CM3
LEFT INTERNAL DIMENSION IN SYSTOLE: 3.88 CM (ref 2.1–4)
LEFT VENTRICLE DIASTOLIC VOLUME INDEX: 57.96 ML/M2
LEFT VENTRICLE DIASTOLIC VOLUME: 128.1 ML
LEFT VENTRICLE MASS INDEX: 87 G/M2
LEFT VENTRICLE SYSTOLIC VOLUME INDEX: 29.4 ML/M2
LEFT VENTRICLE SYSTOLIC VOLUME: 65.02 ML
LEFT VENTRICULAR INTERNAL DIMENSION IN DIASTOLE: 5.18 CM (ref 3.5–6)
LEFT VENTRICULAR MASS: 191.64 G
LV LATERAL E/E' RATIO: 7.55 M/S
LV SEPTAL E/E' RATIO: 7.55 M/S
MV A" WAVE DURATION": 16.84 MSEC
MV PEAK A VEL: 0.75 M/S
MV PEAK E VEL: 0.83 M/S
MV STENOSIS PRESSURE HALF TIME: 41.93 MS
MV VALVE AREA P 1/2 METHOD: 5.25 CM2
PISA TR MAX VEL: 2.09 M/S
PULM VEIN S/D RATIO: 1.11
PV PEAK D VEL: 0.54 M/S
PV PEAK S VEL: 0.6 M/S
RA MAJOR: 4.33 CM
RA PRESSURE: 3 MMHG
RA WIDTH: 3.53 CM
RIGHT VENTRICULAR END-DIASTOLIC DIMENSION: 2.91 CM
RV TISSUE DOPPLER FREE WALL SYSTOLIC VELOCITY 1 (APICAL 4 CHAMBER VIEW): 14.28 CM/S
SINUS: 2.92 CM
STJ: 2.49 CM
TDI LATERAL: 0.11 M/S
TDI SEPTAL: 0.11 M/S
TDI: 0.11 M/S
TR MAX PG: 17 MMHG
TRICUSPID ANNULAR PLANE SYSTOLIC EXCURSION: 2.49 CM
TV REST PULMONARY ARTERY PRESSURE: 20 MMHG

## 2021-04-21 PROCEDURE — 93306 ECHO (CUPID ONLY): ICD-10-PCS | Mod: 26,,, | Performed by: INTERNAL MEDICINE

## 2021-04-21 PROCEDURE — 93306 TTE W/DOPPLER COMPLETE: CPT | Mod: 26,,, | Performed by: INTERNAL MEDICINE

## 2021-04-21 PROCEDURE — 93306 TTE W/DOPPLER COMPLETE: CPT

## 2021-04-29 NOTE — ASSESSMENT & PLAN NOTE
25 pack year hx; currently 1ppd  - has attempted to quit in the past but not currently motivated to do so.    - significant CAD risk factor  - nicotine patch   Unknown

## 2021-05-03 ENCOUNTER — HOSPITAL ENCOUNTER (EMERGENCY)
Facility: HOSPITAL | Age: 41
Discharge: HOME | End: 2021-05-04
Attending: EMERGENCY MEDICINE
Payer: COMMERCIAL

## 2021-05-03 DIAGNOSIS — R20.0 NUMBNESS: Primary | ICD-10-CM

## 2021-05-03 LAB
ALBUMIN SERPL BCP-MCNC: 4.3 G/DL (ref 3.5–5.2)
ALP SERPL-CCNC: 83 U/L (ref 55–135)
ALT SERPL W/O P-5'-P-CCNC: 34 U/L (ref 10–44)
ANION GAP SERPL CALC-SCNC: 9 MMOL/L (ref 8–16)
AST SERPL-CCNC: 34 U/L (ref 10–40)
BASOPHILS # BLD AUTO: 0.04 K/UL (ref 0–0.2)
BASOPHILS NFR BLD: 0.4 % (ref 0–1.9)
BILIRUB SERPL-MCNC: 0.3 MG/DL (ref 0.1–1)
BNP SERPL-MCNC: 24 PG/ML (ref 0–99)
BUN SERPL-MCNC: 18 MG/DL (ref 6–20)
CALCIUM SERPL-MCNC: 9.8 MG/DL (ref 8.7–10.5)
CHLORIDE SERPL-SCNC: 104 MMOL/L (ref 95–110)
CO2 SERPL-SCNC: 25 MMOL/L (ref 23–29)
CREAT SERPL-MCNC: 1 MG/DL (ref 0.5–1.4)
DIFFERENTIAL METHOD: ABNORMAL
EOSINOPHIL # BLD AUTO: 0.2 K/UL (ref 0–0.5)
EOSINOPHIL NFR BLD: 1.7 % (ref 0–8)
ERYTHROCYTE [DISTWIDTH] IN BLOOD BY AUTOMATED COUNT: 12.7 % (ref 11.5–14.5)
EST. GFR  (AFRICAN AMERICAN): >60 ML/MIN/1.73 M^2
EST. GFR  (NON AFRICAN AMERICAN): >60 ML/MIN/1.73 M^2
GLUCOSE SERPL-MCNC: 105 MG/DL (ref 70–110)
HCT VFR BLD AUTO: 41.6 % (ref 40–54)
HGB BLD-MCNC: 14.2 G/DL (ref 14–18)
IMM GRANULOCYTES # BLD AUTO: 0.04 K/UL (ref 0–0.04)
IMM GRANULOCYTES NFR BLD AUTO: 0.4 % (ref 0–0.5)
LYMPHOCYTES # BLD AUTO: 1.9 K/UL (ref 1–4.8)
LYMPHOCYTES NFR BLD: 20.2 % (ref 18–48)
MCH RBC QN AUTO: 31.2 PG (ref 27–31)
MCHC RBC AUTO-ENTMCNC: 34.1 G/DL (ref 32–36)
MCV RBC AUTO: 91 FL (ref 82–98)
MONOCYTES # BLD AUTO: 0.7 K/UL (ref 0.3–1)
MONOCYTES NFR BLD: 7.2 % (ref 4–15)
NEUTROPHILS # BLD AUTO: 6.4 K/UL (ref 1.8–7.7)
NEUTROPHILS NFR BLD: 70.1 % (ref 38–73)
NRBC BLD-RTO: 0 /100 WBC
PLATELET # BLD AUTO: 215 K/UL (ref 150–450)
PMV BLD AUTO: 10.1 FL (ref 9.2–12.9)
POTASSIUM SERPL-SCNC: 4.4 MMOL/L (ref 3.5–5.1)
PROT SERPL-MCNC: 7.7 G/DL (ref 6–8.4)
RBC # BLD AUTO: 4.55 M/UL (ref 4.6–6.2)
SODIUM SERPL-SCNC: 138 MMOL/L (ref 136–145)
TROPONIN I SERPL DL<=0.01 NG/ML-MCNC: <0.006 NG/ML (ref 0–0.03)
WBC # BLD AUTO: 9.17 K/UL (ref 3.9–12.7)

## 2021-05-03 PROCEDURE — 93010 ELECTROCARDIOGRAM REPORT: CPT | Mod: ,,, | Performed by: INTERNAL MEDICINE

## 2021-05-03 PROCEDURE — 80053 COMPREHEN METABOLIC PANEL: CPT | Performed by: EMERGENCY MEDICINE

## 2021-05-03 PROCEDURE — 83880 ASSAY OF NATRIURETIC PEPTIDE: CPT | Performed by: EMERGENCY MEDICINE

## 2021-05-03 PROCEDURE — 86803 HEPATITIS C AB TEST: CPT | Performed by: EMERGENCY MEDICINE

## 2021-05-03 PROCEDURE — 93010 EKG 12-LEAD: ICD-10-PCS | Mod: ,,, | Performed by: INTERNAL MEDICINE

## 2021-05-03 PROCEDURE — 84484 ASSAY OF TROPONIN QUANT: CPT | Performed by: EMERGENCY MEDICINE

## 2021-05-03 PROCEDURE — 93005 ELECTROCARDIOGRAM TRACING: CPT

## 2021-05-03 PROCEDURE — 99285 EMERGENCY DEPT VISIT HI MDM: CPT | Mod: 25

## 2021-05-03 PROCEDURE — 85025 COMPLETE CBC W/AUTO DIFF WBC: CPT | Performed by: EMERGENCY MEDICINE

## 2021-05-03 PROCEDURE — 99284 EMERGENCY DEPT VISIT MOD MDM: CPT | Mod: ,,, | Performed by: EMERGENCY MEDICINE

## 2021-05-03 PROCEDURE — 99284 PR EMERGENCY DEPT VISIT,LEVEL IV: ICD-10-PCS | Mod: ,,, | Performed by: EMERGENCY MEDICINE

## 2021-05-04 VITALS
DIASTOLIC BLOOD PRESSURE: 59 MMHG | HEART RATE: 68 BPM | SYSTOLIC BLOOD PRESSURE: 116 MMHG | BODY MASS INDEX: 37.89 KG/M2 | TEMPERATURE: 98 F | OXYGEN SATURATION: 95 % | RESPIRATION RATE: 20 BRPM | WEIGHT: 250 LBS | HEIGHT: 68 IN

## 2021-05-04 LAB
HCV AB SERPL QL IA: NEGATIVE
TROPONIN I SERPL DL<=0.01 NG/ML-MCNC: <0.006 NG/ML (ref 0–0.03)

## 2021-05-04 PROCEDURE — 93010 ELECTROCARDIOGRAM REPORT: CPT | Mod: ,,, | Performed by: INTERNAL MEDICINE

## 2021-05-04 PROCEDURE — 84484 ASSAY OF TROPONIN QUANT: CPT | Performed by: EMERGENCY MEDICINE

## 2021-05-04 PROCEDURE — 93010 EKG 12-LEAD: ICD-10-PCS | Mod: ,,, | Performed by: INTERNAL MEDICINE

## 2021-05-04 PROCEDURE — 93005 ELECTROCARDIOGRAM TRACING: CPT

## 2021-05-05 ENCOUNTER — PES CALL (OUTPATIENT)
Dept: ADMINISTRATIVE | Facility: CLINIC | Age: 41
End: 2021-05-05

## 2021-05-21 ENCOUNTER — OFFICE VISIT (OUTPATIENT)
Dept: CARDIOLOGY | Facility: CLINIC | Age: 41
End: 2021-05-21
Payer: COMMERCIAL

## 2021-05-21 ENCOUNTER — TELEPHONE (OUTPATIENT)
Dept: CARDIAC REHAB | Facility: CLINIC | Age: 41
End: 2021-05-21

## 2021-05-21 ENCOUNTER — LAB VISIT (OUTPATIENT)
Dept: LAB | Facility: HOSPITAL | Age: 41
End: 2021-05-21
Payer: COMMERCIAL

## 2021-05-21 VITALS
WEIGHT: 252.63 LBS | DIASTOLIC BLOOD PRESSURE: 69 MMHG | HEIGHT: 66 IN | HEART RATE: 75 BPM | SYSTOLIC BLOOD PRESSURE: 130 MMHG | BODY MASS INDEX: 40.6 KG/M2

## 2021-05-21 DIAGNOSIS — I48.0 PAROXYSMAL A-FIB: ICD-10-CM

## 2021-05-21 DIAGNOSIS — I50.32 CHRONIC DIASTOLIC HEART FAILURE: Primary | ICD-10-CM

## 2021-05-21 DIAGNOSIS — I25.10 CORONARY ARTERY DISEASE INVOLVING NATIVE CORONARY ARTERY OF NATIVE HEART WITHOUT ANGINA PECTORIS: ICD-10-CM

## 2021-05-21 DIAGNOSIS — F17.210 CIGARETTE SMOKER: Chronic | ICD-10-CM

## 2021-05-21 DIAGNOSIS — I10 ESSENTIAL HYPERTENSION: Chronic | ICD-10-CM

## 2021-05-21 DIAGNOSIS — E78.2 MIXED HYPERLIPIDEMIA: Chronic | ICD-10-CM

## 2021-05-21 DIAGNOSIS — I21.4 NSTEMI (NON-ST ELEVATED MYOCARDIAL INFARCTION): ICD-10-CM

## 2021-05-21 PROBLEM — I50.22 CHRONIC SYSTOLIC CONGESTIVE HEART FAILURE: Status: RESOLVED | Noted: 2019-11-15 | Resolved: 2021-05-21

## 2021-05-21 LAB
CHOLEST SERPL-MCNC: 135 MG/DL (ref 120–199)
CHOLEST/HDLC SERPL: 5.4 {RATIO} (ref 2–5)
HDLC SERPL-MCNC: 25 MG/DL (ref 40–75)
HDLC SERPL: 18.5 % (ref 20–50)
LDLC SERPL CALC-MCNC: 63.4 MG/DL (ref 63–159)
NONHDLC SERPL-MCNC: 110 MG/DL
TRIGL SERPL-MCNC: 233 MG/DL (ref 30–150)

## 2021-05-21 PROCEDURE — 80061 LIPID PANEL: CPT

## 2021-05-21 PROCEDURE — 99999 PR PBB SHADOW E&M-EST. PATIENT-LVL IV: ICD-10-PCS | Mod: PBBFAC,,,

## 2021-05-21 PROCEDURE — 99214 PR OFFICE/OUTPT VISIT, EST, LEVL IV, 30-39 MIN: ICD-10-PCS | Mod: S$GLB,,,

## 2021-05-21 PROCEDURE — 36415 COLL VENOUS BLD VENIPUNCTURE: CPT

## 2021-05-21 PROCEDURE — 99214 OFFICE O/P EST MOD 30 MIN: CPT | Mod: S$GLB,,,

## 2021-05-21 PROCEDURE — 99999 PR PBB SHADOW E&M-EST. PATIENT-LVL IV: CPT | Mod: PBBFAC,,,

## 2021-05-28 ENCOUNTER — TELEPHONE (OUTPATIENT)
Dept: SLEEP MEDICINE | Facility: CLINIC | Age: 41
End: 2021-05-28

## 2021-05-31 ENCOUNTER — OFFICE VISIT (OUTPATIENT)
Dept: SLEEP MEDICINE | Facility: CLINIC | Age: 41
End: 2021-05-31
Payer: COMMERCIAL

## 2021-05-31 VITALS
HEART RATE: 75 BPM | BODY MASS INDEX: 40.87 KG/M2 | HEIGHT: 66 IN | WEIGHT: 254.31 LBS | DIASTOLIC BLOOD PRESSURE: 76 MMHG | SYSTOLIC BLOOD PRESSURE: 120 MMHG

## 2021-05-31 DIAGNOSIS — R06.83 SNORINGS: ICD-10-CM

## 2021-05-31 DIAGNOSIS — G47.30 SLEEP APNEA, UNSPECIFIED TYPE: Primary | ICD-10-CM

## 2021-05-31 PROCEDURE — 3008F BODY MASS INDEX DOCD: CPT | Mod: CPTII,S$GLB,, | Performed by: PSYCHIATRY & NEUROLOGY

## 2021-05-31 PROCEDURE — 3008F PR BODY MASS INDEX (BMI) DOCUMENTED: ICD-10-PCS | Mod: CPTII,S$GLB,, | Performed by: PSYCHIATRY & NEUROLOGY

## 2021-05-31 PROCEDURE — 99204 PR OFFICE/OUTPT VISIT, NEW, LEVL IV, 45-59 MIN: ICD-10-PCS | Mod: S$GLB,,, | Performed by: PSYCHIATRY & NEUROLOGY

## 2021-05-31 PROCEDURE — 99999 PR PBB SHADOW E&M-EST. PATIENT-LVL III: ICD-10-PCS | Mod: PBBFAC,,, | Performed by: PSYCHIATRY & NEUROLOGY

## 2021-05-31 PROCEDURE — 1126F AMNT PAIN NOTED NONE PRSNT: CPT | Mod: S$GLB,,, | Performed by: PSYCHIATRY & NEUROLOGY

## 2021-05-31 PROCEDURE — 99204 OFFICE O/P NEW MOD 45 MIN: CPT | Mod: S$GLB,,, | Performed by: PSYCHIATRY & NEUROLOGY

## 2021-05-31 PROCEDURE — 1126F PR PAIN SEVERITY QUANTIFIED, NO PAIN PRESENT: ICD-10-PCS | Mod: S$GLB,,, | Performed by: PSYCHIATRY & NEUROLOGY

## 2021-05-31 PROCEDURE — 99999 PR PBB SHADOW E&M-EST. PATIENT-LVL III: CPT | Mod: PBBFAC,,, | Performed by: PSYCHIATRY & NEUROLOGY

## 2021-06-02 ENCOUNTER — TELEPHONE (OUTPATIENT)
Dept: SLEEP MEDICINE | Facility: OTHER | Age: 41
End: 2021-06-02

## 2021-06-03 ENCOUNTER — HOSPITAL ENCOUNTER (OUTPATIENT)
Dept: SLEEP MEDICINE | Facility: OTHER | Age: 41
Discharge: HOME | End: 2021-06-03
Attending: PSYCHIATRY & NEUROLOGY
Payer: COMMERCIAL

## 2021-06-03 DIAGNOSIS — G47.30 SLEEP APNEA, UNSPECIFIED TYPE: ICD-10-CM

## 2021-06-03 DIAGNOSIS — G47.33 OSA (OBSTRUCTIVE SLEEP APNEA): ICD-10-CM

## 2021-06-03 PROCEDURE — 95800 SLP STDY UNATTENDED: CPT | Mod: 26,52,, | Performed by: PSYCHIATRY & NEUROLOGY

## 2021-06-03 PROCEDURE — 95800 PR SLEEP STUDY, UNATTENDED, RECORD HEART RATE/O2 SAT/RESP ANAL/SLEEP TIME: ICD-10-PCS | Mod: 26,52,, | Performed by: PSYCHIATRY & NEUROLOGY

## 2021-06-03 PROCEDURE — 95800 SLP STDY UNATTENDED: CPT

## 2021-06-10 ENCOUNTER — TELEPHONE (OUTPATIENT)
Dept: SLEEP MEDICINE | Facility: CLINIC | Age: 41
End: 2021-06-10

## 2021-06-10 DIAGNOSIS — G47.33 OSA (OBSTRUCTIVE SLEEP APNEA): Primary | ICD-10-CM

## 2022-01-13 ENCOUNTER — LAB VISIT (OUTPATIENT)
Dept: PRIMARY CARE CLINIC | Facility: CLINIC | Age: 42
End: 2022-01-13
Payer: COMMERCIAL

## 2022-01-13 DIAGNOSIS — Z20.822 CONTACT WITH AND (SUSPECTED) EXPOSURE TO COVID-19: ICD-10-CM

## 2022-01-13 LAB
CTP QC/QA: YES
SARS-COV-2 AG RESP QL IA.RAPID: NEGATIVE

## 2022-01-13 PROCEDURE — 87811 SARS-COV-2 COVID19 W/OPTIC: CPT

## 2022-01-18 ENCOUNTER — LAB VISIT (OUTPATIENT)
Dept: PRIMARY CARE CLINIC | Facility: CLINIC | Age: 42
End: 2022-01-18
Payer: COMMERCIAL

## 2022-01-18 ENCOUNTER — PATIENT MESSAGE (OUTPATIENT)
Dept: ADMINISTRATIVE | Facility: OTHER | Age: 42
End: 2022-01-18
Payer: COMMERCIAL

## 2022-01-18 DIAGNOSIS — Z20.822 CONTACT WITH AND (SUSPECTED) EXPOSURE TO COVID-19: ICD-10-CM

## 2022-01-18 LAB
CTP QC/QA: YES
SARS-COV-2 AG RESP QL IA.RAPID: NEGATIVE

## 2022-01-18 PROCEDURE — 87811 SARS-COV-2 COVID19 W/OPTIC: CPT

## 2022-03-16 ENCOUNTER — HOSPITAL ENCOUNTER (EMERGENCY)
Facility: HOSPITAL | Age: 42
Discharge: HOME | End: 2022-03-16
Attending: EMERGENCY MEDICINE
Payer: COMMERCIAL

## 2022-03-16 VITALS
OXYGEN SATURATION: 95 % | WEIGHT: 254 LBS | HEART RATE: 70 BPM | RESPIRATION RATE: 20 BRPM | DIASTOLIC BLOOD PRESSURE: 70 MMHG | TEMPERATURE: 98 F | SYSTOLIC BLOOD PRESSURE: 121 MMHG | BODY MASS INDEX: 41 KG/M2

## 2022-03-16 DIAGNOSIS — R53.83 FATIGUE, UNSPECIFIED TYPE: ICD-10-CM

## 2022-03-16 DIAGNOSIS — R06.02 SHORTNESS OF BREATH: Primary | ICD-10-CM

## 2022-03-16 PROBLEM — I25.5 ISCHEMIC CARDIOMYOPATHY: Status: ACTIVE | Noted: 2022-03-16

## 2022-03-16 PROBLEM — I50.42 CHRONIC COMBINED SYSTOLIC AND DIASTOLIC HEART FAILURE: Status: ACTIVE | Noted: 2018-06-22

## 2022-03-16 PROBLEM — E66.01 MORBID OBESITY: Status: ACTIVE | Noted: 2022-03-16

## 2022-03-16 PROBLEM — Z79.01 CHRONIC ANTICOAGULATION: Status: ACTIVE | Noted: 2022-03-16

## 2022-03-16 LAB
ALBUMIN SERPL BCP-MCNC: 4.3 G/DL (ref 3.5–5.2)
ALP SERPL-CCNC: 74 U/L (ref 55–135)
ALT SERPL W/O P-5'-P-CCNC: 43 U/L (ref 10–44)
ANION GAP SERPL CALC-SCNC: 7 MMOL/L (ref 8–16)
AST SERPL-CCNC: 32 U/L (ref 10–40)
BASOPHILS # BLD AUTO: 0.04 K/UL (ref 0–0.2)
BASOPHILS NFR BLD: 0.5 % (ref 0–1.9)
BILIRUB SERPL-MCNC: 0.3 MG/DL (ref 0.1–1)
BNP SERPL-MCNC: 11 PG/ML (ref 0–99)
BUN SERPL-MCNC: 17 MG/DL (ref 6–20)
CALCIUM SERPL-MCNC: 9.7 MG/DL (ref 8.7–10.5)
CHLORIDE SERPL-SCNC: 103 MMOL/L (ref 95–110)
CO2 SERPL-SCNC: 28 MMOL/L (ref 23–29)
CREAT SERPL-MCNC: 0.8 MG/DL (ref 0.5–1.4)
DIFFERENTIAL METHOD: ABNORMAL
EOSINOPHIL # BLD AUTO: 0.1 K/UL (ref 0–0.5)
EOSINOPHIL NFR BLD: 1.6 % (ref 0–8)
ERYTHROCYTE [DISTWIDTH] IN BLOOD BY AUTOMATED COUNT: 12.6 % (ref 11.5–14.5)
EST. GFR  (AFRICAN AMERICAN): >60 ML/MIN/1.73 M^2
EST. GFR  (NON AFRICAN AMERICAN): >60 ML/MIN/1.73 M^2
GLUCOSE SERPL-MCNC: 93 MG/DL (ref 70–110)
HCT VFR BLD AUTO: 40.9 % (ref 40–54)
HGB BLD-MCNC: 13.6 G/DL (ref 14–18)
IMM GRANULOCYTES # BLD AUTO: 0.04 K/UL (ref 0–0.04)
IMM GRANULOCYTES NFR BLD AUTO: 0.5 % (ref 0–0.5)
LYMPHOCYTES # BLD AUTO: 2 K/UL (ref 1–4.8)
LYMPHOCYTES NFR BLD: 23.3 % (ref 18–48)
MCH RBC QN AUTO: 31.1 PG (ref 27–31)
MCHC RBC AUTO-ENTMCNC: 33.3 G/DL (ref 32–36)
MCV RBC AUTO: 93 FL (ref 82–98)
MONOCYTES # BLD AUTO: 0.6 K/UL (ref 0.3–1)
MONOCYTES NFR BLD: 6.9 % (ref 4–15)
NEUTROPHILS # BLD AUTO: 5.6 K/UL (ref 1.8–7.7)
NEUTROPHILS NFR BLD: 67.2 % (ref 38–73)
NRBC BLD-RTO: 0 /100 WBC
PLATELET # BLD AUTO: 204 K/UL (ref 150–450)
PMV BLD AUTO: 10.2 FL (ref 9.2–12.9)
POTASSIUM SERPL-SCNC: 4.1 MMOL/L (ref 3.5–5.1)
PROT SERPL-MCNC: 7 G/DL (ref 6–8.4)
RBC # BLD AUTO: 4.38 M/UL (ref 4.6–6.2)
SODIUM SERPL-SCNC: 138 MMOL/L (ref 136–145)
TROPONIN I SERPL DL<=0.01 NG/ML-MCNC: <0.006 NG/ML (ref 0–0.03)
WBC # BLD AUTO: 8.38 K/UL (ref 3.9–12.7)

## 2022-03-16 PROCEDURE — 99285 EMERGENCY DEPT VISIT HI MDM: CPT | Mod: ,,, | Performed by: PHYSICIAN ASSISTANT

## 2022-03-16 PROCEDURE — 99285 PR EMERGENCY DEPT VISIT,LEVEL V: ICD-10-PCS | Mod: ,,, | Performed by: PHYSICIAN ASSISTANT

## 2022-03-16 PROCEDURE — 83880 ASSAY OF NATRIURETIC PEPTIDE: CPT | Performed by: PHYSICIAN ASSISTANT

## 2022-03-16 PROCEDURE — 93010 ELECTROCARDIOGRAM REPORT: CPT | Mod: ,,, | Performed by: INTERNAL MEDICINE

## 2022-03-16 PROCEDURE — 85025 COMPLETE CBC W/AUTO DIFF WBC: CPT | Performed by: PHYSICIAN ASSISTANT

## 2022-03-16 PROCEDURE — 84484 ASSAY OF TROPONIN QUANT: CPT | Performed by: PHYSICIAN ASSISTANT

## 2022-03-16 PROCEDURE — 93010 EKG 12-LEAD: ICD-10-PCS | Mod: ,,, | Performed by: INTERNAL MEDICINE

## 2022-03-16 PROCEDURE — 80053 COMPREHEN METABOLIC PANEL: CPT | Performed by: PHYSICIAN ASSISTANT

## 2022-03-16 PROCEDURE — 87389 HIV-1 AG W/HIV-1&-2 AB AG IA: CPT | Performed by: EMERGENCY MEDICINE

## 2022-03-16 PROCEDURE — 99285 EMERGENCY DEPT VISIT HI MDM: CPT | Mod: 25

## 2022-03-16 PROCEDURE — 86803 HEPATITIS C AB TEST: CPT | Performed by: EMERGENCY MEDICINE

## 2022-03-16 PROCEDURE — 93005 ELECTROCARDIOGRAM TRACING: CPT

## 2022-03-16 NOTE — ED PROVIDER NOTES
"Encounter Date: 3/16/2022       History     Chief Complaint   Patient presents with    Dizziness     Sob and dizziness. States "its affecting my job." Reports lack of energy. Reports symptoms have been present X2 years.      40 y/o M with history of HTN, HFpEF (38%), CAD with stents presents to the ED c/o generalized weakness for "awhile". He reports these symptoms have been progressively getting worse over the past 3-4 months.  This morning, he had some lightheadedness.  He reports LEVI for months, cramping to hands. He denies any acute change today.  He denies fever, chills, chest pain, LE edema, abdominal pain, headache, nausea without vomiting, dysuria, diarrhea, URI symptoms.     The history is provided by the patient.     Review of patient's allergies indicates:  No Known Allergies  Past Medical History:   Diagnosis Date    Anxiety     Carbon monoxide poisoning     Caused a seizure    Chronic systolic congestive heart failure 11/15/2019    Coronary artery disease     History of heart artery stent 2013    Hypertension     Obesity      Past Surgical History:   Procedure Laterality Date    CORONARY ANGIOPLASTY      LEFT HEART CATHETERIZATION Right 2018    Procedure: Left heart cath;  Surgeon: Dieter Vargas MD;  Location: [facility] CATH LAB;  Service: Cardiology;  Laterality: Right;     Family History   Problem Relation Age of Onset    Heart disease Mother     Heart disease Father      Social History     Tobacco Use    Smoking status: Former Smoker     Packs/day: 1.00     Years: 20.00     Pack years: 20.00     Types: Cigarettes     Quit date: 2019     Years since quittin.3    Smokeless tobacco: Never Used   Substance Use Topics    Alcohol use: Not Currently     Comment: occasional. none in 2 months    Drug use: Yes     Types: Marijuana     Review of Systems   Constitutional: Positive for fatigue. Negative for chills and fever.   HENT: Negative for congestion, postnasal drip and sore " throat.    Eyes: Negative for photophobia and visual disturbance.   Respiratory: Positive for shortness of breath. Negative for cough.    Cardiovascular: Negative for chest pain.   Gastrointestinal: Negative for abdominal pain, constipation, diarrhea, nausea and vomiting.   Genitourinary: Negative for dysuria and hematuria.   Musculoskeletal: Negative for back pain.        +muscle cramping   Skin: Negative for rash.   Neurological: Positive for weakness (generalized) and light-headedness. Negative for dizziness, syncope and headaches.   Psychiatric/Behavioral: Negative for confusion.       Physical Exam     Initial Vitals [03/16/22 1211]   BP Pulse Resp Temp SpO2   (!) 158/95 88 18 99.1 °F (37.3 °C) 97 %      MAP       --         Physical Exam    Nursing note and vitals reviewed.  Constitutional: He appears well-developed and well-nourished. He is not diaphoretic. No distress.   HENT:   Head: Normocephalic and atraumatic.   Neck: Neck supple.   Normal range of motion.  Cardiovascular: Normal rate, regular rhythm and normal heart sounds. Exam reveals no gallop and no friction rub.    No murmur heard.  No LE edema   Pulmonary/Chest: Breath sounds normal. He has no wheezes. He has no rhonchi. He has no rales. He exhibits no tenderness.   Abdominal: Abdomen is soft. Bowel sounds are normal. There is no abdominal tenderness. There is no rebound and no guarding.   Musculoskeletal:         General: Normal range of motion.      Cervical back: Normal range of motion and neck supple.     Neurological: He is alert and oriented to person, place, and time.   Skin: Skin is warm and dry. No rash noted. No erythema.   Psychiatric: He has a normal mood and affect.         ED Course   Procedures  Labs Reviewed   CBC W/ AUTO DIFFERENTIAL - Abnormal; Notable for the following components:       Result Value    RBC 4.38 (*)     Hemoglobin 13.6 (*)     MCH 31.1 (*)     All other components within normal limits   COMPREHENSIVE METABOLIC  PANEL - Abnormal; Notable for the following components:    Anion Gap 7 (*)     All other components within normal limits   TROPONIN I   B-TYPE NATRIURETIC PEPTIDE   HIV 1 / 2 ANTIBODY   HEPATITIS C ANTIBODY        ECG Results          EKG 12-lead (Final result)  Result time 03/16/22 13:41:00    Final result by Interface, Lab In [facility] (03/16/22 13:41:00)                 Narrative:    Test Reason : R06.02,    Vent. Rate : 076 BPM     Atrial Rate : 076 BPM     P-R Int : 162 ms          QRS Dur : 092 ms      QT Int : 376 ms       P-R-T Axes : 041 085 050 degrees     QTc Int : 423 ms    Normal sinus rhythm  Anteroseptal infarct (cited on or before 22-AUG-2020)  Abnormal ECG  When compared with ECG of 04-MAY-2021 00:18,  No significant change was found  Confirmed by Pool Bauer MD ([id number]) on 3/16/2022 1:40:52 PM    Referred By:             Confirmed By:Favio Bauer MD                            Imaging Results          X-Ray Chest PA And Lateral (Final result)  Result time 03/16/22 13:24:03    Final result by Aleksandr Armendariz MD (03/16/22 13:24:03)                 Impression:      No significant intrathoracic abnormality.  No significant detrimental interval change in the appearance of the chest since the examinations referenced above is appreciated.      Electronically signed by: Aleksandr Armendariz MD  Date:    03/16/2022  Time:    13:24             Narrative:    EXAMINATION:  XR CHEST PA AND LATERAL    CLINICAL HISTORY:  shortness of breath;    TECHNIQUE:  Two views of the chest were obtained, with PA and lateral projections submitted.    COMPARISON:  Comparison is made to 05/04/2021 and 08/22/2020.  Clinical information of shortness of breath.    FINDINGS:  Heart size is normal, as is the appearance of the pulmonary vascularity, with no findings indicating current cardiac decompensation observed.  Lung zones are clear, and are free of significant airspace consolidation or volume loss.  No pleural fluid.  No abnormal  "mediastinal widening.  No pneumothorax.                                 Medications - No data to display  Medical Decision Making:   History:   Old Medical Records: I decided to obtain old medical records.  Old Records Summarized: records from clinic visits and records from previous admission(s).       <> Summary of Records: Last cardiology clinic appt May 2021  10/2019 - stress with defect in distribution of LAD  Clinical Tests:   Lab Tests: Ordered and Reviewed  Radiological Study: Ordered and Reviewed  Medical Tests: Reviewed and Ordered  Other:   I have discussed this case with another health care provider.       <> Summary of the Discussion: Cardiology        APC / Resident Notes:   40 y/o M with history of HTN, HFpEF (38%), CAD with stents presents to the ED c/o generalized weakness for "awhile". VSS. RRR. Lungs clear. Abdomen soft and nontender. No chest wall tenderness. No LE edema. DDx includes but is not limited to ACS, worsening HF, fluid overload, anemia, electrolyte abnormality. Will get labs, cxr.    No leukocytosis. Mild anemia noted. CMP unremarkable. Troponin and BNP WNL.    CXR independently reviewed - no acute abnormalities.    EKG - no change from prior.    Discussed with cardiology - they do not feel that he needs inpatient stress or echo. Ok to discharge home to follow up in clinic.     I do not feel that he needs any further labs or imaging at this time. Stable for discharge.    He was discharged without any new prescriptions.  He will follow up with cardiology.  Strict ED return precautions given.  All of the patient's questions were answered.  I reviewed the patient's chart, labs, and imaging and discussed the case with my supervising physician.          Attending Attestation:     Physician Attestation Statement for NP/PA:   I discussed this assessment and plan of this patient with the NP/PA, but I did not personally examine the patient. The face to face encounter was performed by the " NP/PA.                       Clinical Impression:   Final diagnoses:  [R06.02] Shortness of breath (Primary)  [R53.83] Fatigue, unspecified type          ED Disposition Condition    Discharge Stable        ED Prescriptions     None        Follow-up Information     Follow up With Specialties Details Why Contact Info Additional Information    [facility] - Cardiology - 3rd Fl Cardiology Schedule an appointment as soon as possible for a visit   [facility] [facility] [id number] [phone] Cardiology Services Clinics - 3rd floor    [facility] - Emergency Dept Emergency Medicine  If symptoms worsen [facility] [facility] [id number] [phone]            Dorie Quiroga PA-C  03/16/22 1504       Yomaira Guadalupe MD  03/16/22 1501

## 2022-03-16 NOTE — ED NOTES
Patient identifiers verified and correct for Mr Castro  C/C: Gen weakness SEE NN  APPEARANCE: awake and alert in NAD.  SKIN: warm, dry and intact. No breakdown or bruising.  MUSCULOSKELETAL: Patient moving all extremities spontaneously, no obvious swelling or deformities noted. Ambulates independently.  RESPIRATORY: Denies shortness of breath.Respirations unlabored.   CARDIAC: Denies CP, 2+ distal pulses; no peripheral edema  ABDOMEN: S/ND/NT, Denies nausea  : voids spontaneously, denies difficulty  Neurologic: AAO x 4; follows commands equal strength in all extremities; denies numbness/tingling. Denies dizziness  Reports gen weakness and intermitten lightheaded

## 2022-03-16 NOTE — ED NOTES
Patient states overall weakness x 2 years, intermittent, states worse x 2 years, concerned about strength in harman hands x 2 years. Klonopin and Norco last night, need refill on cholesterol medication

## 2022-03-16 NOTE — Clinical Note
"Cody Kendall" Matthew was seen and treated in our emergency department on 3/16/2022.  He may return to work on 03/19/2022.       If you have any questions or concerns, please don't hesitate to call.      Dorie Quiroga PA-C"

## 2022-03-17 LAB
HCV AB SERPL QL IA: NEGATIVE
HIV 1+2 AB+HIV1 P24 AG SERPL QL IA: NEGATIVE

## 2022-03-18 ENCOUNTER — OFFICE VISIT (OUTPATIENT)
Dept: CARDIOLOGY | Facility: CLINIC | Age: 42
End: 2022-03-18
Payer: COMMERCIAL

## 2022-03-18 VITALS
HEART RATE: 81 BPM | HEIGHT: 67 IN | OXYGEN SATURATION: 96 % | DIASTOLIC BLOOD PRESSURE: 69 MMHG | SYSTOLIC BLOOD PRESSURE: 133 MMHG | BODY MASS INDEX: 40.97 KG/M2 | WEIGHT: 261 LBS

## 2022-03-18 DIAGNOSIS — F17.210 CIGARETTE SMOKER: ICD-10-CM

## 2022-03-18 DIAGNOSIS — I50.42 CHRONIC COMBINED SYSTOLIC AND DIASTOLIC HEART FAILURE: ICD-10-CM

## 2022-03-18 DIAGNOSIS — I25.5 ISCHEMIC CARDIOMYOPATHY: ICD-10-CM

## 2022-03-18 DIAGNOSIS — E78.5 HYPERLIPIDEMIA, UNSPECIFIED HYPERLIPIDEMIA TYPE: ICD-10-CM

## 2022-03-18 DIAGNOSIS — I10 PRIMARY HYPERTENSION: Primary | ICD-10-CM

## 2022-03-18 PROCEDURE — 99214 OFFICE O/P EST MOD 30 MIN: CPT | Mod: S$GLB,,, | Performed by: STUDENT IN AN ORGANIZED HEALTH CARE EDUCATION/TRAINING PROGRAM

## 2022-03-18 PROCEDURE — 99999 PR PBB SHADOW E&M-EST. PATIENT-LVL IV: ICD-10-PCS | Mod: PBBFAC,,, | Performed by: STUDENT IN AN ORGANIZED HEALTH CARE EDUCATION/TRAINING PROGRAM

## 2022-03-18 PROCEDURE — 99999 PR PBB SHADOW E&M-EST. PATIENT-LVL IV: CPT | Mod: PBBFAC,,, | Performed by: STUDENT IN AN ORGANIZED HEALTH CARE EDUCATION/TRAINING PROGRAM

## 2022-03-18 PROCEDURE — 99214 PR OFFICE/OUTPT VISIT, EST, LEVL IV, 30-39 MIN: ICD-10-PCS | Mod: S$GLB,,, | Performed by: STUDENT IN AN ORGANIZED HEALTH CARE EDUCATION/TRAINING PROGRAM

## 2022-03-18 RX ORDER — HYDROCODONE BITARTRATE AND ACETAMINOPHEN 10; 325 MG/1; MG/1
1 TABLET ORAL EVERY 6 HOURS PRN
COMMUNITY
Start: 2022-02-21

## 2022-03-18 RX ORDER — ROSUVASTATIN CALCIUM 40 MG/1
TABLET, COATED ORAL
Qty: 30 TABLET | Refills: 4 | Status: SHIPPED | OUTPATIENT
Start: 2022-03-18 | End: 2022-08-24 | Stop reason: SDUPTHER

## 2022-03-18 NOTE — PROGRESS NOTES
PCP - Abad Patterson MD  Referring Physician:     Subjective:   Patient ID:  Cody Castro is a 41 y.o. male with past medical history of  CAD s/p PCI (LAD, D1   RCA, OM1 ), HTN, HLD, tobacco use who presents for clinic follow up. Last seen by Dr. Bradley in May 2021. 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 He reports having about 3 of these episodes over the past 4 months. He denies chest pain, PND, palpitations, claudication. Patient states he still smokes a few cigarettes per day. He reports that he can perform his ADL's without issue. He has been taking all of his medications as prescribed except crestor which he ran out of.     History:     Social History     Tobacco Use    Smoking status: Former Smoker     Packs/day: 1.00     Years: 20.00     Pack years: 20.00     Types: Cigarettes     Quit date: 2019     Years since quittin.3    Smokeless tobacco: Never Used   Substance Use Topics    Alcohol use: Not Currently     Comment: occasional. none in 2 months     Family History   Problem Relation Age of Onset    Heart disease Mother     Heart disease Father        Meds:   Review of patient's allergies indicates:  No Known Allergies    Current Outpatient Medications:     clonazePAM (KLONOPIN) 2 MG Tab, Take 1 mg by mouth 2 (two) times daily as needed., Disp: , Rfl:     lisinopril 10 MG tablet, Take 1 tablet (10 mg total) by mouth once daily., Disp: 90 tablet, Rfl: 17    metoprolol succinate (TOPROL-XL) 50 MG 24 hr tablet, Take 1 tablet by mouth once a day, Disp: 30 tablet, Rfl: 0    rivaroxaban (XARELTO) 2.5 mg Tab, Take 1 tablet (2.5 mg total) by mouth 2 (two) times a day., Disp: 60 tablet, Rfl: 11    rosuvastatin (CRESTOR) 40 MG Tab, Take 1 tablet by mouth every evening, Disp: 30 tablet, Rfl: 0    aspirin (ECOTRIN) 81 MG EC tablet, Take 1 tablet (81 mg total) by mouth once daily., Disp: , Rfl: 0     "HYDROcodone-acetaminophen (NORCO)  mg per tablet, Take 1 tablet by mouth every 6 (six) hours as needed., Disp: , Rfl:       Objective:   /69 (BP Location: Left arm, Patient Position: Sitting, BP Method: Medium (Automatic))   Pulse 81   Ht 5' 7" (1.702 m)   Wt 118.4 kg (261 lb 0.4 oz)   SpO2 96%   BMI 40.88 kg/m²     Physical Exam  Gen: No apparent distress, resting comfortably  HEENT: Pupils equal and reactive to light  Cardio: Regular rate, point of maximal impulse not displaced, no murmur noted, 2+ radial pulses bilaterally, 2+ DP pulses bilaterally  Resp: CTAB, no wheezing  Abd: Soft, non-tender, non-distended  Skin: Warm, dry, no peripheral edema noted  Neuro: Alert and oriented x3  Psych: Normal mood and affect      Labs:     Lab Results   Component Value Date     03/16/2022    K 4.1 03/16/2022     03/16/2022    CO2 28 03/16/2022    BUN 17 03/16/2022    CREATININE 0.8 03/16/2022    ANIONGAP 7 (L) 03/16/2022     Lab Results   Component Value Date    HGBA1C 5.1 06/22/2018     Lab Results   Component Value Date    BNP 11 03/16/2022    BNP 24 05/03/2021    BNP 25 08/22/2020       Lab Results   Component Value Date    WBC 8.38 03/16/2022    HGB 13.6 (L) 03/16/2022    HCT 40.9 03/16/2022    HCT 46 09/23/2016     03/16/2022    GRAN 5.6 03/16/2022    GRAN 67.2 03/16/2022     Lab Results   Component Value Date    CHOL 135 05/21/2021    HDL 25 (L) 05/21/2021    LDLCALC 63.4 05/21/2021    TRIG 233 (H) 05/21/2021       Lab Results   Component Value Date     03/16/2022    K 4.1 03/16/2022     03/16/2022    CO2 28 03/16/2022    BUN 17 03/16/2022    CREATININE 0.8 03/16/2022    ANIONGAP 7 (L) 03/16/2022     Lab Results   Component Value Date    HGBA1C 5.1 06/22/2018     Lab Results   Component Value Date    BNP 11 03/16/2022    BNP 24 05/03/2021    BNP 25 08/22/2020    Lab Results   Component Value Date    WBC 8.38 03/16/2022    HGB 13.6 (L) 03/16/2022    HCT 40.9 03/16/2022    " HCT 46 09/23/2016     03/16/2022    GRAN 5.6 03/16/2022    GRAN 67.2 03/16/2022     Lab Results   Component Value Date    CHOL 135 05/21/2021    HDL 25 (L) 05/21/2021    LDLCALC 63.4 05/21/2021    TRIG 233 (H) 05/21/2021                Cardiovascular Imaging:     Echo:   EF   Date Value Ref Range Status   04/21/2021 50 % Final     Nuc Stress EF   Date Value Ref Range Status   10/18/2019 45 % Final     Nuc Rest EF   Date Value Ref Range Status   10/18/2019 40.0  Final       Assessment & Plan:     1. CAD s/p PCI (LAD, D1 2013  RCA, OM1 7/18)  -Continue ASA 81 mg and Xarelto 2.5 mg BID  -Continue high intensity statin, will refill today    2. Lightheadedness/shortness of breath  -Will repeat Echo in one month  -Consider discontinuing Klonopin and Norco if able  -Keep BP log, adjust BP meds if hypotensive    3. HF with recovered EF  -Last EF 50% in April 2021  -Continue metoprolol succinate 50 mg qd and lisinopril 10 mg qd    4. HTN  -Continue metoprolol and lisinopril as above    5. HLD  -Check lipid panel  -Refill Crestor    Case staffed with Dr. Aguilar. RTC in 3 months or sooner if needed.          Signed:  Olayinka Berkowitz MD  [facility] PGY-4    Staff attestation to follow.

## 2022-03-21 NOTE — PROGRESS NOTES
I have seen the patient, reviewed the Fellow's history and physical, assessment, and plan. I have personally interviewed and examined the patient at bedside and agree with the findings.     Topher Aguilar MD FACC [facility] [place]

## 2022-03-25 ENCOUNTER — LAB VISIT (OUTPATIENT)
Dept: LAB | Facility: HOSPITAL | Age: 42
End: 2022-03-25
Payer: COMMERCIAL

## 2022-03-25 ENCOUNTER — PATIENT MESSAGE (OUTPATIENT)
Dept: CARDIOLOGY | Facility: CLINIC | Age: 42
End: 2022-03-25
Payer: COMMERCIAL

## 2022-03-25 DIAGNOSIS — I25.5 ISCHEMIC CARDIOMYOPATHY: ICD-10-CM

## 2022-03-25 DIAGNOSIS — E78.5 HYPERLIPIDEMIA, UNSPECIFIED HYPERLIPIDEMIA TYPE: ICD-10-CM

## 2022-03-25 LAB
CHOLEST SERPL-MCNC: 163 MG/DL (ref 120–199)
CHOLEST/HDLC SERPL: 5.8 {RATIO} (ref 2–5)
HDLC SERPL-MCNC: 28 MG/DL (ref 40–75)
HDLC SERPL: 17.2 % (ref 20–50)
LDLC SERPL CALC-MCNC: 109.8 MG/DL (ref 63–159)
NONHDLC SERPL-MCNC: 135 MG/DL
TRIGL SERPL-MCNC: 126 MG/DL (ref 30–150)

## 2022-03-25 PROCEDURE — 80061 LIPID PANEL: CPT | Performed by: STUDENT IN AN ORGANIZED HEALTH CARE EDUCATION/TRAINING PROGRAM

## 2022-03-25 PROCEDURE — 36415 COLL VENOUS BLD VENIPUNCTURE: CPT | Performed by: STUDENT IN AN ORGANIZED HEALTH CARE EDUCATION/TRAINING PROGRAM

## 2022-04-18 ENCOUNTER — HOSPITAL ENCOUNTER (OUTPATIENT)
Dept: CARDIOLOGY | Facility: HOSPITAL | Age: 42
Discharge: HOME | End: 2022-04-18
Attending: STUDENT IN AN ORGANIZED HEALTH CARE EDUCATION/TRAINING PROGRAM
Payer: COMMERCIAL

## 2022-04-18 ENCOUNTER — PATIENT MESSAGE (OUTPATIENT)
Dept: CARDIAC CATH/INVASIVE PROCEDURES | Facility: HOSPITAL | Age: 42
End: 2022-04-18
Payer: COMMERCIAL

## 2022-04-18 VITALS
WEIGHT: 251 LBS | BODY MASS INDEX: 39.39 KG/M2 | SYSTOLIC BLOOD PRESSURE: 120 MMHG | DIASTOLIC BLOOD PRESSURE: 70 MMHG | HEIGHT: 67 IN | HEART RATE: 64 BPM

## 2022-04-18 DIAGNOSIS — I25.5 ISCHEMIC CARDIOMYOPATHY: ICD-10-CM

## 2022-04-18 LAB
ASCENDING AORTA: 3.06 CM
AV INDEX (PROSTH): 0.99
AV MEAN GRADIENT: 5 MMHG
AV PEAK GRADIENT: 9 MMHG
AV VALVE AREA: 4.83 CM2
AV VELOCITY RATIO: 0.9
BSA FOR ECHO PROCEDURE: 2.32 M2
CV ECHO LV RWT: 0.26 CM
DOP CALC AO PEAK VEL: 1.5 M/S
DOP CALC AO VTI: 31.16 CM
DOP CALC LVOT AREA: 4.9 CM2
DOP CALC LVOT DIAMETER: 2.49 CM
DOP CALC LVOT PEAK VEL: 1.35 M/S
DOP CALC LVOT STROKE VOLUME: 150.64 CM3
DOP CALCLVOT PEAK VEL VTI: 30.95 CM
E WAVE DECELERATION TIME: 178.58 MSEC
E/A RATIO: 1.68
E/E' RATIO: 10.11 M/S
ECHO LV POSTERIOR WALL: 0.74 CM (ref 0.6–1.1)
EJECTION FRACTION: 55 %
FRACTIONAL SHORTENING: 27 % (ref 28–44)
INTERVENTRICULAR SEPTUM: 1.06 CM (ref 0.6–1.1)
LA MAJOR: 4.94 CM
LA MINOR: 4.39 CM
LA WIDTH: 3.36 CM
LEFT ATRIUM SIZE: 4.21 CM
LEFT ATRIUM VOLUME INDEX MOD: 17 ML/M2
LEFT ATRIUM VOLUME INDEX: 25.1 ML/M2
LEFT ATRIUM VOLUME MOD: 37.81 CM3
LEFT ATRIUM VOLUME: 55.9 CM3
LEFT INTERNAL DIMENSION IN SYSTOLE: 4.16 CM (ref 2.1–4)
LEFT VENTRICLE DIASTOLIC VOLUME INDEX: 72.73 ML/M2
LEFT VENTRICLE DIASTOLIC VOLUME: 162.19 ML
LEFT VENTRICLE MASS INDEX: 89 G/M2
LEFT VENTRICLE SYSTOLIC VOLUME INDEX: 34.5 ML/M2
LEFT VENTRICLE SYSTOLIC VOLUME: 77.03 ML
LEFT VENTRICULAR INTERNAL DIMENSION IN DIASTOLE: 5.73 CM (ref 3.5–6)
LEFT VENTRICULAR MASS: 199.3 G
LV LATERAL E/E' RATIO: 10.67 M/S
LV SEPTAL E/E' RATIO: 9.6 M/S
MV A" WAVE DURATION": 11.99 MSEC
MV PEAK A VEL: 0.57 M/S
MV PEAK E VEL: 0.96 M/S
MV STENOSIS PRESSURE HALF TIME: 51.79 MS
MV VALVE AREA P 1/2 METHOD: 4.25 CM2
PULM VEIN S/D RATIO: 1.11
PV PEAK D VEL: 0.38 M/S
PV PEAK S VEL: 0.42 M/S
RA MAJOR: 4.35 CM
RA PRESSURE: 3 MMHG
RA WIDTH: 3.1 CM
RIGHT VENTRICULAR END-DIASTOLIC DIMENSION: 3.32 CM
RV TISSUE DOPPLER FREE WALL SYSTOLIC VELOCITY 1 (APICAL 4 CHAMBER VIEW): 11.71 CM/S
SINUS: 2.92 CM
STJ: 2.73 CM
TDI LATERAL: 0.09 M/S
TDI SEPTAL: 0.1 M/S
TDI: 0.1 M/S
TRICUSPID ANNULAR PLANE SYSTOLIC EXCURSION: 2.14 CM

## 2022-04-18 PROCEDURE — 93306 TTE W/DOPPLER COMPLETE: CPT

## 2022-04-18 PROCEDURE — 93306 ECHO (CUPID ONLY): ICD-10-PCS | Mod: 26,,, | Performed by: INTERNAL MEDICINE

## 2022-04-18 PROCEDURE — 93306 TTE W/DOPPLER COMPLETE: CPT | Mod: 26,,, | Performed by: INTERNAL MEDICINE

## 2022-04-20 NOTE — PROGRESS NOTES
Looks like he hasn't read your patient portal message, do you mind giving him a call?  Thanks!  Topher

## 2022-04-25 ENCOUNTER — HOSPITAL ENCOUNTER (EMERGENCY)
Facility: HOSPITAL | Age: 42
Discharge: HOME | End: 2022-04-25
Attending: EMERGENCY MEDICINE
Payer: COMMERCIAL

## 2022-04-25 VITALS
TEMPERATURE: 98 F | OXYGEN SATURATION: 97 % | WEIGHT: 255 LBS | BODY MASS INDEX: 40.98 KG/M2 | HEART RATE: 93 BPM | RESPIRATION RATE: 20 BRPM | HEIGHT: 66 IN | SYSTOLIC BLOOD PRESSURE: 156 MMHG | DIASTOLIC BLOOD PRESSURE: 92 MMHG

## 2022-04-25 DIAGNOSIS — S93.401A MODERATE RIGHT ANKLE SPRAIN, INITIAL ENCOUNTER: Primary | ICD-10-CM

## 2022-04-25 PROCEDURE — 99284 PR EMERGENCY DEPT VISIT,LEVEL IV: ICD-10-PCS | Mod: ,,, | Performed by: EMERGENCY MEDICINE

## 2022-04-25 PROCEDURE — 99284 EMERGENCY DEPT VISIT MOD MDM: CPT | Mod: ,,, | Performed by: EMERGENCY MEDICINE

## 2022-04-25 PROCEDURE — 99283 EMERGENCY DEPT VISIT LOW MDM: CPT

## 2022-04-25 RX ORDER — METOPROLOL SUCCINATE 50 MG/1
TABLET, EXTENDED RELEASE ORAL
Qty: 30 TABLET | Refills: 0 | Status: SHIPPED | OUTPATIENT
Start: 2022-04-25 | End: 2022-06-02 | Stop reason: SDUPTHER

## 2022-04-25 NOTE — ED PROVIDER NOTES
Source of History   Patient    Chief Complaint   Foot Pain (Pt c/o sharp R foot pain starting Saturday that worsens with bearing weight. R lateral ankle swelling noted. )      History Of Present Illness   Cody Castro is a 41 y.o. male presenting with right ankle swelling and pain started 2 days ago.  Patient states he thinks he stepped on it wrong.  He has been able to bear weight with a slight limp.  Notes a old fracture in that ankle from years ago.  He wanted to make sure that it was not broken again.    Review Of Systems   As per HPI and below:  General: No fever.  No chills.  Extremities: Notes right ankle pain.  Integument: No rashes or bruising.  Neurologic: No numbness.  No focal weakness.        Review of patient's allergies indicates:  No Known Allergies    No current facility-administered medications on file prior to encounter.     Current Outpatient Medications on File Prior to Encounter   Medication Sig Dispense Refill    lisinopril 10 MG tablet Take 1 tablet (10 mg total) by mouth once daily. 90 tablet 17    metoprolol succinate (TOPROL-XL) 50 MG 24 hr tablet Take 1 tablet by mouth once a day 30 tablet 0    rosuvastatin (CRESTOR) 40 MG Tab Take 1 tablet by mouth every evening 30 tablet 4    aspirin (ECOTRIN) 81 MG EC tablet Take 1 tablet (81 mg total) by mouth once daily.  0    clonazePAM (KLONOPIN) 2 MG Tab Take 1 mg by mouth 2 (two) times daily as needed.      HYDROcodone-acetaminophen (NORCO)  mg per tablet Take 1 tablet by mouth every 6 (six) hours as needed.      rivaroxaban (XARELTO) 2.5 mg Tab Take 1 tablet (2.5 mg total) by mouth 2 (two) times a day. 60 tablet 11       Past History   As per HPI and below:  Past Medical History:   Diagnosis Date    Anxiety     Carbon monoxide poisoning     Caused a seizure    Chronic systolic congestive heart failure 11/15/2019    Coronary artery disease     History of heart artery stent 2013    Hypertension     Obesity      Past Surgical  "History:   Procedure Laterality Date    CORONARY ANGIOPLASTY      LEFT HEART CATHETERIZATION Right 2018    Procedure: Left heart cath;  Surgeon: Dieter Vargas MD;  Location: [facility] CATH LAB;  Service: Cardiology;  Laterality: Right;       Social History     Socioeconomic History    Marital status:    Tobacco Use    Smoking status: Former Smoker     Packs/day: 1.00     Years: 20.00     Pack years: 20.00     Types: Cigarettes     Quit date: 2019     Years since quittin.4    Smokeless tobacco: Never Used   Substance and Sexual Activity    Alcohol use: Not Currently     Comment: occasional. none in 2 months    Drug use: Yes     Types: Marijuana    Sexual activity: Yes     Partners: Female       Family History   Problem Relation Age of Onset    Heart disease Mother     Heart disease Father        Physical Exam     Vitals:    22 0545   BP: (!) 156/92   Pulse: 93   Resp: 20   Temp: 98.4 °F (36.9 °C)   TempSrc: Oral   SpO2: 97%   Weight: 115.7 kg (255 lb)   Height: 5' 6" (1.676 m)     Appearance: No acute distress.  Head: Atraumatic.  Integument: No ecchymoses or other signs of trauma.  Musculoskeletal:  Significant swelling with mild tenderness over the lateral malleolus of the right ankle.  No tenderness in the foot.  Peripheral vascular:  Intact capillary refill and dorsalis pedis pulses distal to the injury.  Neurologic: Motor intact.  Sensation intact.    Mental status: Alert and oriented x 3.  GCS 15.      Initial MDM   Likely ankle sprain with some lateral malleolar tenderness.  This patient is ambulating but notes old fracture.  Given the swelling and patient concerns, will check x-ray.  Patient does not want any pain medicine at this time.    I decided to obtain the patient's medical records.    Medications - No data to display    Results and Course   Labs Reviewed - No data to display    Imaging Results          X-Ray Ankle Complete Right (Final result)  Result time 22 " 07:35:56    Final result by Dieter Nielsen MD (04/25/22 07:35:56)                 Impression:      No convincing evidence of acute fracture or dislocation.      Electronically signed by: Dieter Nielsen  Date:    04/25/2022  Time:    07:35             Narrative:    EXAMINATION:  XR ANKLE COMPLETE 3 VIEW RIGHT    CLINICAL HISTORY:  Unspecified injury of right ankle, initial encounter    TECHNIQUE:  AP, lateral, and oblique images of the right ankle were performed.    COMPARISON:  None    FINDINGS:  No definite evidence of acute fracture or dislocation.  Well corticated os ossific fragments adjacent to the anterior margins of the lateral and medial malleoli may relate to remote trauma.  Talar dome appears intact.  Ankle mortise symmetric.  No findings of syndesmotic widening.    Degenerative spurring dorsal midfoot.  Enthesopathic change at the calcaneus.  No definite radiopaque foreign body.                                ED Course as of 04/25/22 0747   Mon Apr 25, 2022   0718 X-Ray Ankle Complete Right  No acute fractures seen per my independent interpretation.     [DC]      ED Course User Index  [DC] Justino Goff MD           MDM, Impression and Plan   41 y.o. male with right ankle sprain, no acute fracture seen, well corticated bony fragment noted, likely from old fracture.  Air splint given.  OTC pain meds PRN.           Final diagnoses:  [S93.401A] Moderate right ankle sprain, initial encounter (Primary)          ED Disposition Condition    Discharge Stable        ED Prescriptions     None        Follow-up Information     Follow up With Specialties Details Why Contact Info    Abad Patterson MD Internal Medicine In 2 weeks As needed [address] [facility] [id number]  [phone]             Justino Goff MD  04/25/22 0757

## 2022-04-25 NOTE — Clinical Note
"Cody Keating (James)ox was seen and treated in our emergency department on 4/25/2022.  He may return with limitations on 05/02/2022.       Sincerely,      Estelle Verduzco RN    "

## 2022-04-25 NOTE — ED TRIAGE NOTES
"Cody Castro, an 41 y.o. male presents to the ED from home.  C/o burning pain in his R ankle at a 4/10 when weight bearing after it "gave out" while walking Saturday afternoon.  Ankle is swollen.      Chief Complaint   Patient presents with    Foot Pain     Pt c/o sharp R foot pain starting Saturday that worsens with bearing weight. R lateral ankle swelling noted.      Review of patient's allergies indicates:  No Known Allergies  Past Medical History:   Diagnosis Date    Anxiety     Carbon monoxide poisoning     Caused a seizure    Chronic systolic congestive heart failure 11/15/2019    Coronary artery disease     History of heart artery stent 2013    Hypertension     Obesity      "

## 2022-04-25 NOTE — Clinical Note
"Cody Kendall" Matthew was seen and treated in our emergency department on 4/25/2022.  He may return to work on 05/02/2022.  May return to work on 04/27 with light duty, or resume normally on Monday, May 2 without restrictions.     If you have any questions or concerns, please don't hesitate to call.      Estelle Verduzco RN    "

## 2022-05-06 DIAGNOSIS — I25.10 CORONARY ARTERY DISEASE INVOLVING NATIVE CORONARY ARTERY OF NATIVE HEART WITHOUT ANGINA PECTORIS: ICD-10-CM

## 2022-06-24 ENCOUNTER — OFFICE VISIT (OUTPATIENT)
Dept: CARDIOLOGY | Facility: CLINIC | Age: 42
End: 2022-06-24
Payer: COMMERCIAL

## 2022-06-24 VITALS
DIASTOLIC BLOOD PRESSURE: 79 MMHG | BODY MASS INDEX: 39.79 KG/M2 | HEIGHT: 66 IN | SYSTOLIC BLOOD PRESSURE: 127 MMHG | WEIGHT: 247.56 LBS | HEART RATE: 91 BPM

## 2022-06-24 DIAGNOSIS — E78.5 HYPERLIPIDEMIA, UNSPECIFIED HYPERLIPIDEMIA TYPE: ICD-10-CM

## 2022-06-24 DIAGNOSIS — I25.5 ISCHEMIC CARDIOMYOPATHY: ICD-10-CM

## 2022-06-24 DIAGNOSIS — I10 PRIMARY HYPERTENSION: Primary | ICD-10-CM

## 2022-06-24 PROCEDURE — 99999 PR PBB SHADOW E&M-EST. PATIENT-LVL III: ICD-10-PCS | Mod: PBBFAC,,, | Performed by: STUDENT IN AN ORGANIZED HEALTH CARE EDUCATION/TRAINING PROGRAM

## 2022-06-24 PROCEDURE — 99999 PR PBB SHADOW E&M-EST. PATIENT-LVL III: CPT | Mod: PBBFAC,,, | Performed by: STUDENT IN AN ORGANIZED HEALTH CARE EDUCATION/TRAINING PROGRAM

## 2022-06-24 PROCEDURE — 99214 PR OFFICE/OUTPT VISIT, EST, LEVL IV, 30-39 MIN: ICD-10-PCS | Mod: S$GLB,,, | Performed by: STUDENT IN AN ORGANIZED HEALTH CARE EDUCATION/TRAINING PROGRAM

## 2022-06-24 PROCEDURE — 99214 OFFICE O/P EST MOD 30 MIN: CPT | Mod: S$GLB,,, | Performed by: STUDENT IN AN ORGANIZED HEALTH CARE EDUCATION/TRAINING PROGRAM

## 2022-06-24 RX ORDER — METOPROLOL SUCCINATE 100 MG/1
100 TABLET, EXTENDED RELEASE ORAL DAILY
Qty: 30 TABLET | Refills: 11 | Status: SHIPPED | OUTPATIENT
Start: 2022-06-24 | End: 2023-06-13 | Stop reason: SDUPTHER

## 2022-06-24 RX ORDER — EZETIMIBE 10 MG/1
10 TABLET ORAL DAILY
Qty: 90 TABLET | Refills: 3 | Status: ON HOLD | OUTPATIENT
Start: 2022-06-24 | End: 2024-03-19

## 2022-08-24 DIAGNOSIS — E78.5 HYPERLIPIDEMIA, UNSPECIFIED HYPERLIPIDEMIA TYPE: ICD-10-CM

## 2022-08-25 RX ORDER — ROSUVASTATIN CALCIUM 40 MG/1
TABLET, COATED ORAL
Qty: 90 TABLET | Refills: 3 | Status: SHIPPED | OUTPATIENT
Start: 2022-08-25 | End: 2023-06-02 | Stop reason: SDUPTHER

## 2022-09-20 ENCOUNTER — HOSPITAL ENCOUNTER (EMERGENCY)
Facility: HOSPITAL | Age: 42
Discharge: HOME | End: 2022-09-20
Attending: EMERGENCY MEDICINE
Payer: COMMERCIAL

## 2022-09-20 VITALS
HEART RATE: 62 BPM | OXYGEN SATURATION: 99 % | BODY MASS INDEX: 39.96 KG/M2 | WEIGHT: 247.56 LBS | TEMPERATURE: 98 F | SYSTOLIC BLOOD PRESSURE: 115 MMHG | RESPIRATION RATE: 18 BRPM | DIASTOLIC BLOOD PRESSURE: 61 MMHG

## 2022-09-20 DIAGNOSIS — R06.02 SHORTNESS OF BREATH: Primary | ICD-10-CM

## 2022-09-20 DIAGNOSIS — R42 DIZZINESS: ICD-10-CM

## 2022-09-20 LAB
ALBUMIN SERPL BCP-MCNC: 4.3 G/DL (ref 3.5–5.2)
ALP SERPL-CCNC: 73 U/L (ref 55–135)
ALT SERPL W/O P-5'-P-CCNC: 34 U/L (ref 10–44)
ANION GAP SERPL CALC-SCNC: 11 MMOL/L (ref 8–16)
AST SERPL-CCNC: 32 U/L (ref 10–40)
BASOPHILS # BLD AUTO: 0.04 K/UL (ref 0–0.2)
BASOPHILS NFR BLD: 0.6 % (ref 0–1.9)
BILIRUB SERPL-MCNC: 0.5 MG/DL (ref 0.1–1)
BNP SERPL-MCNC: 49 PG/ML (ref 0–99)
BUN SERPL-MCNC: 12 MG/DL (ref 6–20)
CALCIUM SERPL-MCNC: 9.4 MG/DL (ref 8.7–10.5)
CHLORIDE SERPL-SCNC: 106 MMOL/L (ref 95–110)
CO2 SERPL-SCNC: 22 MMOL/L (ref 23–29)
CREAT SERPL-MCNC: 1 MG/DL (ref 0.5–1.4)
DIFFERENTIAL METHOD: ABNORMAL
EOSINOPHIL # BLD AUTO: 0.2 K/UL (ref 0–0.5)
EOSINOPHIL NFR BLD: 2.4 % (ref 0–8)
ERYTHROCYTE [DISTWIDTH] IN BLOOD BY AUTOMATED COUNT: 13 % (ref 11.5–14.5)
EST. GFR  (NO RACE VARIABLE): >60 ML/MIN/1.73 M^2
GLUCOSE SERPL-MCNC: 106 MG/DL (ref 70–110)
HCT VFR BLD AUTO: 42.2 % (ref 40–54)
HCV AB SERPL QL IA: NORMAL
HGB BLD-MCNC: 14.3 G/DL (ref 14–18)
HIV 1+2 AB+HIV1 P24 AG SERPL QL IA: NORMAL
IMM GRANULOCYTES # BLD AUTO: 0.02 K/UL (ref 0–0.04)
IMM GRANULOCYTES NFR BLD AUTO: 0.3 % (ref 0–0.5)
LYMPHOCYTES # BLD AUTO: 1.4 K/UL (ref 1–4.8)
LYMPHOCYTES NFR BLD: 19.3 % (ref 18–48)
MCH RBC QN AUTO: 31.8 PG (ref 27–31)
MCHC RBC AUTO-ENTMCNC: 33.9 G/DL (ref 32–36)
MCV RBC AUTO: 94 FL (ref 82–98)
MONOCYTES # BLD AUTO: 0.7 K/UL (ref 0.3–1)
MONOCYTES NFR BLD: 9.2 % (ref 4–15)
NEUTROPHILS # BLD AUTO: 4.8 K/UL (ref 1.8–7.7)
NEUTROPHILS NFR BLD: 68.2 % (ref 38–73)
NRBC BLD-RTO: 0 /100 WBC
PLATELET # BLD AUTO: 182 K/UL (ref 150–450)
PMV BLD AUTO: 10.5 FL (ref 9.2–12.9)
POTASSIUM SERPL-SCNC: 4.5 MMOL/L (ref 3.5–5.1)
PROT SERPL-MCNC: 7.1 G/DL (ref 6–8.4)
RBC # BLD AUTO: 4.5 M/UL (ref 4.6–6.2)
SODIUM SERPL-SCNC: 139 MMOL/L (ref 136–145)
TROPONIN I SERPL DL<=0.01 NG/ML-MCNC: <0.006 NG/ML (ref 0–0.03)
WBC # BLD AUTO: 7.09 K/UL (ref 3.9–12.7)

## 2022-09-20 PROCEDURE — 80053 COMPREHEN METABOLIC PANEL: CPT | Performed by: PHYSICIAN ASSISTANT

## 2022-09-20 PROCEDURE — 93010 EKG 12-LEAD: ICD-10-PCS | Mod: ,,, | Performed by: INTERNAL MEDICINE

## 2022-09-20 PROCEDURE — 99284 PR EMERGENCY DEPT VISIT,LEVEL IV: ICD-10-PCS | Mod: ,,, | Performed by: PHYSICIAN ASSISTANT

## 2022-09-20 PROCEDURE — 83880 ASSAY OF NATRIURETIC PEPTIDE: CPT | Performed by: PHYSICIAN ASSISTANT

## 2022-09-20 PROCEDURE — 93010 ELECTROCARDIOGRAM REPORT: CPT | Mod: ,,, | Performed by: INTERNAL MEDICINE

## 2022-09-20 PROCEDURE — 99285 EMERGENCY DEPT VISIT HI MDM: CPT | Mod: 25

## 2022-09-20 PROCEDURE — 99284 EMERGENCY DEPT VISIT MOD MDM: CPT | Mod: ,,, | Performed by: PHYSICIAN ASSISTANT

## 2022-09-20 PROCEDURE — 85025 COMPLETE CBC W/AUTO DIFF WBC: CPT | Performed by: PHYSICIAN ASSISTANT

## 2022-09-20 PROCEDURE — 84484 ASSAY OF TROPONIN QUANT: CPT | Performed by: PHYSICIAN ASSISTANT

## 2022-09-20 PROCEDURE — 87389 HIV-1 AG W/HIV-1&-2 AB AG IA: CPT | Performed by: PHYSICIAN ASSISTANT

## 2022-09-20 PROCEDURE — 93005 ELECTROCARDIOGRAM TRACING: CPT

## 2022-09-20 PROCEDURE — 86803 HEPATITIS C AB TEST: CPT | Performed by: PHYSICIAN ASSISTANT

## 2022-09-20 NOTE — Clinical Note
"Cody Kendall"Matthew was seen and treated in our emergency department on 9/20/2022.  He may return to work on 09/20/2022.       If you have any questions or concerns, please don't hesitate to call.      MELISSA Durán PA-C/ GINI Klein    "

## 2022-09-20 NOTE — ED NOTES
Patient identifiers verified and correct for Cody Castro  LOC: The patient is awake, alert and aware of environment with an appropriate affect, the patient is oriented x 3 and speaking appropriately.   APPEARANCE: Patient appears comfortable and in no acute distress, patient is clean and well groomed.  SKIN: The skin is warm and dry, color consistent with ethnicity, patient has normal skin turgor and moist mucus membranes, skin intact, no breakdown or bruising noted.   MUSCULOSKELETAL: Patient moving all extremities spontaneously, no swelling noted.  RESPIRATORY: Airway is open and patent, respirations are spontaneous, patient has a normal effort and rate, no accessory muscle use noted, pt placed on continuous pulse ox with O2 sats noted at 97% on room air.  CARDIAC: Pt placed on cardiac monitor. Patient has a normal rate and regular rhythm, no edema noted, capillary refill < 3 seconds.   GASTRO: Soft and non tender to palpation, no distention noted, normoactive bowel sounds present in all four quadrants. Pt states bowel movements have been regular.  : Pt denies any pain or frequency with urination.  NEURO: Pt opens eyes spontaneously, behavior appropriate to situation, follows commands, facial expression symmetrical, bilateral hand grasp equal and even, purposeful motor response noted, normal sensation in all extremities when touched with a finger. Pt reports intermittent dizziness x2 months

## 2022-09-20 NOTE — Clinical Note
"Cody Kendall"Matthew was seen and treated in our emergency department on 9/20/2022.  He may return to work on 09/21/2022.       If you have any questions or concerns, please don't hesitate to call.      MELISSA Durán PA-C/ GINI Klein    "

## 2022-09-20 NOTE — ED TRIAGE NOTES
Dizziness (Pt. States intermittent episodes of dizziness X a few months. Pt. Denies ever having a syncopal episode. States the last time it happened was yesterday while at work. Pt. Reports he works outside. )

## 2022-09-20 NOTE — ED PROVIDER NOTES
Encounter Date: 2022       History     Chief Complaint   Patient presents with    Dizziness     Pt. States intermittent episodes of dizziness X a few months. Pt. Denies ever having a syncopal episode. States the last time it happened was yesterday while at work. Pt. Reports he works outside.      41-year-old male with medical comorbidities significant for CHF, CAD with coronary stents in place, hypertension, obesity, anxiety, tobacco use presents to the ED for evaluation of an episode of shortness of breath and lightheadedness yesterday.  Patient was digging a hole in the mud to replace a water main when he began to feel short of breath and lightheaded.  Patient states that his legs also felt a little shaky.  He states that this resolved after about 5 minutes of rest.  Denies chest pain, excessive diaphoresis, nausea at the time.  Patient denies any of the symptoms at this time.  He wanted to get checked out before returning to work.  Denies leg swelling, fever, calf pain.  Patient has smoked 1 ppd for many years.     Review of patient's allergies indicates:  No Known Allergies  Past Medical History:   Diagnosis Date    Anxiety     Carbon monoxide poisoning     Caused a seizure    Chronic systolic congestive heart failure 11/15/2019    Coronary artery disease     History of heart artery stent     Hypertension     Obesity      Past Surgical History:   Procedure Laterality Date    CORONARY ANGIOPLASTY      LEFT HEART CATHETERIZATION Right 2018    Procedure: Left heart cath;  Surgeon: Dieter Vargas MD;  Location: [facility] CATH LAB;  Service: Cardiology;  Laterality: Right;     Family History   Problem Relation Age of Onset    Heart disease Mother     Heart disease Father      Social History     Tobacco Use    Smoking status: Former     Packs/day: 1.00     Years: 20.00     Pack years: 20.00     Types: Cigarettes     Quit date: 2019     Years since quittin.8    Smokeless tobacco: Never   Substance  Use Topics    Alcohol use: Not Currently     Comment: occasional. none in 2 months    Drug use: Yes     Types: Marijuana     Review of Systems   Constitutional:  Negative for fever.   HENT:  Negative for sore throat.    Respiratory:  Positive for shortness of breath.    Cardiovascular:  Negative for chest pain.   Gastrointestinal:  Negative for nausea.   Genitourinary:  Negative for dysuria.   Musculoskeletal:  Negative for back pain.   Skin:  Negative for rash.   Neurological:  Positive for light-headedness. Negative for weakness.   Hematological:  Does not bruise/bleed easily.     Physical Exam     Initial Vitals [09/20/22 0716]   BP Pulse Resp Temp SpO2   115/61 79 16 98.6 °F (37 °C) 96 %      MAP       --         Physical Exam    Nursing note and vitals reviewed.  Constitutional: He appears well-developed and well-nourished.  Non-toxic appearance. He does not appear ill. No distress.   HENT:   Head: Normocephalic and atraumatic.   Neck: Neck supple.   Normal range of motion.  Cardiovascular:  Normal rate and regular rhythm.     Exam reveals no gallop, no distant heart sounds and no friction rub.       No murmur heard.  Pulmonary/Chest: Effort normal and breath sounds normal. No accessory muscle usage. No tachypnea. No respiratory distress. He has no decreased breath sounds. He has no wheezes. He has no rhonchi. He has no rales.   Abdominal: He exhibits no distension.   Musculoskeletal:      Cervical back: Normal range of motion and neck supple.     Neurological: He is alert.   Skin: No rash noted.       ED Course   Procedures  Labs Reviewed   CBC W/ AUTO DIFFERENTIAL - Abnormal; Notable for the following components:       Result Value    RBC 4.50 (*)     MCH 31.8 (*)     All other components within normal limits   COMPREHENSIVE METABOLIC PANEL - Abnormal; Notable for the following components:    CO2 22 (*)     All other components within normal limits   HIV 1 / 2 ANTIBODY    Narrative:     Release to  patient->Immediate   HEPATITIS C ANTIBODY    Narrative:     Release to patient->Immediate   B-TYPE NATRIURETIC PEPTIDE   TROPONIN I        ECG Results              EKG 12-lead (Final result)  Result time 09/20/22 16:26:19      Final result by Interface, Lab In [facility] (09/20/22 16:26:19)                   Narrative:    Test Reason : R42,    Vent. Rate : 064 BPM     Atrial Rate : 064 BPM     P-R Int : 160 ms          QRS Dur : 094 ms      QT Int : 376 ms       P-R-T Axes : 027 087 064 degrees     QTc Int : 387 ms    Normal sinus rhythm  Anteroseptal infarct (cited on or before 22-AUG-2020)  Abnormal ECG  When compared with ECG of 16-MAR-2022 12:56,  No significant change was found  Confirmed by MAGDIEL WARREN MD ([id number]) on 9/20/2022 4:26:10 PM    Referred By: BRODY   SELF           Confirmed By:MAGDIEL WARREN MD                                  Imaging Results              X-Ray Chest 1 View (Final result)  Result time 09/20/22 08:43:16      Final result by Gatito Ross MD (09/20/22 08:43:16)                   Impression:      No acute abnormality.      Electronically signed by: Gatito Ross MD  Date:    09/20/2022  Time:    08:43               Narrative:    EXAMINATION:  XR CHEST 1 VIEW    CLINICAL HISTORY:  Shortness of breath    TECHNIQUE:  Single frontal view of the chest was performed.    COMPARISON:  03/16/2022    FINDINGS:  The lungs are clear, with normal appearance of pulmonary vasculature and no pleural effusion or pneumothorax.    The cardiac silhouette is normal in size. The hilar and mediastinal contours are unremarkable.    Visualized osseous structures show no acute abnormalities.                                       Medications - No data to display  Medical Decision Making:   History:   Old Medical Records: I decided to obtain old medical records.  Initial Assessment:   41-year-old male presents to the ED with a brief episode of lightheadedness and shortness of breath yesterday.   Hemodynamically stable.  Afebrile.  No acute distress.  Differential Diagnosis:   My differential diagnosis includes but is not limited to:  Heat exposure, vasovagal episode, atypical ACS, hypoglycemia  Independently Interpreted Test(s):   I have ordered and independently interpreted EKG Reading(s) - see summary below       <> Summary of EKG Reading(s): Normal sinus rhythm with a rate of 64.  Clinical Tests:   Lab Tests: Ordered  Radiological Study: Ordered  Medical Tests: Ordered  ED Management:  No concerning hematologic or metabolic abnormalities.  Troponin is negative.  Chest x-ray negative for acute process.  Given patient's brief symptoms, doubt PE.  Patient is also PERC negative.  Unclear etiology of patient's symptoms yesterday, but feel that possible he exposure or hypoglycemia or vasovagal episode is likely.  Feel that he is stable for discharge.  Advised that he follow-up with his primary doctor if episodes recur.  ED return precautions given.                          Clinical Impression:   Final diagnoses:  [R42] Dizziness  [R06.02] Shortness of breath (Primary)        ED Disposition Condition    Discharge Stable          ED Prescriptions    None       Follow-up Information       Follow up With Specialties Details Why Contact Info    Abad Patterson MD Internal Medicine Schedule an appointment as soon as possible for a visit  For reevaluation, If symptoms do not improve [address]  [place] [address]  [phone]               Carmen Durán PA-C  09/20/22 [id number]

## 2022-09-20 NOTE — DISCHARGE INSTRUCTIONS
Your test did not show any concerning abnormalities with your heart or lungs or any other concerning findings.  You may have been overheated while working yesterday.    Follow-up with your primary doctor if these episodes recur.      Return to the ER immediately for any new or significantly worsening symptoms.

## 2022-10-10 ENCOUNTER — HOSPITAL ENCOUNTER (EMERGENCY)
Facility: HOSPITAL | Age: 42
Discharge: HOME | End: 2022-10-10
Attending: EMERGENCY MEDICINE
Payer: COMMERCIAL

## 2022-10-10 VITALS
OXYGEN SATURATION: 96 % | DIASTOLIC BLOOD PRESSURE: 72 MMHG | TEMPERATURE: 99 F | BODY MASS INDEX: 41.99 KG/M2 | HEART RATE: 99 BPM | WEIGHT: 252 LBS | SYSTOLIC BLOOD PRESSURE: 121 MMHG | HEIGHT: 65 IN | RESPIRATION RATE: 18 BRPM

## 2022-10-10 DIAGNOSIS — U07.1 COVID-19 VIRUS DETECTED: ICD-10-CM

## 2022-10-10 DIAGNOSIS — R52 BODY ACHES: ICD-10-CM

## 2022-10-10 DIAGNOSIS — U07.1 COVID-19: Primary | ICD-10-CM

## 2022-10-10 LAB — SARS-COV-2 RDRP RESP QL NAA+PROBE: POSITIVE

## 2022-10-10 PROCEDURE — 99284 EMERGENCY DEPT VISIT MOD MDM: CPT | Mod: CR,CS,, | Performed by: EMERGENCY MEDICINE

## 2022-10-10 PROCEDURE — 99283 EMERGENCY DEPT VISIT LOW MDM: CPT

## 2022-10-10 PROCEDURE — U0002 COVID-19 LAB TEST NON-CDC: HCPCS | Performed by: STUDENT IN AN ORGANIZED HEALTH CARE EDUCATION/TRAINING PROGRAM

## 2022-10-10 PROCEDURE — 99284 PR EMERGENCY DEPT VISIT,LEVEL IV: ICD-10-PCS | Mod: CR,CS,, | Performed by: EMERGENCY MEDICINE

## 2022-10-10 NOTE — DISCHARGE INSTRUCTIONS
Diagnosis: Covid-19      As discussed, there is no specific treatment for this for people who do not require oxygen or ICU care.    Starting now, you should self quarantine for at least 10 days. You should wear a mask when going out in public and avoid contact with people who are immunocompromised, pregnant or elderly.    Please alert close contacts that you have been diagnosed with COVID so that they can take appropriate precautions.    Wash your hands frequently.  Do not attend public events.    For fever, cough, shortness of breath, or other viral respiratory symptoms: stay well hydrated, get plenty of sleep, take Tylenol for fevers or pain.     Take ibuprofen (also called Advil, Motrin) for your fevers or pain. This medicine is available over-the-counter in 200 mg tablets.  - You may take 600 mg every 6 hours, or 800 mg every 8 hours as needed   - Do not take more than this amount, as it can cause kidney problems, bleeding in your stomach, and other serious problems.   - Do not also take naproxen (Aleve) at the same time or on the same day  - If you have heart problems or uncontrolled high blood pressure, you should not take ibuprofen for more than 3 days without discussing with your doctor    If your fevers or pain are not controlled with ibuprofen, you may also take acetaminophen (also called Tylenol).  - You may take up to 1,000 mg of Tylenol every 6 hours as needed  - Do not take more than 4,000 mg in 24 hours (1 day) as this may cause liver damage  - Many other medicines include acetaminophen (Tylenol) such as: Norco, Vicodin, Tylenol #3, many cold medicines, etc.  - Please read all labels carefully and do not combine medicines that include acetaminophen.  - If you have a history of liver disease or drink alcohol heavily, do not take acetaminophen (Tylenol) since it can damage your liver    Return to the Emergency Department for symptoms including but not limited to: worsening symptoms, shortness of breath  or chest pain, vomiting with inability to hold down fluids, passing out/fainting/unconsciousness, or other concerning symptoms.

## 2022-10-10 NOTE — ED PROVIDER NOTES
"Encounter Date: 10/10/2022       History     Chief Complaint   Patient presents with    Covid Positive     + home test, wanting to know 100% sure     42 yo male with PMH of HTN, CHF, and CAD presents with positive home covid test. Patient states he tested positive twice at home for covid and is "in denial," so he came to the ER for a confirmatory test. He reports multiple family members also have covid, and he has had body aches and congestion. He denies chest pain, sob, abdominal pain, n/v/d/c, dysuria, hematuria, black/bloody stools, peripheral edema, and syncopal episodes. He hasn't taken any medications for his symptoms. He is only here because he wants another covid test to confirm his home tests.     The history is provided by the patient.   Review of patient's allergies indicates:  No Known Allergies  Past Medical History:   Diagnosis Date    Anxiety     Carbon monoxide poisoning     Caused a seizure    Chronic systolic congestive heart failure 11/15/2019    Coronary artery disease     History of heart artery stent     Hypertension     Obesity      Past Surgical History:   Procedure Laterality Date    CORONARY ANGIOPLASTY      LEFT HEART CATHETERIZATION Right 2018    Procedure: Left heart cath;  Surgeon: Dieter Vargas MD;  Location: [facility] CATH LAB;  Service: Cardiology;  Laterality: Right;     Family History   Problem Relation Age of Onset    Heart disease Mother     Heart disease Father      Social History     Tobacco Use    Smoking status: Former     Packs/day: 1.00     Years: 20.00     Pack years: 20.00     Types: Cigarettes     Quit date: 2019     Years since quittin.9    Smokeless tobacco: Never   Substance Use Topics    Alcohol use: Not Currently     Comment: occasional. none in 2 months    Drug use: Yes     Types: Marijuana     Review of Systems   Constitutional:  Negative for chills and fever.        Body aches   HENT:  Positive for congestion. Negative for sore throat.    Eyes:  " Negative for pain and visual disturbance.   Respiratory:  Negative for cough and shortness of breath.    Cardiovascular:  Negative for chest pain and leg swelling.   Gastrointestinal:  Negative for abdominal pain, constipation, diarrhea, nausea and vomiting.   Endocrine: Negative for polydipsia and polyuria.   Genitourinary:  Negative for dysuria and hematuria.   Musculoskeletal:  Negative for arthralgias and back pain.   Skin:  Negative for color change and rash.   Neurological:  Negative for dizziness, syncope, weakness and headaches.     Physical Exam     Initial Vitals [10/10/22 1745]   BP Pulse Resp Temp SpO2   121/72 99 18 99.1 °F (37.3 °C) 96 %      MAP       --         Physical Exam    Nursing note and vitals reviewed.  Constitutional: He appears well-developed and well-nourished. He is not diaphoretic. No distress.   HENT:   Head: Normocephalic and atraumatic.   Congested   Eyes: Conjunctivae and EOM are normal. Pupils are equal, round, and reactive to light.   Neck: Neck supple.   Normal range of motion.  Cardiovascular:  Normal rate, regular rhythm, normal heart sounds and intact distal pulses.           No murmur heard.  Pulmonary/Chest: Breath sounds normal. No respiratory distress. He has no wheezes. He has no rhonchi. He has no rales.   Abdominal: Abdomen is soft. He exhibits no distension. There is no abdominal tenderness. There is no rebound and no guarding.   Musculoskeletal:         General: No tenderness or edema.      Cervical back: Normal range of motion and neck supple.     Neurological: He is alert and oriented to person, place, and time.   Skin: Skin is warm and dry. Capillary refill takes less than 2 seconds.       ED Course   Procedures  Labs Reviewed   SARS-COV-2 RNA AMPLIFICATION, QUAL          Imaging Results    None          Medications - No data to display  Medical Decision Making:   Initial Assessment:   Hemodynamically and clinically stable 41M presents for a third covid test after  "being "in denial" about his first two at-home test  - rapid covid test  Differential Diagnosis:   Covid-19, viral syndrome  Clinical Tests:   Lab Tests: Ordered  ED Management:  Pt has already had two positive tests at home, so I have ordered a rapid test, and he will be discharged. He has access to [facility] and will follow up his results. He is hemodynamically and clinically stable, so he will be discharged at this time. Pt agrees with the plan.                         Clinical Impression:   Final diagnoses:  [U07.1] COVID-19 (Primary)  [R52] Body aches      ED Disposition Condition    Discharge Stable          ED Prescriptions    None       Follow-up Information       Follow up With Specialties Details Why Contact Info    Abad Patterson MD Internal Medicine Schedule an appointment as soon as possible for a visit  To discuss your recent ER visit and any additional concerns that you may have [address] [place] [address]  [phone]      [facility] - Emergency Dept Emergency Medicine Go to  As needed, If symptoms worsen [address]  [facility] [id number] [phone]             Dk Virgen MD  Resident  10/10/22 1817    "

## 2022-10-10 NOTE — ED TRIAGE NOTES
Cody Castro, a 41 y.o. male presents to the ED w/ complaint of positive at home covid test.     Pt c/o fever, HA, body aches.     Triage note:  Chief Complaint   Patient presents with    Covid Positive     + home test, wanting to know 100% sure     Review of patient's allergies indicates:  No Known Allergies  Past Medical History:   Diagnosis Date    Anxiety     Carbon monoxide poisoning     Caused a seizure    Chronic systolic congestive heart failure 11/15/2019    Coronary artery disease     History of heart artery stent 2013    Hypertension     Obesity

## 2022-10-10 NOTE — Clinical Note
"Cody"Jana Castro was seen and treated in our emergency department on 10/10/2022.  He may return to work on 10/15/2022.  Mr. Catsro tested positive for Covid-19 and should quarantine for 7 days from symptom onset.      If you have any questions or concerns, please don't hesitate to call.      Dk Virgen MD"

## 2022-10-26 ENCOUNTER — PATIENT MESSAGE (OUTPATIENT)
Dept: RESEARCH | Facility: HOSPITAL | Age: 42
End: 2022-10-26
Payer: COMMERCIAL

## 2023-02-22 ENCOUNTER — HOSPITAL ENCOUNTER (EMERGENCY)
Facility: HOSPITAL | Age: 43
Discharge: HOME | End: 2023-02-22
Attending: EMERGENCY MEDICINE
Payer: COMMERCIAL

## 2023-02-22 VITALS
DIASTOLIC BLOOD PRESSURE: 78 MMHG | SYSTOLIC BLOOD PRESSURE: 135 MMHG | TEMPERATURE: 98 F | RESPIRATION RATE: 18 BRPM | WEIGHT: 260 LBS | HEART RATE: 72 BPM | OXYGEN SATURATION: 96 % | BODY MASS INDEX: 41.78 KG/M2 | HEIGHT: 66 IN

## 2023-02-22 DIAGNOSIS — J44.1 COPD EXACERBATION: Primary | ICD-10-CM

## 2023-02-22 LAB
ALBUMIN SERPL BCP-MCNC: 3.9 G/DL (ref 3.5–5.2)
ALP SERPL-CCNC: 72 U/L (ref 55–135)
ALT SERPL W/O P-5'-P-CCNC: 26 U/L (ref 10–44)
ANION GAP SERPL CALC-SCNC: 13 MMOL/L (ref 8–16)
AST SERPL-CCNC: 23 U/L (ref 10–40)
BASOPHILS # BLD AUTO: 0.04 K/UL (ref 0–0.2)
BASOPHILS NFR BLD: 0.5 % (ref 0–1.9)
BILIRUB SERPL-MCNC: 0.2 MG/DL (ref 0.1–1)
BNP SERPL-MCNC: 16 PG/ML (ref 0–99)
BUN SERPL-MCNC: 15 MG/DL (ref 6–20)
CALCIUM SERPL-MCNC: 9.2 MG/DL (ref 8.7–10.5)
CHLORIDE SERPL-SCNC: 106 MMOL/L (ref 95–110)
CO2 SERPL-SCNC: 21 MMOL/L (ref 23–29)
CREAT SERPL-MCNC: 1 MG/DL (ref 0.5–1.4)
DIFFERENTIAL METHOD: ABNORMAL
EOSINOPHIL # BLD AUTO: 0.2 K/UL (ref 0–0.5)
EOSINOPHIL NFR BLD: 1.9 % (ref 0–8)
ERYTHROCYTE [DISTWIDTH] IN BLOOD BY AUTOMATED COUNT: 12.7 % (ref 11.5–14.5)
EST. GFR  (NO RACE VARIABLE): >60 ML/MIN/1.73 M^2
GLUCOSE SERPL-MCNC: 110 MG/DL (ref 70–110)
HCT VFR BLD AUTO: 40.4 % (ref 40–54)
HGB BLD-MCNC: 13.3 G/DL (ref 14–18)
IMM GRANULOCYTES # BLD AUTO: 0.03 K/UL (ref 0–0.04)
IMM GRANULOCYTES NFR BLD AUTO: 0.4 % (ref 0–0.5)
LYMPHOCYTES # BLD AUTO: 1.9 K/UL (ref 1–4.8)
LYMPHOCYTES NFR BLD: 23.8 % (ref 18–48)
MCH RBC QN AUTO: 30.6 PG (ref 27–31)
MCHC RBC AUTO-ENTMCNC: 32.9 G/DL (ref 32–36)
MCV RBC AUTO: 93 FL (ref 82–98)
MONOCYTES # BLD AUTO: 0.7 K/UL (ref 0.3–1)
MONOCYTES NFR BLD: 8.8 % (ref 4–15)
NEUTROPHILS # BLD AUTO: 5.1 K/UL (ref 1.8–7.7)
NEUTROPHILS NFR BLD: 64.6 % (ref 38–73)
NRBC BLD-RTO: 0 /100 WBC
PLATELET # BLD AUTO: 193 K/UL (ref 150–450)
PMV BLD AUTO: 10.1 FL (ref 9.2–12.9)
POTASSIUM SERPL-SCNC: 4 MMOL/L (ref 3.5–5.1)
PROT SERPL-MCNC: 6.5 G/DL (ref 6–8.4)
RBC # BLD AUTO: 4.34 M/UL (ref 4.6–6.2)
SODIUM SERPL-SCNC: 140 MMOL/L (ref 136–145)
TROPONIN I SERPL DL<=0.01 NG/ML-MCNC: <0.006 NG/ML (ref 0–0.03)
WBC # BLD AUTO: 7.82 K/UL (ref 3.9–12.7)

## 2023-02-22 PROCEDURE — 99284 EMERGENCY DEPT VISIT MOD MDM: CPT | Mod: ,,, | Performed by: EMERGENCY MEDICINE

## 2023-02-22 PROCEDURE — 85025 COMPLETE CBC W/AUTO DIFF WBC: CPT

## 2023-02-22 PROCEDURE — 93010 EKG 12-LEAD: ICD-10-PCS | Mod: ,,, | Performed by: INTERNAL MEDICINE

## 2023-02-22 PROCEDURE — 94640 AIRWAY INHALATION TREATMENT: CPT

## 2023-02-22 PROCEDURE — 84484 ASSAY OF TROPONIN QUANT: CPT

## 2023-02-22 PROCEDURE — 80053 COMPREHEN METABOLIC PANEL: CPT

## 2023-02-22 PROCEDURE — 83880 ASSAY OF NATRIURETIC PEPTIDE: CPT

## 2023-02-22 PROCEDURE — 25000242 PHARM REV CODE 250 ALT 637 W/ HCPCS

## 2023-02-22 PROCEDURE — 94761 N-INVAS EAR/PLS OXIMETRY MLT: CPT

## 2023-02-22 PROCEDURE — 25000003 PHARM REV CODE 250

## 2023-02-22 PROCEDURE — 93005 ELECTROCARDIOGRAM TRACING: CPT

## 2023-02-22 PROCEDURE — 63600175 PHARM REV CODE 636 W HCPCS

## 2023-02-22 PROCEDURE — 99285 EMERGENCY DEPT VISIT HI MDM: CPT | Mod: 25

## 2023-02-22 PROCEDURE — 99284 PR EMERGENCY DEPT VISIT,LEVEL IV: ICD-10-PCS | Mod: ,,, | Performed by: EMERGENCY MEDICINE

## 2023-02-22 PROCEDURE — 93010 ELECTROCARDIOGRAM REPORT: CPT | Mod: ,,, | Performed by: INTERNAL MEDICINE

## 2023-02-22 RX ORDER — ALBUTEROL SULFATE 90 UG/1
1-2 AEROSOL, METERED RESPIRATORY (INHALATION) EVERY 6 HOURS PRN
Qty: 6.7 G | Refills: 1 | Status: ON HOLD | OUTPATIENT
Start: 2023-02-22 | End: 2024-03-20

## 2023-02-22 RX ORDER — PREDNISONE 20 MG/1
60 TABLET ORAL
Status: COMPLETED | OUTPATIENT
Start: 2023-02-22 | End: 2023-02-22

## 2023-02-22 RX ORDER — ALBUTEROL SULFATE 2.5 MG/.5ML
5 SOLUTION RESPIRATORY (INHALATION) ONCE
Status: DISCONTINUED | OUTPATIENT
Start: 2023-02-22 | End: 2023-02-22

## 2023-02-22 RX ORDER — IPRATROPIUM BROMIDE AND ALBUTEROL SULFATE 2.5; .5 MG/3ML; MG/3ML
3 SOLUTION RESPIRATORY (INHALATION)
Status: COMPLETED | OUTPATIENT
Start: 2023-02-22 | End: 2023-02-22

## 2023-02-22 RX ORDER — PREDNISONE 20 MG/1
40 TABLET ORAL DAILY
Qty: 8 TABLET | Refills: 0 | Status: SHIPPED | OUTPATIENT
Start: 2023-02-22 | End: 2023-02-26

## 2023-02-22 RX ORDER — ASPIRIN 325 MG
325 TABLET ORAL
Status: COMPLETED | OUTPATIENT
Start: 2023-02-22 | End: 2023-02-22

## 2023-02-22 RX ORDER — ALBUTEROL SULFATE 2.5 MG/.5ML
10 SOLUTION RESPIRATORY (INHALATION) ONCE
Status: COMPLETED | OUTPATIENT
Start: 2023-02-22 | End: 2023-02-22

## 2023-02-22 RX ADMIN — ASPIRIN 325 MG: 325 TABLET ORAL at 06:02

## 2023-02-22 RX ADMIN — PREDNISONE 60 MG: 20 TABLET ORAL at 06:02

## 2023-02-22 RX ADMIN — IPRATROPIUM BROMIDE AND ALBUTEROL SULFATE 3 ML: 2.5; .5 SOLUTION RESPIRATORY (INHALATION) at 06:02

## 2023-02-22 RX ADMIN — ALBUTEROL SULFATE 10 MG: 2.5 SOLUTION RESPIRATORY (INHALATION) at 08:02

## 2023-02-22 NOTE — ED NOTES
Patient identifiers verified and correct for Cody Castro   LOC: The patient is awake, alert and aware of environment with an appropriate affect, the patient is oriented x 3 and speaking appropriately.   APPEARANCE: Patient appears comfortable and in no acute distress, patient is clean and well groomed.  SKIN: The skin is warm and dry, color consistent with ethnicity, patient has normal skin turgor and moist mucus membranes, skin intact, no breakdown or bruising noted.   MUSCULOSKELETAL: Patient moving all extremities spontaneously, no swelling noted.  RESPIRATORY: Airway is open and patent, respirations are spontaneous, patient has a normal effort and rate, audible wheezing noted, no accessory muscle use noted, pt placed on continuous pulse ox with O2 sats noted at 96% on room air. Pt c/o SOB.   CARDIAC: Patient has a normal rate, no edema noted, capillary refill < 3 seconds.   GASTRO: Soft and non tender to palpation, no distention noted, normoactive bowel sounds present in all four quadrants. Pt states bowel movements have been regular.  : Pt denies any pain or frequency with urination.  NEURO: Pt opens eyes spontaneously, behavior appropriate to situation, follows commands, facial expression symmetrical, bilateral hand grasp equal and even, purposeful motor response noted, normal sensation in all extremities when touched with a finger.

## 2023-02-22 NOTE — ED PROVIDER NOTES
Encounter Date: 2/22/2023       History     Chief Complaint   Patient presents with    Shortness of Breath     Feeling short winded starting yesterday, worse with exertion, denies cp, Hx cardiac stents    Shoulder Pain     Shoulder pain since Wednesday with sharp pain down arm, denies trauma     2-year-old male with a history of CAD status post stents and smoking presents with a chief complaint of shortness of breath.  He says that he has been having periods of shortness of breath for the last week accompanied with right-sided shoulder pain.  Says that he was walking at a parade yesterday for about 10 minutes and he says that it took him about 10 minutes to catch his breath afterwards.  He says that he has chronic cough but denies any recent fevers, nausea, vomiting, diarrhea, headaches or new rashes.    The history is provided by the patient. No  was used.   Review of patient's allergies indicates:  No Known Allergies  Past Medical History:   Diagnosis Date    Anxiety     Carbon monoxide poisoning     Caused a seizure    Chronic systolic congestive heart failure 11/15/2019    Coronary artery disease     History of heart artery stent 2013    Hypertension     Obesity      Past Surgical History:   Procedure Laterality Date    CORONARY ANGIOPLASTY      LEFT HEART CATHETERIZATION Right 6/25/2018    Procedure: Left heart cath;  Surgeon: Dieter Vargas MD;  Location: [facility] CATH LAB;  Service: Cardiology;  Laterality: Right;     Family History   Problem Relation Age of Onset    Heart disease Mother     Heart disease Father      Social History     Tobacco Use    Smoking status: Former     Packs/day: 1.00     Years: 20.00     Pack years: 20.00     Types: Cigarettes     Quit date: 11/8/2019     Years since quitting: 3.2    Smokeless tobacco: Never   Substance Use Topics    Alcohol use: Not Currently     Comment: occasional. none in 2 months    Drug use: Yes     Types: Marijuana     Review of  Systems    Physical Exam     Initial Vitals [02/22/23 0556]   BP Pulse Resp Temp SpO2   133/63 83 20 97.7 °F (36.5 °C) 96 %      MAP       --         Physical Exam    Nursing note and vitals reviewed.  Constitutional: He appears well-developed and well-nourished. He is not diaphoretic. No distress.   HENT:   Head: Normocephalic.   Eyes: Pupils are equal, round, and reactive to light.   Neck: Neck supple.   Normal range of motion.  Cardiovascular:  Normal rate, regular rhythm, normal heart sounds and intact distal pulses.           Pulmonary/Chest: He has wheezes. He has no rhonchi. He has no rales.   Abdominal: Abdomen is soft. There is no abdominal tenderness. There is no rebound and no guarding.   Musculoskeletal:         General: No tenderness or edema. Normal range of motion.      Cervical back: Normal range of motion and neck supple.      Comments: Patient has full range of motion of the right shoulder, there is no tenderness in any of the joints, no erythema or swelling appreciated anywhere, patient is neurovascularly intact     Neurological: He is alert and oriented to person, place, and time.   Skin: Skin is warm. Capillary refill takes less than 2 seconds. No rash noted. No erythema. No pallor.   Psychiatric: He has a normal mood and affect. His behavior is normal. Judgment and thought content normal.       ED Course   Procedures  Labs Reviewed   CBC W/ AUTO DIFFERENTIAL - Abnormal; Notable for the following components:       Result Value    RBC 4.34 (*)     Hemoglobin 13.3 (*)     All other components within normal limits   COMPREHENSIVE METABOLIC PANEL - Abnormal; Notable for the following components:    CO2 21 (*)     All other components within normal limits   TROPONIN I   B-TYPE NATRIURETIC PEPTIDE   POCT TROPONIN        ECG Results              EKG 12-lead (Final result)  Result time 02/22/23 07:54:22      Final result by Interface, Lab In [facility] (02/22/23 07:54:22)                   Narrative:     Test Reason : R06.02,    Vent. Rate : 078 BPM     Atrial Rate : 078 BPM     P-R Int : 166 ms          QRS Dur : 092 ms      QT Int : 370 ms       P-R-T Axes : 065 088 047 degrees     QTc Int : 421 ms    Normal sinus rhythm  Anteroseptal infarct (cited on or before 22-AUG-2020)  Abnormal ECG  When compared with ECG of 20-SEP-2022 07:20,  No significant change was found  Confirmed by BLAYNE CASTILLO MD ([id number]) on 2/22/2023 7:54:12 AM    Referred By: BRODY   SELF           Confirmed By:BLAYNE CASTILLO MD                                  Imaging Results              X-Ray Chest 1 View (Final result)  Result time 02/22/23 07:18:01      Final result by Aleksandr Armendariz MD (02/22/23 07:18:01)                   Impression:      No significant intrathoracic abnormality.  No significant detrimental interval change in the appearance of the chest since 09/20/2022 is appreciated.      Electronically signed by: Aleksandr Armendariz MD  Date:    02/22/2023  Time:    07:18               Narrative:    EXAMINATION:  XR CHEST 1 VIEW    TECHNIQUE:  One view    COMPARISON:  Comparison is made to 09/20/2022.  Clinical information of shortness of breath and right shoulder pain    FINDINGS:  Heart size is normal, as is the appearance of the pulmonary vascularity, with no findings indicating current cardiac decompensation noted.  Lung zones are clear, and are free of significant airspace consolidation or volume loss.  No pleural fluid.  No abnormal mediastinal widening.  No pneumothorax.                                       Medications   aspirin tablet 325 mg (325 mg Oral Given 2/22/23 0643)   albuterol-ipratropium 2.5 mg-0.5 mg/3 mL nebulizer solution 3 mL (3 mLs Nebulization Given 2/22/23 0646)   predniSONE tablet 60 mg (60 mg Oral Given 2/22/23 0643)   albuterol sulfate nebulizer solution 10 mg (10 mg Nebulization Given 2/22/23 0804)     Medical Decision Making:   History:   Old Medical Records: I decided to obtain old medical records.  Old  Records Summarized: records from previous admission(s) and records from clinic visits.  Initial Assessment:   42-year-old male in no acute distress.  Patient is well-appearing on exam, which was significant for bilateral wheezes, no chest wall tenderness  Differential Diagnosis:   ACS vs COPD exacerbation; doubt PNA, pt has no ronchi, no dyspnea or fevers  Independently Interpreted Test(s):   I have ordered and independently interpreted EKG Reading(s) - see prior notes  Clinical Tests:   Lab Tests: Reviewed  Radiological Study: Reviewed  Medical Tests: Reviewed  ED Management:  Chest x-ray showed no acute cardiopulmonary process, EKG showed no STEMI, BNP was within normal limits, troponin was negative.  Patient did not respond to initial back-to-back DuoNebs, continuous albuterol ordered, wheezing significantly improved.  I discussed with the patient that his presentation today was likely early COPD and that he would need to follow up with his primary care physician for proper diagnosis.  I discharged him on a course of steroids and albuterol inhaler with instructions.  I answered all questions, provided him with discharge paperwork and he was discharged in stable condition.          Attending Attestation:             Attending ED Notes:   Attending Note:  I have seen the patient, have repeated the key portions of the history and physical, reviewed and agree with the medical documentation, and supervised and managed the medical care of the patient. Additionally, I was present for the critical portion of any procedure(s) performed.    42 M hx of CAD s/p stent, CHF, tobacco use here for sob, cough x 1 week. No fever. + wheezing, - leg swelling.  Right shoulder pain, several weeks ago, now improved.    Well appearing on exam, no respiratory distress, + wheezing  Plan for labs, CXR, nebs, re-eval.  Labs unremarkable.  Chest x-ray with no acute process.  Improved with nebs.  Discharge with prednisone, albuterol.    G.  MD Radha  Staff ED Physician  02/22/2023 9:42 AM           ED Course as of 02/22/23 0915   Wed Feb 22, 2023   0652 EKG 12-lead  Normal sinus rhythm at 78, all intervals within normal limits, no STEMI [BP]   0725 Chest x-ray reviewed and interpreted by me, no pneumonia, pleural effusion or pulmonary edema. [GM]   0805 Troponin I: <0.006 [BP]   0805 BNP: 16 [BP]      ED Course User Index  [BP] Angel Burger MD  [GM] Arlin Garcia MD                 Clinical Impression:   Final diagnoses:  [J44.1] COPD exacerbation (Primary)        ED Disposition Condition    Discharge Stable          ED Prescriptions       Medication Sig Dispense Start Date End Date Auth. Provider    predniSONE (DELTASONE) 20 MG tablet Take 2 tablets (40 mg total) by mouth once daily. for 4 days 8 tablet 2/22/2023 2/26/2023 Angel Burger MD    albuterol (PROVENTIL/VENTOLIN HFA) 90 mcg/actuation inhaler Inhale 1-2 puffs into the lungs every 6 (six) hours as needed for Wheezing. Rescue 6.7 g 2/22/2023 4/23/2023 Angel Burger MD          Follow-up Information       Follow up With Specialties Details Why Contact Info    Abad Patterson MD Internal Medicine In 1 week  [address] [id number] [phone]               Angel Burger MD  Resident  02/22/23 0915       Arlin Garcia MD  02/22/23 1939

## 2023-02-22 NOTE — ED TRIAGE NOTES
Pt reports feeling SOB for the past few days. Pt reports it's worse on exertion. Pt also reports pain to right arm and shoulder. Pt smokes 1 pack/day and has hx cardiac stents. 96% on RA.

## 2023-02-22 NOTE — Clinical Note
"Cody Kendall"Matthew was seen and treated in our emergency department on 2/22/2023.  He may return to work on 02/23/2023.       If you have any questions or concerns, please don't hesitate to call.      Arlin Garcia MD"

## 2023-02-22 NOTE — DISCHARGE INSTRUCTIONS
Diagnosis: shortness of breath    Return to the emergency department if you have signs of severe difficulty breathing including:   - Breathing too fast  - Not able to say more than 2-3 words at a time without taking a breath  - Blue-zak or gray/dusky color of the face, lips or fingers  - Muscles pulling in around the neck or ribs    Return to the emergency department at any time if you think that you are getting worse.    Tests today showed:   Labs Reviewed   CBC W/ AUTO DIFFERENTIAL - Abnormal; Notable for the following components:       Result Value    RBC 4.34 (*)     Hemoglobin 13.3 (*)     All other components within normal limits   COMPREHENSIVE METABOLIC PANEL - Abnormal; Notable for the following components:    CO2 21 (*)     All other components within normal limits   TROPONIN I   B-TYPE NATRIURETIC PEPTIDE   POCT TROPONIN     X-Ray Chest 1 View   Final Result      No significant intrathoracic abnormality.  No significant detrimental interval change in the appearance of the chest since 09/20/2022 is appreciated.         Electronically signed by: Aleksandr Armendariz MD   Date:    02/22/2023   Time:    07:18          Treatments you had today:   Medications   aspirin tablet 325 mg (325 mg Oral Given 2/22/23 0643)   albuterol-ipratropium 2.5 mg-0.5 mg/3 mL nebulizer solution 3 mL (3 mLs Nebulization Given 2/22/23 0646)   predniSONE tablet 60 mg (60 mg Oral Given 2/22/23 0643)   albuterol sulfate nebulizer solution 10 mg (10 mg Nebulization Given 2/22/23 0804)       Home Care Instructions:  - Take steroids as prescribed  - Use inhaler every 6 hours as needed for wheezing or shortness of breath  - Continue taking other home medications as previously prescribed    Follow-Up Plan:  - Follow-up with: Primary care doctor within 2 - 3 days  - Additional outpatient testing and/or evaluation as directed by your doctor

## 2023-06-02 DIAGNOSIS — E78.5 HYPERLIPIDEMIA, UNSPECIFIED HYPERLIPIDEMIA TYPE: ICD-10-CM

## 2023-06-02 DIAGNOSIS — I25.10 CORONARY ARTERY DISEASE INVOLVING NATIVE CORONARY ARTERY OF NATIVE HEART WITHOUT ANGINA PECTORIS: ICD-10-CM

## 2023-06-05 RX ORDER — ROSUVASTATIN CALCIUM 40 MG/1
40 TABLET, COATED ORAL
Qty: 90 TABLET | Refills: 3 | Status: SHIPPED | OUTPATIENT
Start: 2023-06-05

## 2023-06-07 NOTE — PROGRESS NOTES
Subjective:           Chief Complaint: Annual Exam, Shortness of Breath (Holding hands above head, or doing something constantly, pt will have dizziness and shortness of breath), and Dizziness      HPI    42 year old male with CAD s/p PCI (LAD, D1 2013 and RCA, OM1 2018), HFrEF (recovered), AFib, HTN, HLD, tobacco use here for annual follow-up. First visit with me.    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 He drinks 5-6 gatorades per day and about 6 bottles of water.     He did not tolerate 100 mg of metoprolol which makes him fatigued and he has been cutting them in half. He snores and falls asleep in the daytime when he is the passenger in the car.    He denies chest pain/pressure/tightness/discomfort, dyspnea on regular exertion, orthopnea, peripheral edema, palpitations, syncope or claudication. He has PND.    He smokes 1 PPD and is interested in quitting. He seldom drinks alcohol.    Review of Systems  Pertinent findings as per HPI.        Objective:      Vitals:    06/13/23 1124   BP: 126/74   Pulse: 70       Physical Exam  Constitutional:       Appearance: He is well-developed. He is not diaphoretic.   HENT:      Head: Normocephalic and atraumatic.   Eyes:      Pupils: Pupils are equal, round, and reactive to light.   Neck:      Vascular: No carotid bruit or JVD.   Cardiovascular:      Rate and Rhythm: Normal rate and regular rhythm.      Heart sounds: Normal heart sounds. Heart sounds not distant. No murmur heard.    No friction rub. No gallop. No S3 or S4 sounds.   Pulmonary:      Effort: Pulmonary effort is normal. No respiratory distress.      Breath sounds: Normal breath sounds. No wheezing.   Abdominal:      General: Bowel sounds are normal. There is no distension.      Palpations: Abdomen is soft.      Tenderness: There is no abdominal tenderness.   Musculoskeletal:          General: No swelling.      Cervical back: Normal range of motion.   Skin:     General: Skin is warm.      Findings: No erythema.   Neurological:      Mental Status: He is alert and oriented to person, place, and time.   Psychiatric:         Behavior: Behavior normal.         Assessment:       1. Coronary artery disease involving native coronary artery of native heart without angina pectoris    2. Primary hypertension    3. Mixed hyperlipidemia    4. YOJANA (obstructive sleep apnea)    5. Paroxysmal A-fib    6. Chronic combined systolic and diastolic heart failure    7. Tobacco use          Plan     Coronary artery disease involving native coronary artery of native heart without angina pectoris  Continue ASA, high intensity statin (rosuvastatin 40), metoprolol, lisinopril  LDL above goal last year, repeat lipids, if still above goal will add PCS9i  Given worsening fatigue and personal history of early CAD will obtain PET stress    Primary hypertension  Controlled    Mixed hyperlipidemia  As above    YOJANA (obstructive sleep apnea)  Could not afford CPAP previously, will re-refer to sleep medicine    Paroxysmal A-fib  CHADS VASc 3  HAS BLED 2  Taking metoprolol, rivaroxaban (with meals). Uncertain why he is taking 2.5 mg of Xarelto, increase to 20 mg daily to reduce stroke risk    Chronic combined systolic and diastolic heart failure  HFrEF, improved  Continue metoprolol, lisinopril  We discussed the importance of medication compliance, sodium restriction (< 2 grams per day) and daily weights. Instructed to call clinic for weight gain of 3 lbs in one day or 5 lbs in one week.    Tobacco use  Enrollment in the smoking cessation program was offered which he is interested in. Enrolled

## 2023-06-13 ENCOUNTER — OFFICE VISIT (OUTPATIENT)
Dept: CARDIOLOGY | Facility: CLINIC | Age: 43
End: 2023-06-13
Payer: COMMERCIAL

## 2023-06-13 VITALS
SYSTOLIC BLOOD PRESSURE: 126 MMHG | HEART RATE: 70 BPM | WEIGHT: 254.63 LBS | OXYGEN SATURATION: 94 % | BODY MASS INDEX: 41.1 KG/M2 | DIASTOLIC BLOOD PRESSURE: 74 MMHG

## 2023-06-13 DIAGNOSIS — I50.42 CHRONIC COMBINED SYSTOLIC AND DIASTOLIC HEART FAILURE: ICD-10-CM

## 2023-06-13 DIAGNOSIS — I10 PRIMARY HYPERTENSION: Chronic | ICD-10-CM

## 2023-06-13 DIAGNOSIS — I25.10 CORONARY ARTERY DISEASE INVOLVING NATIVE CORONARY ARTERY OF NATIVE HEART WITHOUT ANGINA PECTORIS: Primary | ICD-10-CM

## 2023-06-13 DIAGNOSIS — G47.33 OSA (OBSTRUCTIVE SLEEP APNEA): ICD-10-CM

## 2023-06-13 DIAGNOSIS — I48.0 PAROXYSMAL A-FIB: ICD-10-CM

## 2023-06-13 DIAGNOSIS — E78.2 MIXED HYPERLIPIDEMIA: ICD-10-CM

## 2023-06-13 DIAGNOSIS — Z72.0 TOBACCO USE: ICD-10-CM

## 2023-06-13 PROCEDURE — 99999 PR PBB SHADOW E&M-EST. PATIENT-LVL V: CPT | Mod: PBBFAC,,, | Performed by: INTERNAL MEDICINE

## 2023-06-13 PROCEDURE — 99999 PR PBB SHADOW E&M-EST. PATIENT-LVL V: ICD-10-PCS | Mod: PBBFAC,,, | Performed by: INTERNAL MEDICINE

## 2023-06-13 PROCEDURE — 99214 OFFICE O/P EST MOD 30 MIN: CPT | Mod: S$GLB,,, | Performed by: INTERNAL MEDICINE

## 2023-06-13 PROCEDURE — 99214 PR OFFICE/OUTPT VISIT, EST, LEVL IV, 30-39 MIN: ICD-10-PCS | Mod: S$GLB,,, | Performed by: INTERNAL MEDICINE

## 2023-06-13 PROCEDURE — 3078F DIAST BP <80 MM HG: CPT | Mod: CPTII,S$GLB,, | Performed by: INTERNAL MEDICINE

## 2023-06-13 PROCEDURE — 3078F PR MOST RECENT DIASTOLIC BLOOD PRESSURE < 80 MM HG: ICD-10-PCS | Mod: CPTII,S$GLB,, | Performed by: INTERNAL MEDICINE

## 2023-06-13 PROCEDURE — 3008F PR BODY MASS INDEX (BMI) DOCUMENTED: ICD-10-PCS | Mod: CPTII,S$GLB,, | Performed by: INTERNAL MEDICINE

## 2023-06-13 PROCEDURE — 1160F RVW MEDS BY RX/DR IN RCRD: CPT | Mod: CPTII,S$GLB,, | Performed by: INTERNAL MEDICINE

## 2023-06-13 PROCEDURE — 3074F SYST BP LT 130 MM HG: CPT | Mod: CPTII,S$GLB,, | Performed by: INTERNAL MEDICINE

## 2023-06-13 PROCEDURE — 4010F PR ACE/ARB THEARPY RXD/TAKEN: ICD-10-PCS | Mod: CPTII,S$GLB,, | Performed by: INTERNAL MEDICINE

## 2023-06-13 PROCEDURE — 3008F BODY MASS INDEX DOCD: CPT | Mod: CPTII,S$GLB,, | Performed by: INTERNAL MEDICINE

## 2023-06-13 PROCEDURE — 1159F MED LIST DOCD IN RCRD: CPT | Mod: CPTII,S$GLB,, | Performed by: INTERNAL MEDICINE

## 2023-06-13 PROCEDURE — 3074F PR MOST RECENT SYSTOLIC BLOOD PRESSURE < 130 MM HG: ICD-10-PCS | Mod: CPTII,S$GLB,, | Performed by: INTERNAL MEDICINE

## 2023-06-13 PROCEDURE — 1160F PR REVIEW ALL MEDS BY PRESCRIBER/CLIN PHARMACIST DOCUMENTED: ICD-10-PCS | Mod: CPTII,S$GLB,, | Performed by: INTERNAL MEDICINE

## 2023-06-13 PROCEDURE — 1159F PR MEDICATION LIST DOCUMENTED IN MEDICAL RECORD: ICD-10-PCS | Mod: CPTII,S$GLB,, | Performed by: INTERNAL MEDICINE

## 2023-06-13 PROCEDURE — 4010F ACE/ARB THERAPY RXD/TAKEN: CPT | Mod: CPTII,S$GLB,, | Performed by: INTERNAL MEDICINE

## 2023-06-13 RX ORDER — METOPROLOL SUCCINATE 50 MG/1
50 TABLET, EXTENDED RELEASE ORAL DAILY
Qty: 90 TABLET | Refills: 3 | Status: SHIPPED | OUTPATIENT
Start: 2023-06-13 | End: 2024-06-12

## 2023-07-06 ENCOUNTER — TELEPHONE (OUTPATIENT)
Dept: CARDIOLOGY | Facility: CLINIC | Age: 43
End: 2023-07-06
Payer: COMMERCIAL

## 2023-07-06 NOTE — TELEPHONE ENCOUNTER
Pt having difficulties getting meds. Pharmacy contacted, teaching done on xarelto, Prescription for rosuvastatin called in to [facility] pharmacy and read back correctly by staff.

## 2023-07-13 ENCOUNTER — CLINICAL SUPPORT (OUTPATIENT)
Dept: SMOKING CESSATION | Facility: CLINIC | Age: 43
End: 2023-07-13

## 2023-07-13 ENCOUNTER — HOSPITAL ENCOUNTER (EMERGENCY)
Facility: HOSPITAL | Age: 43
Discharge: HOME | End: 2023-07-13
Attending: STUDENT IN AN ORGANIZED HEALTH CARE EDUCATION/TRAINING PROGRAM
Payer: COMMERCIAL

## 2023-07-13 VITALS
DIASTOLIC BLOOD PRESSURE: 64 MMHG | WEIGHT: 260 LBS | OXYGEN SATURATION: 98 % | RESPIRATION RATE: 16 BRPM | TEMPERATURE: 98 F | SYSTOLIC BLOOD PRESSURE: 140 MMHG | HEART RATE: 75 BPM | BODY MASS INDEX: 41.97 KG/M2

## 2023-07-13 DIAGNOSIS — R25.2 MUSCLE CRAMPING: ICD-10-CM

## 2023-07-13 DIAGNOSIS — F17.200 NICOTINE DEPENDENCE: Primary | ICD-10-CM

## 2023-07-13 DIAGNOSIS — R25.2 CRAMPS, MUSCLE, GENERAL: ICD-10-CM

## 2023-07-13 LAB
ALBUMIN SERPL BCP-MCNC: 4.2 G/DL (ref 3.5–5.2)
ALP SERPL-CCNC: 86 U/L (ref 55–135)
ALT SERPL W/O P-5'-P-CCNC: 35 U/L (ref 10–44)
ANION GAP SERPL CALC-SCNC: 9 MMOL/L (ref 8–16)
AST SERPL-CCNC: 37 U/L (ref 10–40)
BACTERIA #/AREA URNS AUTO: NORMAL /HPF
BASOPHILS # BLD AUTO: 0.03 K/UL (ref 0–0.2)
BASOPHILS NFR BLD: 0.4 % (ref 0–1.9)
BILIRUB SERPL-MCNC: 0.4 MG/DL (ref 0.1–1)
BILIRUB UR QL STRIP: NEGATIVE
BUN SERPL-MCNC: 19 MG/DL (ref 6–20)
CA-I BLDV-SCNC: 1.12 MMOL/L (ref 1.06–1.42)
CALCIUM SERPL-MCNC: 9.5 MG/DL (ref 8.7–10.5)
CHLORIDE SERPL-SCNC: 103 MMOL/L (ref 95–110)
CK SERPL-CCNC: 781 U/L (ref 20–200)
CLARITY UR REFRACT.AUTO: CLEAR
CO2 SERPL-SCNC: 23 MMOL/L (ref 23–29)
COLOR UR AUTO: YELLOW
CREAT SERPL-MCNC: 1.1 MG/DL (ref 0.5–1.4)
DIFFERENTIAL METHOD: ABNORMAL
EOSINOPHIL # BLD AUTO: 0.2 K/UL (ref 0–0.5)
EOSINOPHIL NFR BLD: 2.3 % (ref 0–8)
ERYTHROCYTE [DISTWIDTH] IN BLOOD BY AUTOMATED COUNT: 13.6 % (ref 11.5–14.5)
EST. GFR  (NO RACE VARIABLE): >60 ML/MIN/1.73 M^2
GLUCOSE SERPL-MCNC: 107 MG/DL (ref 70–110)
GLUCOSE UR QL STRIP: NEGATIVE
HCT VFR BLD AUTO: 41.9 % (ref 40–54)
HGB BLD-MCNC: 14.3 G/DL (ref 14–18)
HGB UR QL STRIP: NEGATIVE
HYALINE CASTS UR QL AUTO: 1 /LPF
IMM GRANULOCYTES # BLD AUTO: 0.03 K/UL (ref 0–0.04)
IMM GRANULOCYTES NFR BLD AUTO: 0.4 % (ref 0–0.5)
KETONES UR QL STRIP: NEGATIVE
LEUKOCYTE ESTERASE UR QL STRIP: NEGATIVE
LYMPHOCYTES # BLD AUTO: 1.9 K/UL (ref 1–4.8)
LYMPHOCYTES NFR BLD: 25.8 % (ref 18–48)
MAGNESIUM SERPL-MCNC: 1.7 MG/DL (ref 1.6–2.6)
MCH RBC QN AUTO: 31.4 PG (ref 27–31)
MCHC RBC AUTO-ENTMCNC: 34.1 G/DL (ref 32–36)
MCV RBC AUTO: 92 FL (ref 82–98)
MICROSCOPIC COMMENT: NORMAL
MONOCYTES # BLD AUTO: 0.7 K/UL (ref 0.3–1)
MONOCYTES NFR BLD: 9.2 % (ref 4–15)
NEUTROPHILS # BLD AUTO: 4.6 K/UL (ref 1.8–7.7)
NEUTROPHILS NFR BLD: 61.9 % (ref 38–73)
NITRITE UR QL STRIP: NEGATIVE
NRBC BLD-RTO: 0 /100 WBC
PH UR STRIP: 6 [PH] (ref 5–8)
PLATELET # BLD AUTO: 213 K/UL (ref 150–450)
PMV BLD AUTO: 9.9 FL (ref 9.2–12.9)
POTASSIUM SERPL-SCNC: 4.1 MMOL/L (ref 3.5–5.1)
PROT SERPL-MCNC: 7 G/DL (ref 6–8.4)
PROT UR QL STRIP: ABNORMAL
RBC # BLD AUTO: 4.56 M/UL (ref 4.6–6.2)
RBC #/AREA URNS AUTO: 1 /HPF (ref 0–4)
SODIUM SERPL-SCNC: 135 MMOL/L (ref 136–145)
SP GR UR STRIP: 1.02 (ref 1–1.03)
SQUAMOUS #/AREA URNS AUTO: 0 /HPF
URN SPEC COLLECT METH UR: ABNORMAL
WBC # BLD AUTO: 7.49 K/UL (ref 3.9–12.7)
WBC #/AREA URNS AUTO: 2 /HPF (ref 0–5)

## 2023-07-13 PROCEDURE — 99404 PREV MED CNSL INDIV APPRX 60: CPT | Mod: S$GLB,,,

## 2023-07-13 PROCEDURE — 96360 HYDRATION IV INFUSION INIT: CPT

## 2023-07-13 PROCEDURE — 80053 COMPREHEN METABOLIC PANEL: CPT

## 2023-07-13 PROCEDURE — 93005 ELECTROCARDIOGRAM TRACING: CPT

## 2023-07-13 PROCEDURE — 85025 COMPLETE CBC W/AUTO DIFF WBC: CPT

## 2023-07-13 PROCEDURE — 99404 PR PREVENT COUNSEL,INDIV,60 MIN: ICD-10-PCS | Mod: S$GLB,,,

## 2023-07-13 PROCEDURE — 81001 URINALYSIS AUTO W/SCOPE: CPT

## 2023-07-13 PROCEDURE — 83735 ASSAY OF MAGNESIUM: CPT

## 2023-07-13 PROCEDURE — 99999 PR PBB SHADOW E&M-EST. PATIENT-LVL II: CPT | Mod: PBBFAC,,,

## 2023-07-13 PROCEDURE — 82330 ASSAY OF CALCIUM: CPT | Performed by: STUDENT IN AN ORGANIZED HEALTH CARE EDUCATION/TRAINING PROGRAM

## 2023-07-13 PROCEDURE — 93010 ELECTROCARDIOGRAM REPORT: CPT | Mod: ,,, | Performed by: INTERNAL MEDICINE

## 2023-07-13 PROCEDURE — 63600175 PHARM REV CODE 636 W HCPCS

## 2023-07-13 PROCEDURE — 99285 EMERGENCY DEPT VISIT HI MDM: CPT | Mod: 25

## 2023-07-13 PROCEDURE — 93010 EKG 12-LEAD: ICD-10-PCS | Mod: ,,, | Performed by: INTERNAL MEDICINE

## 2023-07-13 PROCEDURE — 99999 PR PBB SHADOW E&M-EST. PATIENT-LVL II: ICD-10-PCS | Mod: PBBFAC,,,

## 2023-07-13 PROCEDURE — 82550 ASSAY OF CK (CPK): CPT

## 2023-07-13 RX ORDER — IBUPROFEN 200 MG
1 TABLET ORAL DAILY
Qty: 28 PATCH | Refills: 0 | Status: ON HOLD | OUTPATIENT
Start: 2023-07-13 | End: 2024-03-19

## 2023-07-13 RX ADMIN — SODIUM CHLORIDE, POTASSIUM CHLORIDE, SODIUM LACTATE AND CALCIUM CHLORIDE 1000 ML: 600; 310; 30; 20 INJECTION, SOLUTION INTRAVENOUS at 07:07

## 2023-07-13 NOTE — ED PROVIDER NOTES
"Encounter Date: 7/13/2023       History     Chief Complaint   Patient presents with    cramps     Pt arrives c/o right quadrant cramping along with cramping in fingers and in legs x a couple of days.     42-year-old with past medical history of CHF, CAD with coronary stents in place, hypertension, obesity, anxiety, tobacco now presents with chief complaint of cramps.  Patient reports that for the past 2 days he has been experiencing worsening cramps located of his right lumbar back region, bilateral thighs, and reports that this morning his hands started cramping up and spasming.  Patient reports that his hand had a painful spasm this morning which felt like a "Charley horse." Patient denies any numbness, weakness, or tingling, chest pain, coughing, shortness of breath, or dysuria.      Of note patient reports that he had a similar presentation years prior which required admission and he had electrolyte derangements at that time.  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    The history is provided by the patient.   Review of patient's allergies indicates:  No Known Allergies  Past Medical History:   Diagnosis Date    Anxiety     Carbon monoxide poisoning     Caused a seizure    Chronic systolic congestive heart failure 11/15/2019    Coronary artery disease     History of heart artery stent 2013    Hypertension     Obesity      Past Surgical History:   Procedure Laterality Date    CORONARY ANGIOPLASTY      LEFT HEART CATHETERIZATION Right 6/25/2018    Procedure: Left heart cath;  Surgeon: Dieter Vargas MD;  Location: [facility] CATH LAB;  Service: Cardiology;  Laterality: Right;     Family History   Problem Relation Age of Onset    Heart disease Mother     Heart disease Father      Social History     Tobacco Use    Smoking status: Every Day     " Packs/day: 1.00     Years: 20.00     Pack years: 20.00     Types: Cigarettes     Start date: 01/1997    Smokeless tobacco: Never   Substance Use Topics    Alcohol use: Not Currently     Comment: occasional. none in 2 months    Drug use: Yes     Types: Marijuana     Review of Systems  See HPI    Physical Exam     Initial Vitals [07/13/23 0600]   BP Pulse Resp Temp SpO2   (!) 149/63 81 12 98.4 °F (36.9 °C) 96 %      MAP       --         Physical Exam    Nursing note and vitals reviewed.    Gen: AxOx3, well nourished, appears stated age, no pallor, no jaundice, appears dehydrated with dry mucous membranes  Eye: EOMI, no scleral icterus, no periorbital edema or ecchymosis  Head: Normocephalic, atraumatic, no lesions, scalp appears normal  ENT: Neck supple, no stridor, no masses, no drooling or voice changes  CVS: All distal pulses intact with normal rate and rhythm, no JVD, normal S1/S2, no murmur  Pulm: Normal breath sounds, no wheezes, rales or rhonchi, no increased work of breathing  Abd:  Nondistended, soft, nontender, no organomegaly, no CVAT  Ext: No edema, no lesions, rashes, or deformity.  Bilateral straight leg raise test negative  Neuro: GCS15, moving all extremities, gait intact, face grossly symmetric  Psych: normal affect, cooperative, well groomed, makes good eye contact      ED Course   Procedures  Labs Reviewed   CBC W/ AUTO DIFFERENTIAL - Abnormal; Notable for the following components:       Result Value    RBC 4.56 (*)     MCH 31.4 (*)     All other components within normal limits   COMPREHENSIVE METABOLIC PANEL - Abnormal; Notable for the following components:    Sodium 135 (*)     All other components within normal limits   URINALYSIS, REFLEX TO URINE CULTURE - Abnormal; Notable for the following components:    Protein, UA 1+ (*)     All other components within normal limits    Narrative:     Specimen Source->Urine   CK - Abnormal; Notable for the following components:     (*)     All other  components within normal limits    Narrative:     ADD ON CPK AND MAG PER ISMAEL BARON RN PER BRIANNE CHAPA MD   ORDER# [id number] AND [id number] @  07/13/2023  08:12    URINALYSIS MICROSCOPIC    Narrative:     Specimen Source->Urine   CALCIUM, IONIZED   CK   MAGNESIUM   MAGNESIUM    Narrative:     ADD ON CPK AND MAG PER ISMAEL BARON RN PER BRIANNE CHAPA MD   ORDER# [id number] AND [id number] @  07/13/2023  08:12         ECG Results              EKG 12-lead (Final result)  Result time 07/13/23 10:49:31      Final result by Interface, Lab In [facility] (07/13/23 10:49:31)                   Narrative:    Test Reason : R25.2,    Vent. Rate : 057 BPM     Atrial Rate : 057 BPM     P-R Int : 174 ms          QRS Dur : 098 ms      QT Int : 402 ms       P-R-T Axes : 028 081 077 degrees     QTc Int : 391 ms    Sinus bradycardia  Possible Anteroseptal infarct (cited on or before 22-AUG-2020)  Abnormal ECG  When compared with ECG of 22-FEB-2023 05:59,  No significant change was found  Confirmed by Quique BARNES, Surya PATEL ([id number]) on 7/13/2023 10:49:24 AM    Referred By: [email or web address]   SELF           Confirmed By:Surya Lei MD                                  Imaging Results              X-Ray Chest AP Portable (Final result)  Result time 07/13/23 07:40:25      Final result by Aleksandr Armendariz MD (07/13/23 07:40:25)                   Impression:      No significant intrathoracic abnormality.  No significant detrimental interval change in the appearance of the chest since 02/22/2023 is appreciated.      Electronically signed by: Aleksandr Armendariz MD  Date:    07/13/2023  Time:    07:40               Narrative:    EXAMINATION:  XR CHEST AP PORTABLE    TECHNIQUE:  One view    COMPARISON:  Comparison is made to 02/22/2023.    FINDINGS:  Heart size is normal, as is the appearance of the pulmonary vascularity.  Lung zones are clear, and are free of significant airspace consolidation or volume loss.  No pleural fluid.  No abnormal mediastinal  widening.  No pneumothorax.  Coronary artery stents incidentally observed.                                       Medications   lactated ringers bolus 1,000 mL (0 mLs Intravenous Stopped 7/13/23 0840)     Medical Decision Making:   Initial Assessment:   42-year-old male presenting with muscle cramps  Differential Diagnosis:   Electrolyte abnormality  Muscle strain  UTI  Pneumonia  Doubt CVA  Doubt ACS  Clinical Tests:   Lab Tests: Ordered and Reviewed  ED Management:  Patient appeared dehydrated on presentation decided to give 1 L lactated Ringer's and obtain CBC and CMP.  Patient's history and physical examination are highly suspicious for muscle cramps and not consistent with signs concerning for CVA or ACS.    CBC and CMP grossly benign.  UA benign unless suspicion for pyelonephritis.  Chest x-ray normal no suspicion for pneumonia as cause of pain.   and high suspicion for dehydration as cause of cramps without impaired renal function.  Ionized calcium normal.  On re-evaluation following 1 L IV fluids patient reported feeling better with no ongoing cramps.  Decided to discharge patient with return precautions including decreased urinary output.  Patient remained stable in the emergency department.           ED Course as of 07/13/23 1505   Thu Jul 13, 2023   0846 Sodium(!): 135 [PM]   0846 Potassium: 4.1 [PM]   0846 Calcium: 9.5 [PM]   0846 Urinalysis, Reflex to Urine Culture Urine, Clean Catch(!)  Benign [PM]   0847 As per my interpretation, the chest x-ray is grossly normal with no acute findings concerning for new infiltrates, new edema, new effusions, changes in cardiac silhouette.    [PM]   0902 EKG with sinus bradycardia, rate 57, no STEMI. [NN]   1000 CPK(!): 781 [PM]   1138 Ionized Calcium: 1.12 [PM]      ED Course User Index  [NN] Marlyn Nixon MD  [PM] Myles Marie MD                 Clinical Impression:   Final diagnoses:  [R25.2] Cramps, muscle, general  [R25.2] Muscle cramping        ED  Disposition Condition    Discharge Stable          ED Prescriptions    None       Follow-up Information       Follow up With Specialties Details Why Contact Info    Abad Patterson MD Internal Medicine Schedule an appointment as soon as possible for a visit   [address] [facility] [id number] [phone]      [facility] - Emergency Dept Emergency Medicine  As needed, If symptoms worsen [address]  [facility] [address] [phone]             Myles Marie MD  Resident  07/13/23 9354

## 2023-07-13 NOTE — PROGRESS NOTES
Patient will be participating in biweekly tobacco cessation meetings and will begin the prescribed tobacco cessation medication regimen of  21 mg nicotine patch QD .  Patient currently smokes 20-30 cigarettes per day.  Pt's exhaled carbon monoxide level was 16 ppm as per Smoker[employer]zer. (non- smoker = 0-5 ppm.)   Discussed the role of tobacco cessation program, role of nicotine replacement therapy  (NRT) and behavioral changes to assist the patient to reach his goal of being tobacco free.  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  Education and instruction on the role of the NRT, usage and  proper placement of the patch. Patient verbalized understanding and willingness to use patch.

## 2023-07-13 NOTE — DISCHARGE INSTRUCTIONS
Thank you for coming to our Emergency Department today. It is important to remember that some problems or medical conditions are difficult to diagnose and may not be found or addressed during your Emergency Department visit.     Be sure to follow up with your primary care doctor and review all labs/imaging/tests that were performed during your ER visit with them. Some labs/imaging/tests may be outside of the normal range, and require non-emergent follow-up and/or further investigation/treatment/procedures/testing to help diagnose/exclude/prevent complications or other potentially serious medical conditions that were not discussed or addressed during your ER visit.    Please take all medications as directed. All medications may potentially have side-effects and it is impossible to predict which medications may give you side-effects or what side-effects (if any) they will give you.. If you feel that you are having a negative effect or side-effect of any medication you should immediately stop taking them and seek medical attention. If you feel that you are having a life-threatening reaction call 911.    Return to the ER with any questions/concerns, new/concerning symptoms, worsening or failure to improve.    Vaccine status unknown

## 2023-07-13 NOTE — ED TRIAGE NOTES
Pt states that the last two days he's been cramping for the past two days in the ribs, legs, and hand. Cramping is brought on by moving around.

## 2023-07-13 NOTE — Clinical Note
"Cody Kendall" Matthew was seen and treated in our emergency department on 7/13/2023.  He may return to work on 07/17/2023.  Can return to work Monday.      If you have any questions or concerns, please don't hesitate to call.      Radha Ann RN RN    "

## 2023-07-28 ENCOUNTER — HOSPITAL ENCOUNTER (OUTPATIENT)
Facility: HOSPITAL | Age: 43
Discharge: HOME | End: 2023-07-29
Attending: EMERGENCY MEDICINE | Admitting: HOSPITALIST
Payer: COMMERCIAL

## 2023-07-28 DIAGNOSIS — R29.90 EPISODE OF TRANSIENT NEUROLOGIC SYMPTOMS: ICD-10-CM

## 2023-07-28 DIAGNOSIS — I63.9 STROKE: ICD-10-CM

## 2023-07-28 DIAGNOSIS — G45.3 AMAUROSIS FUGAX: Primary | ICD-10-CM

## 2023-07-28 PROBLEM — I50.42 CHRONIC COMBINED SYSTOLIC AND DIASTOLIC HEART FAILURE: Chronic | Status: ACTIVE | Noted: 2018-06-22

## 2023-07-28 PROBLEM — E66.01 MORBID OBESITY: Chronic | Status: ACTIVE | Noted: 2022-03-16

## 2023-07-28 PROBLEM — Z79.01 CHRONIC ANTICOAGULATION: Chronic | Status: ACTIVE | Noted: 2022-03-16

## 2023-07-28 PROBLEM — D72.829 LEUKOCYTOSIS: Status: RESOLVED | Noted: 2019-10-17 | Resolved: 2023-07-28

## 2023-07-28 PROBLEM — H53.8 BLURRY VISION, RIGHT EYE: Status: ACTIVE | Noted: 2023-07-28

## 2023-07-28 PROBLEM — I50.42 CHRONIC COMBINED SYSTOLIC AND DIASTOLIC HEART FAILURE: Chronic | Status: RESOLVED | Noted: 2018-06-22 | Resolved: 2023-07-28

## 2023-07-28 PROBLEM — I25.5 ISCHEMIC CARDIOMYOPATHY: Chronic | Status: RESOLVED | Noted: 2022-03-16 | Resolved: 2023-07-28

## 2023-07-28 PROBLEM — I25.10 CORONARY ARTERY DISEASE INVOLVING NATIVE CORONARY ARTERY OF NATIVE HEART WITHOUT ANGINA PECTORIS: Chronic | Status: ACTIVE | Noted: 2018-06-22

## 2023-07-28 PROBLEM — I25.5 ISCHEMIC CARDIOMYOPATHY: Chronic | Status: ACTIVE | Noted: 2022-03-16

## 2023-07-28 LAB
ALBUMIN SERPL BCP-MCNC: 3.8 G/DL (ref 3.5–5.2)
ALP SERPL-CCNC: 80 U/L (ref 55–135)
ALT SERPL W/O P-5'-P-CCNC: 26 U/L (ref 10–44)
ANION GAP SERPL CALC-SCNC: 8 MMOL/L (ref 8–16)
AST SERPL-CCNC: 28 U/L (ref 10–40)
BASOPHILS # BLD AUTO: 0.04 K/UL (ref 0–0.2)
BASOPHILS NFR BLD: 0.6 % (ref 0–1.9)
BILIRUB SERPL-MCNC: 0.3 MG/DL (ref 0.1–1)
BUN SERPL-MCNC: 14 MG/DL (ref 6–20)
CALCIUM SERPL-MCNC: 9.1 MG/DL (ref 8.7–10.5)
CHLORIDE SERPL-SCNC: 106 MMOL/L (ref 95–110)
CHOLEST SERPL-MCNC: 123 MG/DL (ref 120–199)
CHOLEST/HDLC SERPL: 5.3 {RATIO} (ref 2–5)
CO2 SERPL-SCNC: 26 MMOL/L (ref 23–29)
CREAT SERPL-MCNC: 1 MG/DL (ref 0.5–1.4)
DIFFERENTIAL METHOD: ABNORMAL
EOSINOPHIL # BLD AUTO: 0.2 K/UL (ref 0–0.5)
EOSINOPHIL NFR BLD: 2.2 % (ref 0–8)
ERYTHROCYTE [DISTWIDTH] IN BLOOD BY AUTOMATED COUNT: 13.1 % (ref 11.5–14.5)
EST. GFR  (NO RACE VARIABLE): >60 ML/MIN/1.73 M^2
GLUCOSE SERPL-MCNC: 108 MG/DL (ref 70–110)
HCT VFR BLD AUTO: 37.1 % (ref 40–54)
HDLC SERPL-MCNC: 23 MG/DL (ref 40–75)
HDLC SERPL: 18.7 % (ref 20–50)
HGB BLD-MCNC: 12.5 G/DL (ref 14–18)
IMM GRANULOCYTES # BLD AUTO: 0.02 K/UL (ref 0–0.04)
IMM GRANULOCYTES NFR BLD AUTO: 0.3 % (ref 0–0.5)
INR PPP: 1.1 (ref 0.8–1.2)
LDLC SERPL CALC-MCNC: 65.2 MG/DL (ref 63–159)
LYMPHOCYTES # BLD AUTO: 1.8 K/UL (ref 1–4.8)
LYMPHOCYTES NFR BLD: 25.4 % (ref 18–48)
MCH RBC QN AUTO: 30.9 PG (ref 27–31)
MCHC RBC AUTO-ENTMCNC: 33.7 G/DL (ref 32–36)
MCV RBC AUTO: 92 FL (ref 82–98)
MONOCYTES # BLD AUTO: 0.5 K/UL (ref 0.3–1)
MONOCYTES NFR BLD: 7 % (ref 4–15)
NEUTROPHILS # BLD AUTO: 4.7 K/UL (ref 1.8–7.7)
NEUTROPHILS NFR BLD: 64.5 % (ref 38–73)
NONHDLC SERPL-MCNC: 100 MG/DL
NRBC BLD-RTO: 0 /100 WBC
PLATELET # BLD AUTO: 195 K/UL (ref 150–450)
PMV BLD AUTO: 10.1 FL (ref 9.2–12.9)
POTASSIUM SERPL-SCNC: 3.9 MMOL/L (ref 3.5–5.1)
PROT SERPL-MCNC: 6.5 G/DL (ref 6–8.4)
PROTHROMBIN TIME: 11.4 SEC (ref 9–12.5)
RBC # BLD AUTO: 4.05 M/UL (ref 4.6–6.2)
SODIUM SERPL-SCNC: 140 MMOL/L (ref 136–145)
TRIGL SERPL-MCNC: 174 MG/DL (ref 30–150)
TSH SERPL DL<=0.005 MIU/L-ACNC: 1.53 UIU/ML (ref 0.4–4)
WBC # BLD AUTO: 7.25 K/UL (ref 3.9–12.7)

## 2023-07-28 PROCEDURE — 99223 1ST HOSP IP/OBS HIGH 75: CPT | Mod: ,,, | Performed by: HOSPITALIST

## 2023-07-28 PROCEDURE — 25000003 PHARM REV CODE 250: Performed by: HOSPITALIST

## 2023-07-28 PROCEDURE — 99223 PR INITIAL HOSPITAL CARE,LEVL III: ICD-10-PCS | Mod: ,,, | Performed by: HOSPITALIST

## 2023-07-28 PROCEDURE — 80053 COMPREHEN METABOLIC PANEL: CPT | Performed by: EMERGENCY MEDICINE

## 2023-07-28 PROCEDURE — 84443 ASSAY THYROID STIM HORMONE: CPT | Performed by: EMERGENCY MEDICINE

## 2023-07-28 PROCEDURE — G0378 HOSPITAL OBSERVATION PER HR: HCPCS

## 2023-07-28 PROCEDURE — 80061 LIPID PANEL: CPT | Performed by: EMERGENCY MEDICINE

## 2023-07-28 PROCEDURE — 85025 COMPLETE CBC W/AUTO DIFF WBC: CPT | Performed by: EMERGENCY MEDICINE

## 2023-07-28 PROCEDURE — 25500020 PHARM REV CODE 255: Performed by: EMERGENCY MEDICINE

## 2023-07-28 PROCEDURE — 85610 PROTHROMBIN TIME: CPT | Performed by: EMERGENCY MEDICINE

## 2023-07-28 PROCEDURE — 99285 EMERGENCY DEPT VISIT HI MDM: CPT | Mod: 25

## 2023-07-28 PROCEDURE — 99284 EMERGENCY DEPT VISIT MOD MDM: CPT | Mod: ,,, | Performed by: PSYCHIATRY & NEUROLOGY

## 2023-07-28 PROCEDURE — S4991 NICOTINE PATCH NONLEGEND: HCPCS | Performed by: HOSPITALIST

## 2023-07-28 PROCEDURE — 99284 PR EMERGENCY DEPT VISIT,LEVEL IV: ICD-10-PCS | Mod: ,,, | Performed by: PSYCHIATRY & NEUROLOGY

## 2023-07-28 RX ORDER — SODIUM CHLORIDE 0.9 % (FLUSH) 0.9 %
10 SYRINGE (ML) INJECTION
Status: CANCELLED | OUTPATIENT
Start: 2023-07-28

## 2023-07-28 RX ORDER — IBUPROFEN 200 MG
1 TABLET ORAL DAILY
Status: DISCONTINUED | OUTPATIENT
Start: 2023-07-28 | End: 2023-07-29 | Stop reason: HOSPADM

## 2023-07-28 RX ORDER — CLOPIDOGREL BISULFATE 75 MG/1
75 TABLET ORAL DAILY
Status: DISCONTINUED | OUTPATIENT
Start: 2023-07-28 | End: 2023-07-29 | Stop reason: HOSPADM

## 2023-07-28 RX ORDER — METOPROLOL SUCCINATE 50 MG/1
50 TABLET, EXTENDED RELEASE ORAL DAILY
Status: DISCONTINUED | OUTPATIENT
Start: 2023-07-29 | End: 2023-07-29 | Stop reason: HOSPADM

## 2023-07-28 RX ORDER — TALC
6 POWDER (GRAM) TOPICAL NIGHTLY PRN
Status: DISCONTINUED | OUTPATIENT
Start: 2023-07-28 | End: 2023-07-29 | Stop reason: HOSPADM

## 2023-07-28 RX ORDER — ONDANSETRON 8 MG/1
8 TABLET, ORALLY DISINTEGRATING ORAL EVERY 8 HOURS PRN
Status: DISCONTINUED | OUTPATIENT
Start: 2023-07-28 | End: 2023-07-29 | Stop reason: HOSPADM

## 2023-07-28 RX ORDER — NAPROXEN SODIUM 220 MG/1
81 TABLET, FILM COATED ORAL DAILY
Status: DISCONTINUED | OUTPATIENT
Start: 2023-07-29 | End: 2023-07-29 | Stop reason: HOSPADM

## 2023-07-28 RX ORDER — LISINOPRIL 5 MG/1
10 TABLET ORAL DAILY
Status: DISCONTINUED | OUTPATIENT
Start: 2023-07-29 | End: 2023-07-29 | Stop reason: HOSPADM

## 2023-07-28 RX ORDER — BISACODYL 5 MG
5 TABLET, DELAYED RELEASE (ENTERIC COATED) ORAL DAILY PRN
Status: DISCONTINUED | OUTPATIENT
Start: 2023-07-28 | End: 2023-07-29 | Stop reason: HOSPADM

## 2023-07-28 RX ORDER — TALC
6 POWDER (GRAM) TOPICAL NIGHTLY PRN
Status: CANCELLED | OUTPATIENT
Start: 2023-07-28

## 2023-07-28 RX ORDER — ATORVASTATIN CALCIUM 40 MG/1
80 TABLET, FILM COATED ORAL NIGHTLY
Status: DISCONTINUED | OUTPATIENT
Start: 2023-07-28 | End: 2023-07-29 | Stop reason: HOSPADM

## 2023-07-28 RX ORDER — CLONAZEPAM 0.5 MG/1
1 TABLET ORAL 2 TIMES DAILY PRN
Status: DISCONTINUED | OUTPATIENT
Start: 2023-07-28 | End: 2023-07-29 | Stop reason: HOSPADM

## 2023-07-28 RX ORDER — ACETAMINOPHEN 325 MG/1
650 TABLET ORAL EVERY 6 HOURS PRN
Status: DISCONTINUED | OUTPATIENT
Start: 2023-07-28 | End: 2023-07-29 | Stop reason: HOSPADM

## 2023-07-28 RX ORDER — CALCIUM CARBONATE 200(500)MG
500 TABLET,CHEWABLE ORAL 3 TIMES DAILY PRN
Status: DISCONTINUED | OUTPATIENT
Start: 2023-07-28 | End: 2023-07-29 | Stop reason: HOSPADM

## 2023-07-28 RX ADMIN — CLOPIDOGREL BISULFATE 75 MG: 75 TABLET ORAL at 08:07

## 2023-07-28 RX ADMIN — Medication 1 PATCH: at 05:07

## 2023-07-28 RX ADMIN — CLONAZEPAM 1 MG: 0.5 TABLET ORAL at 08:07

## 2023-07-28 RX ADMIN — IOHEXOL 100 ML: 350 INJECTION, SOLUTION INTRAVENOUS at 04:07

## 2023-07-28 NOTE — SUBJECTIVE & OBJECTIVE
Past Medical History:   Diagnosis Date    Anxiety     Carbon monoxide poisoning     Caused a seizure    Chronic combined systolic and diastolic heart failure 6/22/2018    Chronic systolic congestive heart failure 11/15/2019    Coronary artery disease     History of heart artery stent 2013    Hypertension     Ischemic cardiomyopathy 3/16/2022    EF 35% improved to 50%    NSTEMI (non-ST elevated myocardial infarction) 2/10/2013    Obesity     YOJANA (obstructive sleep apnea)     Paroxysmal A-fib 2/9/2013       Past Surgical History:   Procedure Laterality Date    CORONARY ANGIOPLASTY      LEFT HEART CATHETERIZATION Right 6/25/2018    Procedure: Left heart cath;  Surgeon: Dieter Vargas MD;  Location: [facility] CATH LAB;  Service: Cardiology;  Laterality: Right;       Review of patient's allergies indicates:  No Known Allergies    No current facility-administered medications on file prior to encounter.     Current Outpatient Medications on File Prior to Encounter   Medication Sig    albuterol (PROVENTIL/VENTOLIN HFA) 90 mcg/actuation inhaler Inhale 1-2 puffs into the lungs every 6 (six) hours as needed for Wheezing. Rescue    aspirin (ECOTRIN) 81 MG EC tablet Take 1 tablet (81 mg total) by mouth once daily.    clonazePAM (KLONOPIN) 2 MG Tab Take 1 mg by mouth 2 (two) times daily as needed.    ezetimibe (ZETIA) 10 mg tablet Take 1 tablet (10 mg total) by mouth once daily.    HYDROcodone-acetaminophen (NORCO)  mg per tablet Take 1 tablet by mouth every 6 (six) hours as needed.    lisinopril 10 MG tablet Take 1 tablet (10 mg total) by mouth once daily.    metoprolol succinate (TOPROL-XL) 50 MG 24 hr tablet Take 1 tablet (50 mg total) by mouth once daily.    nicotine (NICODERM CQ) 21 mg/24 hr Place 1 patch onto the skin once daily.    rivaroxaban (XARELTO) 20 mg Tab Take 1 tablet (20 mg total) by mouth daily with dinner or evening meal.    rosuvastatin (CRESTOR) 40 MG Tab take 1 tablet by mouth every evening     Family  History       Problem Relation (Age of Onset)    Heart disease Mother, Father          Tobacco Use    Smoking status: Every Day     Packs/day: 1.00     Years: 20.00     Pack years: 20.00     Types: Cigarettes     Start date: 01/1997    Smokeless tobacco: Never   Substance and Sexual Activity    Alcohol use: Not Currently     Comment: occasional. none in 2 months    Drug use: Yes     Types: Marijuana    Sexual activity: Yes     Partners: Female     Review of Systems   Constitutional:  Negative for chills and fever.   HENT:  Negative for trouble swallowing and voice change.    Eyes:  Positive for visual disturbance. Negative for pain.   Respiratory:  Negative for cough and shortness of breath.    Cardiovascular:  Negative for chest pain and palpitations.   Gastrointestinal:  Negative for nausea and vomiting.   Musculoskeletal:  Negative for gait problem and joint swelling.   Skin:  Negative for color change and rash.   Neurological:  Negative for seizures and syncope.   Psychiatric/Behavioral:  Negative for confusion and hallucinations.    Objective:     Vital Signs (Most Recent):  Temp: 98.2 °F (36.8 °C) (07/28/23 1638)  Pulse: 63 (07/28/23 1638)  Resp: 18 (07/28/23 1638)  BP: 121/78 (07/28/23 1638)  SpO2: 96 % (07/28/23 1638) Vital Signs (24h Range):  Temp:  [97.7 °F (36.5 °C)-98.2 °F (36.8 °C)] 98.2 °F (36.8 °C)  Pulse:  [63-85] 63  Resp:  [18-20] 18  SpO2:  [96 %-98 %] 96 %  BP: (121-142)/(74-78) 121/78     Weight: 115.7 kg (255 lb)  Body mass index is 41.16 kg/m².     Physical Exam  Vitals reviewed.   Constitutional:       General: He is not in acute distress.     Appearance: He is well-developed. He is obese. He is not toxic-appearing or diaphoretic.   HENT:      Head: Normocephalic and atraumatic.      Mouth/Throat:      Mouth: Mucous membranes are moist.      Pharynx: Oropharynx is clear.   Eyes:      General: No scleral icterus.     Conjunctiva/sclera: Conjunctivae normal.   Cardiovascular:      Rate and  Rhythm: Normal rate and regular rhythm.      Heart sounds: Normal heart sounds. No murmur heard.  Pulmonary:      Effort: Pulmonary effort is normal. No respiratory distress.      Breath sounds: Normal breath sounds. No wheezing.   Abdominal:      General: There is no distension.      Palpations: Abdomen is soft.      Tenderness: There is no abdominal tenderness. There is no guarding.   Musculoskeletal:         General: No swelling or tenderness.      Right lower leg: No edema.      Left lower leg: No edema.   Skin:     General: Skin is warm and dry.      Coloration: Skin is not jaundiced or pale.   Neurological:      Mental Status: He is alert and oriented to person, place, and time.      Motor: No tremor or seizure activity.   Psychiatric:         Attention and Perception: Attention normal.         Mood and Affect: Mood and affect normal.         Behavior: Behavior is cooperative.              Significant Labs: All pertinent labs within the past 24 hours have been reviewed.    Significant Imaging: I have reviewed all pertinent imaging results/findings within the past 24 hours.    CTA STROKE MULTI-PHASE 7/28/23: FINDINGS:   Brain:   There is no evidence of hydrocephalus mass effect intracranial hemorrhage or acute territorial infarct.  The brain parenchyma maintains normal attenuation.   Limited evaluation of the orbits demonstrates no gross abnormality.  The visualized paranasal sinuses and mastoid air cells are clear.   CTA:   There is no advanced stenosis at the origins of the vessels from the aortic arch or at the origin of the vertebral arteries from the subclavian arteries.  The vertebral arteries are patent without advanced stenosis although  the proximal and origin of the left vertebral artery is obscured by streak artifact from refluxed intravenous contrast.   There is no significant stenosis at the carotid bifurcations by NASCET criteria with very mild soft and calcified plaque bilaterally..  There is no  evidence of dissection.   Intracranially there is no major branch advanced stenosis/occlusion at the [place].  The ophthalmic arteries at least proximally are patent bilaterally developmentally the left A1 segment is hypoplastic with a near fetal origin of the left PCA.   No aneurysm. The venous sinuses are patent.   No soft tissue mass is identified in the neck.   This was discussed with Dr. Tam harding at 16:33 on 07/28/2023.   Impression:  No acute intracranial process.   No significant stenosis at the carotid bifurcations by NASCET criteria.  The vertebral arteries are patent.  No evidence of dissection.   No major branch stenosis/occlusion at the [id number].   No gross orbital abnormality on limited evaluation.

## 2023-07-28 NOTE — HPI
Cody Castro is a 42 year old white man with cigarette nicotine dependence, obesity, hypertension, hyperlipidemia, coronary artery disease status post left anterior descending artery stent placement on 2/10/2013, distal right coronary artery and first obtuse marginal branch stent placements on 6/25/2018, ischemic cardiomyopathy that improved, paroxysmal atrial fibrillation (anticoagulated on rivaroxaban), obstructive sleep apnea, anxiety, history of carbon monoxide poisoning that caused a seizure. He lives in [place], [place]. He is . 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 His primary care physician is Dr. Abad Patterson. His cardiologist is Dr. Caio Lewis.    He presented to [facility] Emergency Department on 7/28/2023 for acute painless right sided lateral visual field blurring that started while he was digging a hole at work and standing up straight. His symptoms started improving prior to arrival. CTA of the head and neck showed no intracranial process or any other abnormality. Ophthalmology examined him and noted bilateral optic nerve edema. Neurology was consulted. He was admitted to Hospital Medicine Team [facility]

## 2023-07-28 NOTE — ASSESSMENT & PLAN NOTE
41yo female HTN, CHF, CAD,  marijuana use, MI's and cardiac stents who presented to the ED with c/o transient blurry vision of half of his outer vision field of his right eye that occurred this morning. Patient was at work digging a hole when he noticed painless blurry vision that lasted for less then an hour. Reports he is now back to baseline. He was seen by ophthalmology who recommend consulting vascular neurology for transient vision loss, however patient reports he never loss vision. CTA Stroke MP no acute intracranial abnormalities. NIHSS 0.      Symptoms likely a TIA. Recommend continuing home aspiring and statin indefinitely and plavix for 21 days. Follow up in the vascular neurology clinic in 1 month.      Vascular neurology will sign off.

## 2023-07-28 NOTE — CONSULTS
Consultation Report  Ophthalmology Service    Date: 07/28/2023    Chief complaint/Reason for Consult: transient vision loss     History of Present Illness: Cody Castro is a 42 y.o. male with significant cardiac history including multiple MI's and cardiac stents who presents with transient blurry vision of his right eye that occurred earlier today. Patient was at work digging a hole when he noticed painless blurry vision in the temporal part of his visual field of his right eye. Does not think his left eye was involved. The blurriness has now gone away and he feels back to his baseline. Denies other symptoms including headache, rashes, eye pain, numbness, weakness.     POcularHx: No history of ocular problems or past ocular surgeries.  Does not use glasses or contacts.    Current eye gtts: none      PMHx:  has a past medical history of Anxiety, Carbon monoxide poisoning, Chronic systolic congestive heart failure (11/15/2019), Coronary artery disease, History of heart artery stent (2013), Hypertension, and Obesity.     PSurgHx:  has a past surgical history that includes Coronary angioplasty and Left heart catheterization (Right, 6/25/2018).     Home Medications:   Prior to Admission medications    Medication Sig Start Date End Date Taking? Authorizing Provider   albuterol (PROVENTIL/VENTOLIN HFA) 90 mcg/actuation inhaler Inhale 1-2 puffs into the lungs every 6 (six) hours as needed for Wheezing. Rescue 2/22/23 4/23/23  Angel Burger MD   aspirin (ECOTRIN) 81 MG EC tablet Take 1 tablet (81 mg total) by mouth once daily. 6/26/18 6/24/22  Trista Boston PA-C   clonazePAM (KLONOPIN) 2 MG Tab Take 1 mg by mouth 2 (two) times daily as needed. 3/30/21   Historical Provider   ezetimibe (ZETIA) 10 mg tablet Take 1 tablet (10 mg total) by mouth once daily. 6/24/22 6/24/23  Pool Berkowitz MD   HYDROcodone-acetaminophen (NORCO)  mg per tablet Take 1 tablet by mouth every 6 (six) hours as needed. 2/21/22    Historical Provider   lisinopril 10 MG tablet Take 1 tablet (10 mg total) by mouth once daily. 10/18/19 3/22/24  Molly Segovia NP   metoprolol succinate (TOPROL-XL) 50 MG 24 hr tablet Take 1 tablet (50 mg total) by mouth once daily. 6/13/23 6/12/24  Caio Lewis MD   nicotine (NICODERM CQ) 21 mg/24 hr Place 1 patch onto the skin once daily. 7/13/23   Albina Cheng MD   rivaroxaban (XARELTO) 20 mg Tab Take 1 tablet (20 mg total) by mouth daily with dinner or evening meal. 6/13/23   Caio Lewis MD   rosuvastatin (CRESTOR) 40 MG Tab take 1 tablet by mouth every evening 6/5/23   Pool Berkowitz MD        Medications this encounter:     Allergies: has No Known Allergies.     Social:  reports that he has been smoking cigarettes. He started smoking about 26 years ago. He has a 20.00 pack-year smoking history. He has never used smokeless tobacco. He reports that he does not currently use alcohol. He reports current drug use. Drug: Marijuana.     Family Hx: No family history of glaucoma, macular degeneration, or blindness. family history includes Heart disease in his father and mother.     ROS: see hpi     Ocular examination/Dilated fundus examination:  Base Eye Exam       Visual Acuity (Snellen - Linear)         Right Left    Dist sc 20/20 20/20              Tonometry (Tonopen, 12:31 PM)         Right Left    Pressure 10 11              Pupils         Pupils Dark Light Shape React APD    Right PERRL 3 2 Round Brisk None    Left PERRL 3 2 Round Brisk None              Visual Fields         Right Left     Full Full              Extraocular Movement         Right Left     Full, Ortho Full, Ortho                  Slit Lamp and Fundus Exam       External Exam         Right Left    External Normal Normal              Slit Lamp Exam         Right Left    Lids/Lashes Normal Normal    Conjunctiva/Sclera White and quiet White and quiet    Cornea Clear Clear    Anterior Chamber Deep and quiet Deep and quiet     Iris Round and reactive Round and reactive    Lens Clear Clear    Anterior Vitreous Normal Normal              Fundus Exam         Right Left    Disc pink and sharp pink and sharp    C/D Ratio 0.05 0.05    Macula Normal Normal    Vessels no hollenhorst plaques seen Normal    Periphery normal Normal                      =======================================    Assessment/Plan:   1. Transient monocular vision loss, right eye  - patient presents with temporal monocular vision loss of his right eye lasting 1 hour earlier today  - patient has a significant cardiac history including multiple MI's and cardiac stents, on aspirin and anti-coagulation, presentation concerning for amaurosis fugax  - recommend neurology consult for stroke work-up  - recommend MRI brain, EKG, echocardiogram, carotid dopplers    Seen with Dr. Hernandez.     If there are further questions, please page the on call ophthalmology resident.    Gabriel Christensen MD  PGY-2, Ophthalmology  07/28/2023  1:16 PM

## 2023-07-28 NOTE — HPI
41yo female with PMH of cigarette nicotine dependence, obesity, hypertension, hyperlipidemia, coronary artery disease status post left anterior descending artery stent placement on 2/10/2013, distal right coronary artery and first obtuse marginal branch stent placements on 6/25/2018, ischemic cardiomyopathy that improved, paroxysmal atrial fibrillation (anticoagulated on rivaroxaban), obstructive sleep apnea, anxiety, history of carbon monoxide poisoning that caused a seizure,  marijuana use who presented to the ED with c/o transient blurry vision of half of his outer vision field of his right eye that occurred this morning. Patient was at work digging a hole when he noticed painless blurry vision that lasted for less then an hour. Reports he is now back to baseline. He was seen by ophthalmology who recommend consulting vascular neurology for transient vision loss, however patient reports he never loss vision. CTA Stroke MP no acute intracranial abnormalities. NIHSS 0.      Symptoms likely a TIA. Recommend continuing home rivaroxaban and statin indefinitely. Follow up in the vascular neurology clinic in 1 month.      Vascular neurology will sign off.

## 2023-07-28 NOTE — H&P
[facility] - Emergency Dept  [facility] Medicine  History & Physical    Patient Name: Cody Castro  MRN: [id number]  Patient Class: OP- Observation  Admission Date: 7/28/2023  Attending Physician: Hao Dunn MD   Primary Care Provider: Abad Patterson MD         Patient information was obtained from past medical records and ER records.     Subjective:     Principal Problem:Blurry vision, right eye    Chief Complaint:   Chief Complaint   Patient presents with    Eye Problem     R EYE DEC vision but getting better, thought sweat was in my eye,         HPI: Cody Castro is a 42 year old white man with cigarette nicotine dependence, obesity, hypertension, hyperlipidemia, coronary artery disease status post left anterior descending artery stent placement on 2/10/2013, distal right coronary artery and first obtuse marginal branch stent placements on 6/25/2018, ischemic cardiomyopathy that improved, paroxysmal atrial fibrillation (anticoagulated on rivaroxaban), obstructive sleep apnea, anxiety, history of carbon monoxide poisoning that caused a seizure. He lives in [place], [place]. He is . 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 His primary care physician is Dr. Abad Patterson. His cardiologist is Dr. Caio Lewis.    He presented to [facility] Emergency Department on 7/28/2023 for acute painless right sided lateral visual field blurring that started while he was digging a hole at work and standing up straight. His symptoms started improving prior to arrival. CTA of the head and neck showed no intracranial process or any other abnormality. Ophthalmology examined him and noted bilateral optic nerve edema. Neurology was consulted. He was admitted to Hospital Medicine Team N.      Past Medical History:   Diagnosis Date    Anxiety     Carbon monoxide poisoning     Caused a seizure    Chronic combined systolic and diastolic heart failure 6/22/2018    Chronic  systolic congestive heart failure 11/15/2019    Coronary artery disease     History of heart artery stent 2013    Hypertension     Ischemic cardiomyopathy 3/16/2022    EF 35% improved to 50%    NSTEMI (non-ST elevated myocardial infarction) 2/10/2013    Obesity     YOJANA (obstructive sleep apnea)     Paroxysmal A-fib 2/9/2013       Past Surgical History:   Procedure Laterality Date    CORONARY ANGIOPLASTY      LEFT HEART CATHETERIZATION Right 6/25/2018    Procedure: Left heart cath;  Surgeon: Dieter Vargas MD;  Location: [facility] CATH LAB;  Service: Cardiology;  Laterality: Right;       Review of patient's allergies indicates:  No Known Allergies    No current facility-administered medications on file prior to encounter.     Current Outpatient Medications on File Prior to Encounter   Medication Sig    albuterol (PROVENTIL/VENTOLIN HFA) 90 mcg/actuation inhaler Inhale 1-2 puffs into the lungs every 6 (six) hours as needed for Wheezing. Rescue    aspirin (ECOTRIN) 81 MG EC tablet Take 1 tablet (81 mg total) by mouth once daily.    clonazePAM (KLONOPIN) 2 MG Tab Take 1 mg by mouth 2 (two) times daily as needed.    ezetimibe (ZETIA) 10 mg tablet Take 1 tablet (10 mg total) by mouth once daily.    HYDROcodone-acetaminophen (NORCO)  mg per tablet Take 1 tablet by mouth every 6 (six) hours as needed.    lisinopril 10 MG tablet Take 1 tablet (10 mg total) by mouth once daily.    metoprolol succinate (TOPROL-XL) 50 MG 24 hr tablet Take 1 tablet (50 mg total) by mouth once daily.    nicotine (NICODERM CQ) 21 mg/24 hr Place 1 patch onto the skin once daily.    rivaroxaban (XARELTO) 20 mg Tab Take 1 tablet (20 mg total) by mouth daily with dinner or evening meal.    rosuvastatin (CRESTOR) 40 MG Tab take 1 tablet by mouth every evening     Family History       Problem Relation (Age of Onset)    Heart disease Mother, Father          Tobacco Use    Smoking status: Every Day     Packs/day: 1.00     Years:  20.00     Pack years: 20.00     Types: Cigarettes     Start date: 01/1997    Smokeless tobacco: Never   Substance and Sexual Activity    Alcohol use: Not Currently     Comment: occasional. none in 2 months    Drug use: Yes     Types: Marijuana    Sexual activity: Yes     Partners: Female     Review of Systems   Constitutional:  Negative for chills and fever.   HENT:  Negative for trouble swallowing and voice change.    Eyes:  Positive for visual disturbance. Negative for pain.   Respiratory:  Negative for cough and shortness of breath.    Cardiovascular:  Negative for chest pain and palpitations.   Gastrointestinal:  Negative for nausea and vomiting.   Musculoskeletal:  Negative for gait problem and joint swelling.   Skin:  Negative for color change and rash.   Neurological:  Negative for seizures and syncope.   Psychiatric/Behavioral:  Negative for confusion and hallucinations.    Objective:     Vital Signs (Most Recent):  Temp: 98.2 °F (36.8 °C) (07/28/23 1638)  Pulse: 63 (07/28/23 1638)  Resp: 18 (07/28/23 1638)  BP: 121/78 (07/28/23 1638)  SpO2: 96 % (07/28/23 1638) Vital Signs (24h Range):  Temp:  [97.7 °F (36.5 °C)-98.2 °F (36.8 °C)] 98.2 °F (36.8 °C)  Pulse:  [63-85] 63  Resp:  [18-20] 18  SpO2:  [96 %-98 %] 96 %  BP: (121-142)/(74-78) 121/78     Weight: 115.7 kg (255 lb)  Body mass index is 41.16 kg/m².     Physical Exam  Vitals reviewed.   Constitutional:       General: He is not in acute distress.     Appearance: He is well-developed. He is obese. He is not toxic-appearing or diaphoretic.   HENT:      Head: Normocephalic and atraumatic.      Mouth/Throat:      Mouth: Mucous membranes are moist.      Pharynx: Oropharynx is clear.   Eyes:      General: No scleral icterus.     Conjunctiva/sclera: Conjunctivae normal.   Cardiovascular:      Rate and Rhythm: Normal rate and regular rhythm.      Heart sounds: Normal heart sounds. No murmur heard.  Pulmonary:      Effort: Pulmonary effort is normal. No  respiratory distress.      Breath sounds: Normal breath sounds. No wheezing.   Abdominal:      General: There is no distension.      Palpations: Abdomen is soft.      Tenderness: There is no abdominal tenderness. There is no guarding.   Musculoskeletal:         General: No swelling or tenderness.      Right lower leg: No edema.      Left lower leg: No edema.   Skin:     General: Skin is warm and dry.      Coloration: Skin is not jaundiced or pale.   Neurological:      Mental Status: He is alert and oriented to person, place, and time.      Motor: No tremor or seizure activity.   Psychiatric:         Attention and Perception: Attention normal.         Mood and Affect: Mood and affect normal.         Behavior: Behavior is cooperative.              Significant Labs: All pertinent labs within the past 24 hours have been reviewed.    Significant Imaging: I have reviewed all pertinent imaging results/findings within the past 24 hours.    CTA STROKE MULTI-PHASE 7/28/23: FINDINGS:   Brain:   There is no evidence of hydrocephalus mass effect intracranial hemorrhage or acute territorial infarct.  The brain parenchyma maintains normal attenuation.   Limited evaluation of the orbits demonstrates no gross abnormality.  The visualized paranasal sinuses and mastoid air cells are clear.   CTA:   There is no advanced stenosis at the origins of the vessels from the aortic arch or at the origin of the vertebral arteries from the subclavian arteries.  The vertebral arteries are patent without advanced stenosis although  the proximal and origin of the left vertebral artery is obscured by streak artifact from refluxed intravenous contrast.   There is no significant stenosis at the carotid bifurcations by NASCET criteria with very mild soft and calcified plaque bilaterally..  There is no evidence of dissection.   Intracranially there is no major branch advanced stenosis/occlusion at the [place].  The ophthalmic arteries at least  proximally are patent bilaterally developmentally the left A1 segment is hypoplastic with a near fetal origin of the left PCA.   No aneurysm. The venous sinuses are patent.   No soft tissue mass is identified in the neck.   This was discussed with Dr. Tam harding at 16:33 on 07/28/2023.   Impression:  No acute intracranial process.   No significant stenosis at the carotid bifurcations by NASCET criteria.  The vertebral arteries are patent.  No evidence of dissection.   No major branch stenosis/occlusion at the [id number].   No gross orbital abnormality on limited evaluation.     Assessment/Plan:     * Blurry vision, right eye  CTA of head and neck showed no abnormality. Evaluated by Ophthalmology. Neurology consulted.    Chronic anticoagulation  See paroxysmal atrial fibrillation.    Coronary artery disease involving native coronary artery of native heart without angina pectoris  Give atorvastatin in place of home rosuvastatin.    Paroxysmal A-fib  Chronic anticoagulation  Continue home rivaroxaban and metoprolol.    Cigarette smoker  Continue home nicotine patch.    Mixed hyperlipidemia  Give atorvastatin in place of home rosuvastatin.    HTN (hypertension)  Continue home lisinopril. Monitor blood pressures.      VTE Risk Mitigation (From admission, onward)         Ordered     rivaroxaban tablet 20 mg  With dinner         07/28/23 1718                   On 07/28/2023, patient should be placed in hospital observation services under my care.        Hao Dunn MD  Department of Hospital Medicine  [facility] - Emergency Dept

## 2023-07-28 NOTE — ED PROVIDER NOTES
Chief complaint:  Eye Problem (R EYE DEC vision but getting better, thought sweat was in my eye, )      HPI:  Cody Castro is a 42 y.o. male presenting with acute onset of right-sided lateral visual field blurring that happened while he was digging a hole at work and raising himself up straight.  He did not lose vision but stated that everything looked blurry.  He denies any floaters or flashing lights.  His symptoms have mostly resolved at this point.    ROS: As per HPI and below:  Constitutional:  No fevers, no chills  Cardiac: no chest pain  Respiratory: no shortness of breath  Abdominal: no abdominal pain, no nausea, no vomiting  Neuro: no focal numbness, no focal weakness        Review of patient's allergies indicates:  No Known Allergies    No current facility-administered medications on file prior to encounter.     Current Outpatient Medications on File Prior to Encounter   Medication Sig Dispense Refill    albuterol (PROVENTIL/VENTOLIN HFA) 90 mcg/actuation inhaler Inhale 1-2 puffs into the lungs every 6 (six) hours as needed for Wheezing. Rescue 6.7 g 1    aspirin (ECOTRIN) 81 MG EC tablet Take 1 tablet (81 mg total) by mouth once daily.  0    clonazePAM (KLONOPIN) 2 MG Tab Take 1 mg by mouth 2 (two) times daily as needed.      ezetimibe (ZETIA) 10 mg tablet Take 1 tablet (10 mg total) by mouth once daily. 90 tablet 3    HYDROcodone-acetaminophen (NORCO)  mg per tablet Take 1 tablet by mouth every 6 (six) hours as needed.      lisinopril 10 MG tablet Take 1 tablet (10 mg total) by mouth once daily. 90 tablet 17    metoprolol succinate (TOPROL-XL) 50 MG 24 hr tablet Take 1 tablet (50 mg total) by mouth once daily. 90 tablet 3    nicotine (NICODERM CQ) 21 mg/24 hr Place 1 patch onto the skin once daily. 28 patch 0    rivaroxaban (XARELTO) 20 mg Tab Take 1 tablet (20 mg total) by mouth daily with dinner or evening meal. 90 tablet 3    rosuvastatin (CRESTOR) 40 MG Tab take 1 tablet by mouth every  evening 90 tablet 3       PMH:  As per HPI and below:  Past Medical History:   Diagnosis Date    Anxiety     Carbon monoxide poisoning     Caused a seizure    Chronic combined systolic and diastolic heart failure 6/22/2018    Chronic systolic congestive heart failure 11/15/2019    Coronary artery disease     History of heart artery stent 2013    Hypertension     Ischemic cardiomyopathy 3/16/2022    EF 35% improved to 50%    NSTEMI (non-ST elevated myocardial infarction) 2/10/2013    Obesity     YOJANA (obstructive sleep apnea)     Paroxysmal A-fib 2/9/2013     Past Surgical History:   Procedure Laterality Date    CORONARY ANGIOPLASTY      LEFT HEART CATHETERIZATION Right 6/25/2018    Procedure: Left heart cath;  Surgeon: Dieter Vargas MD;  Location: [facility] CATH LAB;  Service: Cardiology;  Laterality: Right;       Social History     Socioeconomic History    Marital status:    Tobacco Use    Smoking status: Every Day     Packs/day: 1.00     Years: 20.00     Pack years: 20.00     Types: Cigarettes     Start date: 01/1997    Smokeless tobacco: Never   Substance and Sexual Activity    Alcohol use: Not Currently     Comment: occasional. none in 2 months    Drug use: Yes     Types: Marijuana    Sexual activity: Yes     Partners: Female       Family History   Problem Relation Age of Onset    Heart disease Mother     Heart disease Father        Physical Exam:    Vitals:    07/28/23 1941   BP: (!) 142/93   Pulse: 70   Resp: 19   Temp: 98 °F (36.7 °C)     Constitutional: Well-nourished, well-developed, in no acute distress, not cachectic  Eyes: PERRLA, EOMI, normal conjunctiva, normal sclera, normal visual acuity, normal funduscopic exam  ENT: Moist Mucous membranes  Respiratory: Clear to auscultation bilaterally, no wheezes, no crackles, no rhonchi  Cardiovascular: Regular rate and rhythm, no murmurs, no rubs, no gallops  Abdominal: Soft, nontender, nondistended, no guarding, no rebound  Musculoskeletal: Normal range of  motion, no obvious deformity, normal capillary refill, head atraumatic, neck supple, no meningismus  Skin: no rash, no ecchymosis, no errythema, no discharge  Neurologic: Cranial nerves II through XII intact, no motor deficits, no sensory deficits, no cerebellar deficits  Psychological: Alert, oriented x3, normal affect, normal mood    Orders Placed This Encounter   Procedures    CTA STROKE MULTI-PHASE    CBC W/ AUTO DIFFERENTIAL    Comprehensive metabolic panel    Protime-INR    TSH    LDL - Lipid Panel    Ambulatory referral/consult to Neurology    Diet Cardiac    Complete Miles Screening Assessment    Complete NIH Stroke Scale    Vital signs    Full code    Inpatient consult to Ophthalmology    Inpatient consult to Vascular (Stroke) Neurology    Insert peripheral IV    Place in Observation       Medications   acetaminophen tablet 650 mg (has no administration in time range)   ondansetron disintegrating tablet 8 mg (has no administration in time range)   calcium carbonate 200 mg calcium (500 mg) chewable tablet 500 mg (has no administration in time range)   bisacodyL EC tablet 5 mg (has no administration in time range)   melatonin tablet 6 mg (has no administration in time range)   clonazePAM tablet 1 mg (1 mg Oral Given 7/28/23 2044)   lisinopriL tablet 10 mg (has no administration in time range)   metoprolol succinate (TOPROL-XL) 24 hr tablet 50 mg (has no administration in time range)   nicotine 21 mg/24 hr 1 patch (1 patch Transdermal Patch Applied 7/28/23 1752)   rivaroxaban tablet 20 mg (20 mg Oral Not Given 7/28/23 1830)   atorvastatin tablet 80 mg (80 mg Oral Not Given 7/28/23 2100)   clopidogreL tablet 75 mg (75 mg Oral Given 7/28/23 2044)   aspirin chewable tablet 81 mg (has no administration in time range)   iohexoL (OMNIPAQUE 350) injection 100 mL (100 mLs Intravenous Given 7/28/23 1610)         Labs Reviewed   CBC W/ AUTO DIFFERENTIAL - Abnormal; Notable for the following components:       Result  Value    RBC 4.05 (*)     Hemoglobin 12.5 (*)     Hematocrit 37.1 (*)     All other components within normal limits   LIPID PANEL - Abnormal; Notable for the following components:    Triglycerides 174 (*)     HDL 23 (*)     HDL/Cholesterol Ratio 18.7 (*)     Total Cholesterol/HDL Ratio 5.3 (*)     All other components within normal limits   COMPREHENSIVE METABOLIC PANEL   PROTIME-INR   TSH           Medical Decision Making  Differential diagnosis includes retinal detachment, vitreous hemorrhage, life-threatening TIA, stroke    Patient presents with acute onset of right-sided visual disturbance to the lateral aspect of his visual field that is most consistent with a possible retinal detachment.  Will discuss with ophthalmology and have them evaluate.    Patient evaluated by ophthalmology and they feel that the patient had an episode of amaurosis fugax.  Stroke workup initiated.  No acute stroke noted.  Will admit for further stroke workup.  Neurology consulted.  Internal medicine will admit.    Amount and/or Complexity of Data Reviewed  Labs: ordered.  Radiology: ordered and independent interpretation performed.     Details: CTA head:  No acute process  ECG/medicine tests: ordered.  Discussion of management or test interpretation with external provider(s): Ophthalmology and they will evaluate the patient    Neurology, hey will evaluate    Internal medicine will admit.    Risk  Decision regarding hospitalization.      Procedures          ASSESSMENT  1. Amaurosis fugax    2. Stroke    3. Episode of transient neurologic symptoms          Disposition:  Admit to internal medicine    Current Discharge Medication List        Current Discharge Medication List        Current Discharge Medication List             Bhupinder Turcios III, MD  07/28/23 3988

## 2023-07-28 NOTE — ED NOTES
Patient identifiers verified and correct for Cody Castro  LOC: The patient is awake, alert and aware of environment with an appropriate affect, the patient is oriented x 3 and speaking appropriately.   APPEARANCE: Patient appears comfortable and in no acute distress, patient is clean and well groomed.  SKIN: The skin is warm and dry, color consistent with ethnicity, patient has normal skin turgor and moist mucus membranes, skin intact, no breakdown or bruising noted.   MUSCULOSKELETAL: Patient moving all extremities spontaneously, no swelling noted.  RESPIRATORY: Airway is open and patent, respirations are spontaneous, patient has a normal effort and rate, no accessory muscle use noted, pt placed on continuous pulse ox with O2 sats noted at 97% on room air.  CARDIAC: Pt placed on cardiac monitor. Patient has a normal rate and regular rhythm, no edema noted, capillary refill < 3 seconds.   GASTRO: Soft and non tender to palpation, no distention noted, normoactive bowel sounds present in all four quadrants. Pt states bowel movements have been regular.  : Pt denies any pain or frequency with urination.  NEURO: Pt opens eyes spontaneously, behavior appropriate to situation, follows commands, facial expression symmetrical, bilateral hand grasp equal and even, purposeful motor response noted, normal sensation in all extremities when touched with a finger.   HEENT: No blurry vision at this time, no periorbital pain, swelling, or redness to the right eye. all other HEENT WNL

## 2023-07-28 NOTE — ED NOTES
LOC: The patient is awake, alert and aware of environment with an appropriate affect, the patient is oriented x 3 and speaking appropriately.  APPEARANCE: Patient resting comfortably and in no acute distress, patient is clean and well groomed, patient's clothing is properly fastened.  SKIN: The skin is warm and dry, color consistent with ethnicity, patient has normal skin turgor and moist mucus membranes, skin intact, no breakdown or bruising noted.  MUSCULOSKELETAL: Patient moving all extremities spontaneously, no obvious swelling or deformities noted.  RESPIRATORY: Airway is open and patent, respirations are spontaneous, patient has a normal effort and rate.  ABDOMEN: Soft and non tender to palpation, no distention noted    Patient arrives from home with the complaint of decreased vision to the right eye. Patient states it is getting better but not back to normal. Patient is in no acute distress,, will continue to monitor.

## 2023-07-29 VITALS
BODY MASS INDEX: 40.98 KG/M2 | WEIGHT: 255 LBS | HEART RATE: 69 BPM | SYSTOLIC BLOOD PRESSURE: 161 MMHG | DIASTOLIC BLOOD PRESSURE: 77 MMHG | OXYGEN SATURATION: 96 % | RESPIRATION RATE: 18 BRPM | TEMPERATURE: 97 F | HEIGHT: 66 IN

## 2023-07-29 PROBLEM — H53.8 BLURRY VISION, RIGHT EYE: Status: RESOLVED | Noted: 2023-07-28 | Resolved: 2023-07-29

## 2023-07-29 PROCEDURE — 99239 HOSP IP/OBS DSCHRG MGMT >30: CPT | Mod: ,,, | Performed by: HOSPITALIST

## 2023-07-29 PROCEDURE — S4991 NICOTINE PATCH NONLEGEND: HCPCS | Performed by: HOSPITALIST

## 2023-07-29 PROCEDURE — G0378 HOSPITAL OBSERVATION PER HR: HCPCS

## 2023-07-29 PROCEDURE — 25000003 PHARM REV CODE 250: Performed by: HOSPITALIST

## 2023-07-29 PROCEDURE — 99239 PR HOSPITAL DISCHARGE DAY,>30 MIN: ICD-10-PCS | Mod: ,,, | Performed by: HOSPITALIST

## 2023-07-29 RX ORDER — CLOPIDOGREL BISULFATE 75 MG/1
75 TABLET ORAL DAILY
Qty: 19 TABLET | Refills: 0 | Status: ON HOLD | OUTPATIENT
Start: 2023-07-29 | End: 2024-03-19

## 2023-07-29 RX ADMIN — ASPIRIN 81 MG 81 MG: 81 TABLET ORAL at 08:07

## 2023-07-29 RX ADMIN — LISINOPRIL 10 MG: 5 TABLET ORAL at 08:07

## 2023-07-29 RX ADMIN — Medication 1 PATCH: at 08:07

## 2023-07-29 RX ADMIN — CLOPIDOGREL BISULFATE 75 MG: 75 TABLET ORAL at 08:07

## 2023-07-29 RX ADMIN — METOPROLOL SUCCINATE 50 MG: 50 TABLET, EXTENDED RELEASE ORAL at 08:07

## 2023-07-29 NOTE — PLAN OF CARE
Date: 07/29/2023      Patient Name: Cody Castro  YOB: 1980  [address] [address]          [facility] Medicine Dept.  [facility] [address] [address] [phone] after hours  [phone] fax Cody Castro has been hospitalized at the [facility] since 7/28/2023.  Please excuse the patient from duties.  Patient may return on 7/30/2023.  No restrictions.     Please contact me if you have any questions.                  __________________________  Hao Dunn MD  07/29/2023

## 2023-07-29 NOTE — PLAN OF CARE
[facility] - Neurosurgery (Hospital)  Discharge Final Note    Primary Care Provider: Abad Patterson MD    Expected Discharge Date: 7/29/2023    Patient to be discharged home.  The patient does not have any home needs.  Family to provide transportation home.      Future Appointments   Date Time Provider Department Center   8/2/2023  4:00 PM Natalie MAY [facility]   8/8/2023  7:30 AM CARDIAC, PET IMAGING [facility]   9/14/2023  4:40 PM Sarai Monae NP [facility]        Final Discharge Note (most recent)       Final Note - 07/29/23 0918          Final Note    Assessment Type Final Discharge Note     Anticipated Discharge Disposition Home or Self Care        Post-Acute Status    Post-Acute Authorization Other     Other Status No Post-Acute Service Needs     Discharge Delays None known at this time                     Important Message from Medicare             Contact Info       [facility] Neu[facility] 8th Fl   Specialty: Neurology    [address] [facility]  [facility] [address]   Phone: [phone]       Next Steps: Follow up in 1 month(s)

## 2023-07-29 NOTE — HOSPITAL COURSE
Vascular Neurology suspected he had a transient ischemic attack. They recommended aspirin and statin indefinitely and clopidrogel for 21 days, with follow-up in clinic in a month.

## 2023-07-29 NOTE — DISCHARGE SUMMARY
[facility]  [facility] Medicine  Discharge Summary      Patient Name: Cody Castro  MRN: [id number]  TISHA: [id number]  Patient Class: OP- Observation  Admission Date: 7/28/2023  Hospital Length of Stay: 0 days  Discharge Date and Time: 7/29/2023  9:22 AM  Attending Physician: Hao Dunn MD   Discharging Provider: aHo Dunn MD  Primary Care Provider: Abad Patterson MD  [facility] Medicine Team: [facility] [place] [place] HOSP MED N Hao Dunn MD  Primary Care Team: [facility]    HPI:   Cody Castro is a 42 year old white man with cigarette nicotine dependence, obesity, hypertension, hyperlipidemia, coronary artery disease status post left anterior descending artery stent placement on 2/10/2013, distal right coronary artery and first obtuse marginal branch stent placements on 6/25/2018, ischemic cardiomyopathy that improved, paroxysmal atrial fibrillation (anticoagulated on rivaroxaban), obstructive sleep apnea, anxiety, history of carbon monoxide poisoning that caused a seizure. He lives in [place], [place]. He is . 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 His primary care physician is Dr. Abad Patterson. His cardiologist is Dr. Caio Lewis.    He presented to [facility] on 7/28/2023 for acute painless right sided lateral visual field blurring that started while he was digging a hole at work and standing up straight. His symptoms started improving prior to arrival. CTA of the head and neck showed no intracranial process or any other abnormality. Ophthalmology examined him and noted bilateral optic nerve edema. Neurology was consulted. He was admitted to [facility] Medicine Team N.        Hospital Course:   Vascular Neurology suspected he had a transient ischemic attack. They recommended aspirin and statin indefinitely and clopidrogel for 21 days, with follow-up in clinic in a month.        Goals of Care Treatment  Preferences:  Code Status: Full Code      Consults:   Consults (From admission, onward)          Status Ordering Provider     Inpatient consult to Vascular (Stroke) Neurology  Once        Provider:  (Not yet assigned)    Completed OMID HARRIS III     Inpatient consult to Ophthalmology  Once        Provider:  (Not yet assigned)    Completed OMID HARRIS III          Final Active Diagnoses:    Diagnosis Date Noted POA    PRINCIPAL PROBLEM:  Episode of transient neurologic symptoms [R29.90] 07/28/2023 Yes    Chronic anticoagulation [Z79.01] 03/16/2022 Not Applicable     Chronic    Coronary artery disease involving native coronary artery of native heart without angina pectoris [I25.10] 06/22/2018 Yes     Chronic    Cigarette smoker [F17.210] 02/09/2013 Yes     Chronic    HTN (hypertension) [I10] 02/09/2013 Yes     Chronic    Paroxysmal A-fib [I48.0] 02/09/2013 Yes     Chronic    Mixed hyperlipidemia [E78.2] 02/09/2013 Yes     Chronic      Problems Resolved During this Admission:    Diagnosis Date Noted Date Resolved POA    Blurry vision, right eye [H53.8] 07/28/2023 07/29/2023 Yes    Chronic combined systolic and diastolic heart failure [I50.42] 06/22/2018 07/28/2023 Yes     Chronic       Discharged Condition: good    Disposition: Home    Follow Up:   Follow-up Information       [facility]ir - Neurology [facility] Follow up in 1 month(s).    Specialty: Neurology  Contact information:  [address] Desir  [facility] [address] [phone]  Additional information:  8th Floor Clinic Tad   Please park in [facility].   Check in desk is located in the [facility]. Please take the C elevator to 8th floor which opens to the [facility].                         Patient Instructions:      Ambulatory referral/consult to Neurology   Standing Status: Future   Referral Priority: Routine Referral Type: Consultation   Referral Reason: Specialty Services Required   Requested Specialty: Neurology   Number of Visits Requested: 1      Diet Adult Regular     Notify your health care provider if you experience any of the following:  persistent dizziness, light-headedness, or visual disturbances     Notify your health care provider if you experience any of the following:  increased confusion or weakness     Activity as tolerated       Significant Diagnostic Studies:    CTA STROKE MULTI-PHASE 7/28/23: FINDINGS:   Brain:   There is no evidence of hydrocephalus mass effect intracranial hemorrhage or acute territorial infarct.  The brain parenchyma maintains normal attenuation.   Limited evaluation of the orbits demonstrates no gross abnormality.  The visualized paranasal sinuses and mastoid air cells are clear.   CTA:   There is no advanced stenosis at the origins of the vessels from the aortic arch or at the origin of the vertebral arteries from the subclavian arteries.  The vertebral arteries are patent without advanced stenosis although  the proximal and origin of the left vertebral artery is obscured by streak artifact from refluxed intravenous contrast.   There is no significant stenosis at the carotid bifurcations by NASCET criteria with very mild soft and calcified plaque bilaterally..  There is no evidence of dissection.   Intracranially there is no major branch advanced stenosis/occlusion at the [place] of [place].  The ophthalmic arteries at least proximally are patent bilaterally developmentally the left A1 segment is hypoplastic with a near fetal origin of the left PCA.   No aneurysm. The venous sinuses are patent.   No soft tissue mass is identified in the neck.   This was discussed with Dr. Tam harding at 16:33 on 07/28/2023.   Impression:  No acute intracranial process.   No significant stenosis at the carotid bifurcations by NASCET criteria.  The vertebral arteries are patent.  No evidence of dissection.   No major branch stenosis/occlusion at the [id number].   No gross orbital abnormality on limited evaluation.      Medications:  Reconciled Home Medications:      Medication List        START taking these medications      clopidogreL 75 mg tablet  Commonly known as: PLAVIX  Take 1 tablet (75 mg total) by mouth once daily. for 19 days            CONTINUE taking these medications      albuterol 90 mcg/actuation inhaler  Commonly known as: PROVENTIL/VENTOLIN HFA  Inhale 1-2 puffs into the lungs every 6 (six) hours as needed for Wheezing. Rescue     aspirin 81 MG EC tablet  Commonly known as: ECOTRIN  Take 1 tablet (81 mg total) by mouth once daily.     clonazePAM 2 MG Tab  Commonly known as: KlonoPIN  Take 1 mg by mouth 2 (two) times daily as needed.     ezetimibe 10 mg tablet  Commonly known as: ZETIA  Take 1 tablet (10 mg total) by mouth once daily.     HYDROcodone-acetaminophen  mg per tablet  Commonly known as: NORCO  Take 1 tablet by mouth every 6 (six) hours as needed.     lisinopriL 10 MG tablet  Take 1 tablet (10 mg total) by mouth once daily.     metoprolol succinate 50 MG 24 hr tablet  Commonly known as: TOPROL-XL  Take 1 tablet (50 mg total) by mouth once daily.     nicotine 21 mg/24 hr  Commonly known as: NICODERM CQ  Place 1 patch onto the skin once daily.     rivaroxaban 20 mg Tab  Commonly known as: XARELTO  Take 1 tablet (20 mg total) by mouth daily with dinner or evening meal.     rosuvastatin 40 MG Tab  Commonly known as: CRESTOR  take 1 tablet by mouth every evening              Indwelling Lines/Drains at time of discharge: None      Time spent on the discharge of patient: 35 minutes         Hao Dunn MD  Department of Hospital Medicine  [facility] Neurosurgery ([facility])

## 2023-07-29 NOTE — NURSING
Pt arrived to room 943 from ED. He is alert and oriented, vitals are stable. Pt denies pain and blurred vision at this time. Wife at bedside and updated on plan of care.

## 2023-07-29 NOTE — CONSULTS
[facility] Desir - Neurosurgery ([facility])  Vascular Neurology  Comprehensive Stroke Center  Consult Note    Inpatient consult to Vascular (Stroke) Neurology  Consult performed by: Akilah Florence NP  Consult ordered by: Bhupinder Turcios III, MD        Assessment/Plan:     Patient is a 42 y.o. year old male with:    * Episode of transient neurologic symptoms  41yo female HTN, CHF, CAD,  marijuana use, MI's and cardiac stents who presented to the ED with c/o transient blurry vision of half of his outer vision field of his right eye that occurred this morning. Patient was at work digging a hole when he noticed painless blurry vision that lasted for less then an hour. Reports he is now back to baseline. He was seen by ophthalmology who recommend consulting vascular neurology for transient vision loss, however patient reports he never loss vision. CTA Stroke MP no acute intracranial abnormalities. NIHSS 0.      Symptoms likely a TIA. Recommend continuing home aspiring and statin indefinitely and plavix for 21 days. Follow up in the vascular neurology clinic in 1 month.      Vascular neurology will sign off.     Paroxysmal A-fib  Stroke risk factor  Continue current AC    Cigarette smoker  Stroke risk factor  Currently on the smoke cessation program, continue     Mixed hyperlipidemia  Stroke risk factor  Continue statin     HTN (hypertension)  Stroke risk factor  Normotension         STROKE DOCUMENTATION          NIH Scale:  1a. Level of Consciousness: 0-->Alert, keenly responsive  1b. LOC Questions: 0-->Answers both questions correctly  1c. LOC Commands: 0-->Performs both tasks correctly  2. Best Gaze: 0-->Normal  3. Visual: 0-->No visual loss  4. Facial Palsy: 0-->Normal symmetrical movements  5a. Motor Arm, Left: 0-->No drift, limb holds 90 (or 45) degrees for full 10 secs  5b. Motor Arm, Right: 0-->No drift, limb holds 90 (or 45) degrees for full 10 secs  6a. Motor Leg, Left: 0-->No drift, leg holds 30 degree position for full 5  secs  6b. Motor Leg, Right: 0-->No drift, leg holds 30 degree position for full 5 secs  7. Limb Ataxia: 0-->Absent  8. Sensory: 0-->Normal, no sensory loss  9. Best Language: 0-->No aphasia, normal  10. Dysarthria: 0-->Normal  11. Extinction and Inattention (formerly Neglect): 0-->No abnormality  Total (NIH Stroke Scale): 0    Modified [place] Score: 0  [place] Coma Scale:    ABCD2 Score:    [id number] Score:   HAS -BLED Score:   ICH Score:   Hunt & Dukes Classification:       Thrombolysis Candidate? No, Out of window - Symptom onset > 4.5 hours, Patient back to neurological baseline     Delays to Thrombolysis?  No    Interventional Revascularization Candidate?   Is the patient eligible for mechanical endovascular reperfusion (JOHN)?  No; at this time symptoms not suggestive of large vessel occlusion    Delays to Thrombectomy? No    Hemorrhagic change of an Ischemic Stroke: Does this patient have an ischemic stroke with hemorrhagic changes? No     Subjective:     History of Present Illness:  43yo female with PMH of cigarette nicotine dependence, obesity, hypertension, hyperlipidemia, coronary artery disease status post left anterior descending artery stent placement on 2/10/2013, distal right coronary artery and first obtuse marginal branch stent placements on 6/25/2018, ischemic cardiomyopathy that improved, paroxysmal atrial fibrillation (anticoagulated on rivaroxaban), obstructive sleep apnea, anxiety, history of carbon monoxide poisoning that caused a seizure,  marijuana use who presented to the ED with c/o transient blurry vision of half of his outer vision field of his right eye that occurred this morning. Patient was at work digging a hole when he noticed painless blurry vision that lasted for less then an hour. Reports he is now back to baseline. He was seen by ophthalmology who recommend consulting vascular neurology for transient vision loss, however patient reports he never loss vision. CTA Stroke MP no  acute intracranial abnormalities. NIHSS 0.      Symptoms likely a TIA. Recommend continuing home rivaroxaban and statin indefinitely. Follow up in the vascular neurology clinic in 1 month.      Vascular neurology will sign off.           Past Medical History:   Diagnosis Date    Anxiety     Carbon monoxide poisoning     Caused a seizure    Chronic combined systolic and diastolic heart failure 6/22/2018    Chronic systolic congestive heart failure 11/15/2019    Coronary artery disease     History of heart artery stent 2013    Hypertension     Ischemic cardiomyopathy 3/16/2022    EF 35% improved to 50%    NSTEMI (non-ST elevated myocardial infarction) 2/10/2013    Obesity     YOJANA (obstructive sleep apnea)     Paroxysmal A-fib 2/9/2013     Past Surgical History:   Procedure Laterality Date    CORONARY ANGIOPLASTY      LEFT HEART CATHETERIZATION Right 6/25/2018    Procedure: Left heart cath;  Surgeon: Dieter Vargas MD;  [facility] CATH LAB;  Service: Cardiology;  Laterality: Right;     Family History   Problem Relation Age of Onset    Heart disease Mother     Heart disease Father      Social History     Tobacco Use    Smoking status: Every Day     Packs/day: 1.00     Years: 20.00     Pack years: 20.00     Types: Cigarettes     Start date: 01/1997    Smokeless tobacco: Never   Substance Use Topics    Alcohol use: Not Currently     Comment: occasional. none in 2 months    Drug use: Yes     Types: Marijuana     Review of patient's allergies indicates:  No Known Allergies    Medications: I have reviewed the current medication administration record.    Medications Prior to Admission   Medication Sig Dispense Refill Last Dose    albuterol (PROVENTIL/VENTOLIN HFA) 90 mcg/actuation inhaler Inhale 1-2 puffs into the lungs every 6 (six) hours as needed for Wheezing. Rescue 6.7 g 1     aspirin (ECOTRIN) 81 MG EC tablet Take 1 tablet (81 mg total) by mouth once daily.  0     clonazePAM (KLONOPIN) 2 MG  Tab Take 1 mg by mouth 2 (two) times daily as needed.       ezetimibe (ZETIA) 10 mg tablet Take 1 tablet (10 mg total) by mouth once daily. 90 tablet 3     HYDROcodone-acetaminophen (NORCO)  mg per tablet Take 1 tablet by mouth every 6 (six) hours as needed.       lisinopril 10 MG tablet Take 1 tablet (10 mg total) by mouth once daily. 90 tablet 17     metoprolol succinate (TOPROL-XL) 50 MG 24 hr tablet Take 1 tablet (50 mg total) by mouth once daily. 90 tablet 3     nicotine (NICODERM CQ) 21 mg/24 hr Place 1 patch onto the skin once daily. 28 patch 0     rivaroxaban (XARELTO) 20 mg Tab Take 1 tablet (20 mg total) by mouth daily with dinner or evening meal. 90 tablet 3     rosuvastatin (CRESTOR) 40 MG Tab take 1 tablet by mouth every evening 90 tablet 3        Review of Systems   Constitutional:  Negative for activity change and fatigue.   HENT:  Negative for drooling and trouble swallowing.    Eyes:  Negative for photophobia and visual disturbance.   Respiratory:  Negative for cough and shortness of breath.    Cardiovascular:  Negative for chest pain and palpitations.   Gastrointestinal:  Negative for nausea and vomiting.   Musculoskeletal:  Negative for neck pain and neck stiffness.   Skin:  Negative for rash and wound.   Neurological:  Negative for facial asymmetry, speech difficulty, weakness and headaches.   Psychiatric/Behavioral:  Negative for confusion. The patient is not nervous/anxious.    Objective:     Vital Signs (Most Recent):  Temp: 98.2 °F (36.8 °C) (07/28/23 1638)  Pulse: 63 (07/28/23 1638)  Resp: 18 (07/28/23 1638)  BP: 121/78 (07/28/23 1638)  SpO2: 96 % (07/28/23 1638)    Vital Signs Range (Last 24H):  Temp:  [97.7 °F (36.5 °C)-98.2 °F (36.8 °C)]   Pulse:  [63-85]   Resp:  [18-20]   BP: (121-142)/(74-78)   SpO2:  [96 %-98 %]        Physical Exam  Constitutional:       General: He is not in acute distress.  HENT:      Head: Normocephalic.   Eyes:      Extraocular Movements: Extraocular  movements intact.   Cardiovascular:      Rate and Rhythm: Normal rate.   Pulmonary:      Effort: Pulmonary effort is normal.   Abdominal:      General: Abdomen is flat.   Musculoskeletal:         General: Normal range of motion.   Skin:     General: Skin is warm and dry.   Neurological:      Mental Status: He is alert and oriented to person, place, and time.      Cranial Nerves: No dysarthria or facial asymmetry.      Motor: No weakness.      Coordination: Finger-Nose-Finger Test normal.   Psychiatric:         Mood and Affect: Mood normal.            Neurological Exam:   LOC: alert  Attention Span: Good   Language: No aphasia  Articulation: No dysarthria  Orientation: Person, Place, Time   Visual Fields: Full  EOM (CN III, IV, VI): Full/intact  Facial Movement (CN VII): Symmetric facial expression    Motor: Arm left  Normal 5/5  Leg left  Normal 5/5  Arm right  Normal 5/5  Leg right Normal 5/5  Cerebellum: No evidence of appendicular or axial ataxia  Sensation: Intact to light touch, temperature and vibration      Laboratory:  CMP:   Recent Labs   Lab 07/28/23  1557   CALCIUM 9.1   ALBUMIN 3.8   PROT 6.5      K 3.9   CO2 26      BUN 14   CREATININE 1.0   ALKPHOS 80   ALT 26   AST 28   BILITOT 0.3     CBC:   Recent Labs   Lab 07/28/23  1557   WBC 7.25   RBC 4.05*   HGB 12.5*   HCT 37.1*      MCV 92   MCH 30.9   MCHC 33.7     Lipid Panel:   Recent Labs   Lab 07/28/23  1557   CHOL 123   LDLCALC 65.2   HDL 23*   TRIG 174*     Coagulation:   Recent Labs   Lab 07/28/23  1557   INR 1.1     Hgb A1C: No results for input(s): HGBA1C in the last 168 hours.  TSH:   Recent Labs   Lab 07/28/23  1557   TSH 1.526       Diagnostic Results:      Brain imaging:  CTA Stroke MP 7/28/2023  No acute intracranial process.     No significant stenosis at the carotid bifurcations by NASCET criteria.  The vertebral arteries are patent.  No evidence of dissection.     No major branch stenosis/occlusion at the  [id number].     No gross orbital abnormality on limited evaluation.        Akilah Florence NP  [facility] St[facility]  Department of Vascular Neurology   [facility] - Neurosurgery [facility])

## 2023-07-29 NOTE — PLAN OF CARE
[facility] - Neurosurgery (Hospital)  Discharge Assessment    Primary Care Provider: Abad Patterson MD     CM obtained discharge planning assessment with patient.      [id number] - [address] AT [facility] [address]  [address] [address]  Phone: [phone] Fax: [phone]       Discharge Assessment (most recent)       BRIEF DISCHARGE ASSESSMENT - 07/29/23 0916          Discharge Planning    Assessment Type Discharge Planning Brief Assessment     Resource/Environmental Concerns none     Support Systems Spouse/significant other     Equipment Currently Used at Home none     Current Living Arrangements home     Patient/Family Anticipates Transition to home with family     Patient/Family Anticipated Services at Transition none     DME Needed Upon Discharge  none     Discharge Plan A Home with family     Discharge Plan B Home with family

## 2023-07-29 NOTE — SUBJECTIVE & OBJECTIVE
Past Medical History:   Diagnosis Date    Anxiety     Carbon monoxide poisoning     Caused a seizure    Chronic combined systolic and diastolic heart failure 6/22/2018    Chronic systolic congestive heart failure 11/15/2019    Coronary artery disease     History of heart artery stent 2013    Hypertension     Ischemic cardiomyopathy 3/16/2022    EF 35% improved to 50%    NSTEMI (non-ST elevated myocardial infarction) 2/10/2013    Obesity     YOJANA (obstructive sleep apnea)     Paroxysmal A-fib 2/9/2013     Past Surgical History:   Procedure Laterality Date    CORONARY ANGIOPLASTY      LEFT HEART CATHETERIZATION Right 6/25/2018    Procedure: Left heart cath;  Surgeon: Dieter Vargas MD;  Location: [facility] CATH LAB;  Service: Cardiology;  Laterality: Right;     Family History   Problem Relation Age of Onset    Heart disease Mother     Heart disease Father      Social History     Tobacco Use    Smoking status: Every Day     Packs/day: 1.00     Years: 20.00     Pack years: 20.00     Types: Cigarettes     Start date: 01/1997    Smokeless tobacco: Never   Substance Use Topics    Alcohol use: Not Currently     Comment: occasional. none in 2 months    Drug use: Yes     Types: Marijuana     Review of patient's allergies indicates:  No Known Allergies    Medications: I have reviewed the current medication administration record.    Medications Prior to Admission   Medication Sig Dispense Refill Last Dose    albuterol (PROVENTIL/VENTOLIN HFA) 90 mcg/actuation inhaler Inhale 1-2 puffs into the lungs every 6 (six) hours as needed for Wheezing. Rescue 6.7 g 1     aspirin (ECOTRIN) 81 MG EC tablet Take 1 tablet (81 mg total) by mouth once daily.  0     clonazePAM (KLONOPIN) 2 MG Tab Take 1 mg by mouth 2 (two) times daily as needed.       ezetimibe (ZETIA) 10 mg tablet Take 1 tablet (10 mg total) by mouth once daily. 90 tablet 3     HYDROcodone-acetaminophen (NORCO)  mg per tablet Take 1 tablet by mouth every 6 (six) hours as  needed.       lisinopril 10 MG tablet Take 1 tablet (10 mg total) by mouth once daily. 90 tablet 17     metoprolol succinate (TOPROL-XL) 50 MG 24 hr tablet Take 1 tablet (50 mg total) by mouth once daily. 90 tablet 3     nicotine (NICODERM CQ) 21 mg/24 hr Place 1 patch onto the skin once daily. 28 patch 0     rivaroxaban (XARELTO) 20 mg Tab Take 1 tablet (20 mg total) by mouth daily with dinner or evening meal. 90 tablet 3     rosuvastatin (CRESTOR) 40 MG Tab take 1 tablet by mouth every evening 90 tablet 3        Review of Systems   Constitutional:  Negative for activity change and fatigue.   HENT:  Negative for drooling and trouble swallowing.    Eyes:  Negative for photophobia and visual disturbance.   Respiratory:  Negative for cough and shortness of breath.    Cardiovascular:  Negative for chest pain and palpitations.   Gastrointestinal:  Negative for nausea and vomiting.   Musculoskeletal:  Negative for neck pain and neck stiffness.   Skin:  Negative for rash and wound.   Neurological:  Negative for facial asymmetry, speech difficulty, weakness and headaches.   Psychiatric/Behavioral:  Negative for confusion. The patient is not nervous/anxious.    Objective:     Vital Signs (Most Recent):  Temp: 98.2 °F (36.8 °C) (07/28/23 1638)  Pulse: 63 (07/28/23 1638)  Resp: 18 (07/28/23 1638)  BP: 121/78 (07/28/23 1638)  SpO2: 96 % (07/28/23 1638)    Vital Signs Range (Last 24H):  Temp:  [97.7 °F (36.5 °C)-98.2 °F (36.8 °C)]   Pulse:  [63-85]   Resp:  [18-20]   BP: (121-142)/(74-78)   SpO2:  [96 %-98 %]        Physical Exam  Constitutional:       General: He is not in acute distress.  HENT:      Head: Normocephalic.   Eyes:      Extraocular Movements: Extraocular movements intact.   Cardiovascular:      Rate and Rhythm: Normal rate.   Pulmonary:      Effort: Pulmonary effort is normal.   Abdominal:      General: Abdomen is flat.   Musculoskeletal:         General: Normal range of motion.   Skin:     General: Skin is warm  and dry.   Neurological:      Mental Status: He is alert and oriented to person, place, and time.      Cranial Nerves: No dysarthria or facial asymmetry.      Motor: No weakness.      Coordination: Finger-Nose-Finger Test normal.   Psychiatric:         Mood and Affect: Mood normal.            Neurological Exam:   LOC: alert  Attention Span: Good   Language: No aphasia  Articulation: No dysarthria  Orientation: Person, Place, Time   Visual Fields: Full  EOM (CN III, IV, VI): Full/intact  Facial Movement (CN VII): Symmetric facial expression    Motor: Arm left  Normal 5/5  Leg left  Normal 5/5  Arm right  Normal 5/5  Leg right Normal 5/5  Cerebellum: No evidence of appendicular or axial ataxia  Sensation: Intact to light touch, temperature and vibration      Laboratory:  CMP:   Recent Labs   Lab 07/28/23  1557   CALCIUM 9.1   ALBUMIN 3.8   PROT 6.5      K 3.9   CO2 26      BUN 14   CREATININE 1.0   ALKPHOS 80   ALT 26   AST 28   BILITOT 0.3     CBC:   Recent Labs   Lab 07/28/23  1557   WBC 7.25   RBC 4.05*   HGB 12.5*   HCT 37.1*      MCV 92   MCH 30.9   MCHC 33.7     Lipid Panel:   Recent Labs   Lab 07/28/23  1557   CHOL 123   LDLCALC 65.2   HDL 23*   TRIG 174*     Coagulation:   Recent Labs   Lab 07/28/23  1557   INR 1.1     Hgb A1C: No results for input(s): HGBA1C in the last 168 hours.  TSH:   Recent Labs   Lab 07/28/23  1557   TSH 1.526       Diagnostic Results:      Brain imaging:  CTA Stroke  7/28/2023  No acute intracranial process.     No significant stenosis at the carotid bifurcations by NASCET criteria.  The vertebral arteries are patent.  No evidence of dissection.     No major branch stenosis/occlusion at the [id number].     No gross orbital abnormality on limited evaluation.

## 2023-08-04 ENCOUNTER — TELEPHONE (OUTPATIENT)
Dept: CARDIOLOGY | Facility: HOSPITAL | Age: 43
End: 2023-08-04
Payer: COMMERCIAL

## 2023-08-08 ENCOUNTER — HOSPITAL ENCOUNTER (OUTPATIENT)
Dept: CARDIOLOGY | Facility: HOSPITAL | Age: 43
Discharge: HOME | End: 2023-08-08
Attending: INTERNAL MEDICINE
Payer: COMMERCIAL

## 2023-08-08 VITALS
HEART RATE: 72 BPM | HEIGHT: 66 IN | WEIGHT: 255 LBS | SYSTOLIC BLOOD PRESSURE: 121 MMHG | BODY MASS INDEX: 40.98 KG/M2 | DIASTOLIC BLOOD PRESSURE: 70 MMHG

## 2023-08-08 DIAGNOSIS — I25.10 CORONARY ARTERY DISEASE INVOLVING NATIVE CORONARY ARTERY OF NATIVE HEART WITHOUT ANGINA PECTORIS: ICD-10-CM

## 2023-08-08 LAB
CFR FLOW - ANTERIOR: 2.42
CFR FLOW - INFERIOR: 2.27
CFR FLOW - LATERAL: 2.46
CFR FLOW - MAX: 3.28
CFR FLOW - MIN: 1.89
CFR FLOW - SEPTAL: 2.38
CFR FLOW - WHOLE HEART: 2.38
CV PHARM DOSE: 0.4 MG
CV STRESS BASE HR: 72 BPM
DIASTOLIC BLOOD PRESSURE: 70 MMHG
EJECTION FRACTION- HIGH: 59 %
END DIASTOLIC INDEX-HIGH: 155 ML/M2
END DIASTOLIC INDEX-LOW: 91 ML/M2
END SYSTOLIC INDEX-HIGH: 78 ML/M2
END SYSTOLIC INDEX-LOW: 40 ML/M2
NUC REST DIASTOLIC VOLUME INDEX: 156
NUC REST EJECTION FRACTION: 49
NUC REST SYSTOLIC VOLUME INDEX: 80
NUC STRESS DIASTOLIC VOLUME INDEX: 166
NUC STRESS EJECTION FRACTION: 51 %
NUC STRESS SYSTOLIC VOLUME INDEX: 82
OHS CV CPX 85 PERCENT MAX PREDICTED HEART RATE MALE: 150
OHS CV CPX MAX PREDICTED HEART RATE: 177
OHS CV CPX PATIENT IS FEMALE: 0
OHS CV CPX PATIENT IS MALE: 1
OHS CV CPX PEAK DIASTOLIC BLOOD PRESSURE: 44 MMHG
OHS CV CPX PEAK HEAR RATE: 67 BPM
OHS CV CPX PEAK RATE PRESSURE PRODUCT: 6566
OHS CV CPX PEAK SYSTOLIC BLOOD PRESSURE: 98 MMHG
OHS CV CPX PERCENT MAX PREDICTED HEART RATE ACHIEVED: 38
OHS CV CPX RATE PRESSURE PRODUCT PRESENTING: 8712
PERFUSION DEFECT 1 SIZE IN %: 7 %
REST FLOW - ANTERIOR: 0.41 CC/MIN/G
REST FLOW - INFERIOR: 0.53 CC/MIN/G
REST FLOW - LATERAL: 0.52 CC/MIN/G
REST FLOW - MAX: 0.67 CC/MIN/G
REST FLOW - MIN: 0.25 CC/MIN/G
REST FLOW - SEPTAL: 0.37 CC/MIN/G
REST FLOW - WHOLE HEART: 0.46 CC/MIN/G
RETIRED EF AND QEF - SEE NOTES: 47 %
STRESS FLOW - ANTERIOR: 0.99 CC/MIN/G
STRESS FLOW - INFERIOR: 1.19 CC/MIN/G
STRESS FLOW - LATERAL: 1.28 CC/MIN/G
STRESS FLOW - MAX: 1.63 CC/MIN/G
STRESS FLOW - MIN: 0.59 CC/MIN/G
STRESS FLOW - SEPTAL: 0.88 CC/MIN/G
STRESS FLOW - WHOLE HEART: 1.09 CC/MIN/G
SYSTOLIC BLOOD PRESSURE: 121 MMHG

## 2023-08-08 PROCEDURE — 78431 MYOCRD IMG PET RST&STRS CT: CPT | Mod: 26,,, | Performed by: INTERNAL MEDICINE

## 2023-08-08 PROCEDURE — 78434 AQMBF PET REST & RX STRESS: CPT | Mod: 26,,, | Performed by: INTERNAL MEDICINE

## 2023-08-08 PROCEDURE — 63600175 PHARM REV CODE 636 W HCPCS: Performed by: INTERNAL MEDICINE

## 2023-08-08 PROCEDURE — 78434 CARDIAC PET SCAN STRESS (CUPID ONLY): ICD-10-PCS | Mod: 26,,, | Performed by: INTERNAL MEDICINE

## 2023-08-08 PROCEDURE — 78431 CARDIAC PET SCAN STRESS (CUPID ONLY): ICD-10-PCS | Mod: 26,,, | Performed by: INTERNAL MEDICINE

## 2023-08-08 PROCEDURE — 78431 MYOCRD IMG PET RST&STRS CT: CPT

## 2023-08-08 PROCEDURE — 93018 CV STRESS TEST I&R ONLY: CPT | Mod: ,,, | Performed by: INTERNAL MEDICINE

## 2023-08-08 PROCEDURE — 93016 CARDIAC PET SCAN STRESS (CUPID ONLY): ICD-10-PCS | Mod: ,,, | Performed by: INTERNAL MEDICINE

## 2023-08-08 PROCEDURE — 93018 CARDIAC PET SCAN STRESS (CUPID ONLY): ICD-10-PCS | Mod: ,,, | Performed by: INTERNAL MEDICINE

## 2023-08-08 PROCEDURE — 78434 AQMBF PET REST & RX STRESS: CPT

## 2023-08-08 PROCEDURE — 93016 CV STRESS TEST SUPVJ ONLY: CPT | Mod: ,,, | Performed by: INTERNAL MEDICINE

## 2023-08-08 RX ORDER — REGADENOSON 0.08 MG/ML
0.4 INJECTION, SOLUTION INTRAVENOUS
Status: COMPLETED | OUTPATIENT
Start: 2023-08-08 | End: 2023-08-08

## 2023-08-08 RX ADMIN — REGADENOSON 0.4 MG: 0.08 INJECTION, SOLUTION INTRAVENOUS at 08:08

## 2023-08-15 ENCOUNTER — PATIENT MESSAGE (OUTPATIENT)
Dept: CARDIOLOGY | Facility: CLINIC | Age: 43
End: 2023-08-15
Payer: COMMERCIAL

## 2023-10-23 ENCOUNTER — CLINICAL SUPPORT (OUTPATIENT)
Dept: SMOKING CESSATION | Facility: CLINIC | Age: 43
End: 2023-10-23

## 2023-10-23 DIAGNOSIS — F17.200 NICOTINE DEPENDENCE: Primary | ICD-10-CM

## 2023-10-23 PROCEDURE — 99999 PR PBB SHADOW E&M-EST. PATIENT-LVL I: CPT | Mod: PBBFAC,,,

## 2023-10-23 PROCEDURE — 99999 PR PBB SHADOW E&M-EST. PATIENT-LVL I: ICD-10-PCS | Mod: PBBFAC,,,

## 2023-10-23 PROCEDURE — 99407 BEHAV CHNG SMOKING > 10 MIN: CPT | Mod: S$GLB,,,

## 2023-10-23 PROCEDURE — 99407 PR TOBACCO USE CESSATION INTENSIVE >10 MINUTES: ICD-10-PCS | Mod: S$GLB,,,

## 2023-10-23 NOTE — PROGRESS NOTES
Spoke with patient today in regard to smoking cessation progress for 3 month telephone follow up, he states not tobacco free. Patient states no interest in returning to the program at this time and would like to be called at a later date and time. Informed patient of benefit period, future follow ups, and contact information if any further help or support is needed. Will complete smart form for 3 month follow up on Quit attempt #1.

## 2023-11-09 NOTE — ASSESSMENT & PLAN NOTE
NSTEMI (non-ST elevated myocardial infarction)  Recent NSTEMI 7/2018 with MANJINDER to dRCA, prox Cx and OM1.  Completed DAPT.  - c/w ASA, statin  - f/u cardiology; would like to f/u here   Bilateral Helical Rim Advancement Flap Text: The defect edges were debeveled with a #15 blade scalpel.  Given the location of the defect and the proximity to free margins (helical rim) a bilateral helical rim advancement flap was deemed most appropriate. Using a sterile surgical marker, the appropriate advancement flaps were drawn incorporating the defect and placing the expected incisions between the helical rim and antihelix where possible.  The area thus outlined was incised through and through with a #15 scalpel blade.  With a skin hook and iris scissors, the flaps were gently and sharply undermined and freed up. Following this, the designed flaps were placed into the primary defect and sutured into place.

## 2024-01-11 ENCOUNTER — CLINICAL SUPPORT (OUTPATIENT)
Dept: SMOKING CESSATION | Facility: CLINIC | Age: 44
End: 2024-01-11

## 2024-01-11 DIAGNOSIS — F17.200 NICOTINE DEPENDENCE: Primary | ICD-10-CM

## 2024-01-11 PROCEDURE — 99999 PR PBB SHADOW E&M-EST. PATIENT-LVL I: CPT | Mod: PBBFAC,,,

## 2024-01-11 NOTE — PROGRESS NOTES
Spoke with patient today in regard to smoking cessation progress for 6 month phone follow up on quit 1.  Patient not tobacco free at this time.  Patient has scheduled an appointment to return to the program for Quit attempt #2.  Informed patient of benefit period, future follow ups, and contact information if any further help or support is needed.  Will complete smart form on Quit attempt #1.

## 2024-01-23 ENCOUNTER — OCCUPATIONAL HEALTH (OUTPATIENT)
Dept: URGENT CARE | Facility: CLINIC | Age: 44
End: 2024-01-23

## 2024-01-23 DIAGNOSIS — Z13.9 ENCOUNTER FOR SCREENING: Primary | ICD-10-CM

## 2024-01-23 PROCEDURE — 99199 UNLISTED SPECIAL SVC PX/RPRT: CPT | Mod: S$GLB,,, | Performed by: SURGERY

## 2024-01-23 PROCEDURE — 80305 DRUG TEST PRSMV DIR OPT OBS: CPT | Mod: S$GLB,,, | Performed by: SURGERY

## 2024-01-29 ENCOUNTER — TELEPHONE (OUTPATIENT)
Dept: SMOKING CESSATION | Facility: CLINIC | Age: 44
End: 2024-01-29
Payer: COMMERCIAL

## 2024-01-29 NOTE — TELEPHONE ENCOUNTER
[facility]- called to check on patient, no show for intake appointment. Left message to call back to reschedule  [phone] or [phone]

## 2024-03-18 ENCOUNTER — HOSPITAL ENCOUNTER (OUTPATIENT)
Facility: HOSPITAL | Age: 44
Discharge: HOME | End: 2024-03-20
Attending: EMERGENCY MEDICINE | Admitting: STUDENT IN AN ORGANIZED HEALTH CARE EDUCATION/TRAINING PROGRAM
Payer: COMMERCIAL

## 2024-03-18 DIAGNOSIS — R09.02 HYPOXIA: ICD-10-CM

## 2024-03-18 DIAGNOSIS — Z76.89 SLEEP CONCERN: ICD-10-CM

## 2024-03-18 DIAGNOSIS — R07.9 CHEST PAIN: ICD-10-CM

## 2024-03-18 DIAGNOSIS — R06.02 SOB (SHORTNESS OF BREATH): ICD-10-CM

## 2024-03-18 DIAGNOSIS — F17.210 CIGARETTE SMOKER: Chronic | ICD-10-CM

## 2024-03-18 DIAGNOSIS — J44.1 COPD EXACERBATION: ICD-10-CM

## 2024-03-18 DIAGNOSIS — R06.02 SHORTNESS OF BREATH: Primary | ICD-10-CM

## 2024-03-18 PROBLEM — R06.09 DYSPNEA ON EXERTION: Status: ACTIVE | Noted: 2024-03-18

## 2024-03-18 LAB
ALBUMIN SERPL BCP-MCNC: 4.1 G/DL (ref 3.5–5.2)
ALP SERPL-CCNC: 74 U/L (ref 55–135)
ALT SERPL W/O P-5'-P-CCNC: 37 U/L (ref 10–44)
ANION GAP SERPL CALC-SCNC: 12 MMOL/L (ref 8–16)
AST SERPL-CCNC: 33 U/L (ref 10–40)
BASOPHILS # BLD AUTO: 0.05 K/UL (ref 0–0.2)
BASOPHILS NFR BLD: 0.7 % (ref 0–1.9)
BILIRUB SERPL-MCNC: 0.3 MG/DL (ref 0.1–1)
BNP SERPL-MCNC: 19 PG/ML (ref 0–99)
BUN SERPL-MCNC: 19 MG/DL (ref 6–20)
CALCIUM SERPL-MCNC: 9.3 MG/DL (ref 8.7–10.5)
CHLORIDE SERPL-SCNC: 105 MMOL/L (ref 95–110)
CHOLEST SERPL-MCNC: 164 MG/DL (ref 120–199)
CHOLEST/HDLC SERPL: 5.1 {RATIO} (ref 2–5)
CO2 SERPL-SCNC: 23 MMOL/L (ref 23–29)
CREAT SERPL-MCNC: 1.4 MG/DL (ref 0.5–1.4)
D DIMER PPP IA.FEU-MCNC: 0.23 MG/L FEU
DIFFERENTIAL METHOD BLD: ABNORMAL
EOSINOPHIL # BLD AUTO: 0.2 K/UL (ref 0–0.5)
EOSINOPHIL NFR BLD: 2.4 % (ref 0–8)
ERYTHROCYTE [DISTWIDTH] IN BLOOD BY AUTOMATED COUNT: 12.6 % (ref 11.5–14.5)
EST. GFR  (NO RACE VARIABLE): >60 ML/MIN/1.73 M^2
GLUCOSE SERPL-MCNC: 110 MG/DL (ref 70–110)
HCT VFR BLD AUTO: 43.2 % (ref 40–54)
HCV AB SERPL QL IA: NORMAL
HDLC SERPL-MCNC: 32 MG/DL (ref 40–75)
HDLC SERPL: 19.5 % (ref 20–50)
HGB BLD-MCNC: 14.7 G/DL (ref 14–18)
HIV 1+2 AB+HIV1 P24 AG SERPL QL IA: NORMAL
IMM GRANULOCYTES # BLD AUTO: 0.04 K/UL (ref 0–0.04)
IMM GRANULOCYTES NFR BLD AUTO: 0.6 % (ref 0–0.5)
INFLUENZA A, MOLECULAR: NOT DETECTED
INFLUENZA B, MOLECULAR: NOT DETECTED
LDLC SERPL CALC-MCNC: 93.4 MG/DL (ref 63–159)
LYMPHOCYTES # BLD AUTO: 1.9 K/UL (ref 1–4.8)
LYMPHOCYTES NFR BLD: 26.3 % (ref 18–48)
MCH RBC QN AUTO: 32 PG (ref 27–31)
MCHC RBC AUTO-ENTMCNC: 34 G/DL (ref 32–36)
MCV RBC AUTO: 94 FL (ref 82–98)
MONOCYTES # BLD AUTO: 0.6 K/UL (ref 0.3–1)
MONOCYTES NFR BLD: 7.6 % (ref 4–15)
NEUTROPHILS # BLD AUTO: 4.5 K/UL (ref 1.8–7.7)
NEUTROPHILS NFR BLD: 62.4 % (ref 38–73)
NONHDLC SERPL-MCNC: 132 MG/DL
NRBC BLD-RTO: 0 /100 WBC
OHS QRS DURATION: 92 MS
OHS QTC CALCULATION: 435 MS
PLATELET # BLD AUTO: 227 K/UL (ref 150–450)
PMV BLD AUTO: 10 FL (ref 9.2–12.9)
POTASSIUM SERPL-SCNC: 4.3 MMOL/L (ref 3.5–5.1)
PROT SERPL-MCNC: 7.3 G/DL (ref 6–8.4)
RBC # BLD AUTO: 4.6 M/UL (ref 4.6–6.2)
RSV AG BY MOLECULAR METHOD: NOT DETECTED
SARS-COV-2 RNA RESP QL NAA+PROBE: NOT DETECTED
SODIUM SERPL-SCNC: 140 MMOL/L (ref 136–145)
TRIGL SERPL-MCNC: 193 MG/DL (ref 30–150)
TROPONIN I SERPL DL<=0.01 NG/ML-MCNC: 0.01 NG/ML (ref 0–0.03)
TROPONIN I SERPL DL<=0.01 NG/ML-MCNC: 0.01 NG/ML (ref 0–0.03)
TROPONIN I SERPL DL<=0.01 NG/ML-MCNC: <0.006 NG/ML (ref 0–0.03)
WBC # BLD AUTO: 7.19 K/UL (ref 3.9–12.7)

## 2024-03-18 PROCEDURE — 94761 N-INVAS EAR/PLS OXIMETRY MLT: CPT

## 2024-03-18 PROCEDURE — G0378 HOSPITAL OBSERVATION PER HR: HCPCS

## 2024-03-18 PROCEDURE — 86803 HEPATITIS C AB TEST: CPT | Performed by: PHYSICIAN ASSISTANT

## 2024-03-18 PROCEDURE — 83880 ASSAY OF NATRIURETIC PEPTIDE: CPT

## 2024-03-18 PROCEDURE — 25000242 PHARM REV CODE 250 ALT 637 W/ HCPCS: Performed by: PHYSICIAN ASSISTANT

## 2024-03-18 PROCEDURE — 0241U SARS-COV2 (COVID) WITH FLU/RSV BY PCR: CPT | Performed by: PHYSICIAN ASSISTANT

## 2024-03-18 PROCEDURE — 93005 ELECTROCARDIOGRAM TRACING: CPT

## 2024-03-18 PROCEDURE — 87389 HIV-1 AG W/HIV-1&-2 AB AG IA: CPT | Performed by: PHYSICIAN ASSISTANT

## 2024-03-18 PROCEDURE — 85379 FIBRIN DEGRADATION QUANT: CPT

## 2024-03-18 PROCEDURE — 63600175 PHARM REV CODE 636 W HCPCS

## 2024-03-18 PROCEDURE — 80061 LIPID PANEL: CPT | Performed by: PHYSICIAN ASSISTANT

## 2024-03-18 PROCEDURE — 94640 AIRWAY INHALATION TREATMENT: CPT

## 2024-03-18 PROCEDURE — 93010 ELECTROCARDIOGRAM REPORT: CPT | Mod: ,,, | Performed by: INTERNAL MEDICINE

## 2024-03-18 PROCEDURE — 85025 COMPLETE CBC W/AUTO DIFF WBC: CPT

## 2024-03-18 PROCEDURE — 27000221 HC OXYGEN, UP TO 24 HOURS

## 2024-03-18 PROCEDURE — 80053 COMPREHEN METABOLIC PANEL: CPT

## 2024-03-18 PROCEDURE — 99285 EMERGENCY DEPT VISIT HI MDM: CPT | Mod: 25

## 2024-03-18 PROCEDURE — 25000003 PHARM REV CODE 250: Performed by: PHYSICIAN ASSISTANT

## 2024-03-18 PROCEDURE — 25000242 PHARM REV CODE 250 ALT 637 W/ HCPCS: Performed by: EMERGENCY MEDICINE

## 2024-03-18 PROCEDURE — 25500020 PHARM REV CODE 255: Performed by: EMERGENCY MEDICINE

## 2024-03-18 PROCEDURE — 84484 ASSAY OF TROPONIN QUANT: CPT

## 2024-03-18 PROCEDURE — 84484 ASSAY OF TROPONIN QUANT: CPT | Mod: 91 | Performed by: PHYSICIAN ASSISTANT

## 2024-03-18 PROCEDURE — S4991 NICOTINE PATCH NONLEGEND: HCPCS | Performed by: PHYSICIAN ASSISTANT

## 2024-03-18 RX ORDER — ONDANSETRON HYDROCHLORIDE 2 MG/ML
4 INJECTION, SOLUTION INTRAVENOUS EVERY 8 HOURS PRN
Status: DISCONTINUED | OUTPATIENT
Start: 2024-03-18 | End: 2024-03-20 | Stop reason: HOSPADM

## 2024-03-18 RX ORDER — PREDNISONE 20 MG/1
40 TABLET ORAL
Status: COMPLETED | OUTPATIENT
Start: 2024-03-18 | End: 2024-03-18

## 2024-03-18 RX ORDER — CLONAZEPAM 0.5 MG/1
1 TABLET ORAL
Status: DISCONTINUED | OUTPATIENT
Start: 2024-03-18 | End: 2024-03-19

## 2024-03-18 RX ORDER — PREDNISONE 20 MG/1
40 TABLET ORAL DAILY
Status: DISCONTINUED | OUTPATIENT
Start: 2024-03-19 | End: 2024-03-19

## 2024-03-18 RX ORDER — LISINOPRIL 10 MG/1
10 TABLET ORAL DAILY
Status: DISCONTINUED | OUTPATIENT
Start: 2024-03-18 | End: 2024-03-20 | Stop reason: HOSPADM

## 2024-03-18 RX ORDER — CLONAZEPAM 0.5 MG/1
1 TABLET ORAL 2 TIMES DAILY PRN
Status: DISCONTINUED | OUTPATIENT
Start: 2024-03-18 | End: 2024-03-20 | Stop reason: HOSPADM

## 2024-03-18 RX ORDER — ACETAMINOPHEN 325 MG/1
650 TABLET ORAL EVERY 4 HOURS PRN
Status: DISCONTINUED | OUTPATIENT
Start: 2024-03-18 | End: 2024-03-20 | Stop reason: HOSPADM

## 2024-03-18 RX ORDER — ALBUTEROL SULFATE 2.5 MG/.5ML
2.5 SOLUTION RESPIRATORY (INHALATION)
Status: COMPLETED | OUTPATIENT
Start: 2024-03-18 | End: 2024-03-18

## 2024-03-18 RX ORDER — SODIUM CHLORIDE 0.9 % (FLUSH) 0.9 %
10 SYRINGE (ML) INJECTION
Status: DISCONTINUED | OUTPATIENT
Start: 2024-03-18 | End: 2024-03-20 | Stop reason: HOSPADM

## 2024-03-18 RX ORDER — IPRATROPIUM BROMIDE AND ALBUTEROL SULFATE 2.5; .5 MG/3ML; MG/3ML
3 SOLUTION RESPIRATORY (INHALATION) EVERY 4 HOURS
Status: DISCONTINUED | OUTPATIENT
Start: 2024-03-18 | End: 2024-03-20 | Stop reason: HOSPADM

## 2024-03-18 RX ORDER — IBUPROFEN 200 MG
1 TABLET ORAL DAILY
Status: DISCONTINUED | OUTPATIENT
Start: 2024-03-18 | End: 2024-03-20 | Stop reason: HOSPADM

## 2024-03-18 RX ORDER — ASPIRIN 81 MG/1
81 TABLET ORAL DAILY
Status: DISCONTINUED | OUTPATIENT
Start: 2024-03-18 | End: 2024-03-20 | Stop reason: HOSPADM

## 2024-03-18 RX ORDER — NITROGLYCERIN 0.4 MG/1
0.4 TABLET SUBLINGUAL EVERY 5 MIN PRN
Status: DISCONTINUED | OUTPATIENT
Start: 2024-03-18 | End: 2024-03-20 | Stop reason: HOSPADM

## 2024-03-18 RX ORDER — HYDROCODONE BITARTRATE AND ACETAMINOPHEN 10; 325 MG/1; MG/1
1 TABLET ORAL EVERY 6 HOURS PRN
Status: DISCONTINUED | OUTPATIENT
Start: 2024-03-18 | End: 2024-03-20 | Stop reason: HOSPADM

## 2024-03-18 RX ADMIN — IPRATROPIUM BROMIDE AND ALBUTEROL SULFATE 3 ML: .5; 3 SOLUTION RESPIRATORY (INHALATION) at 07:03

## 2024-03-18 RX ADMIN — Medication 1 PATCH: at 01:03

## 2024-03-18 RX ADMIN — IOHEXOL 100 ML: 350 INJECTION, SOLUTION INTRAVENOUS at 12:03

## 2024-03-18 RX ADMIN — HYDROCODONE BITARTRATE AND ACETAMINOPHEN 1 TABLET: 10; 325 TABLET ORAL at 01:03

## 2024-03-18 RX ADMIN — CLONAZEPAM 0.5 MG: 0.5 TABLET ORAL at 01:03

## 2024-03-18 RX ADMIN — IPRATROPIUM BROMIDE AND ALBUTEROL SULFATE 3 ML: .5; 3 SOLUTION RESPIRATORY (INHALATION) at 03:03

## 2024-03-18 RX ADMIN — PREDNISONE 40 MG: 20 TABLET ORAL at 11:03

## 2024-03-18 RX ADMIN — HYDROCODONE BITARTRATE AND ACETAMINOPHEN 1 TABLET: 10; 325 TABLET ORAL at 09:03

## 2024-03-18 RX ADMIN — ALBUTEROL SULFATE 2.5 MG: 2.5 SOLUTION RESPIRATORY (INHALATION) at 07:03

## 2024-03-18 NOTE — ED PROVIDER NOTES
Encounter Date: 3/18/2024       History     Chief Complaint   Patient presents with    Shortness of Breath     C/o SOB x 4 days. Reports not being able to walk long distances, endorses some bilateral arm numbness     43-year-old with past medical history of CHF, CAD with coronary stents in place, hypertension, obesity, anxiety, ablation on Xarelto, and smoker presents with chief complaint of dyspnea on exertion onset asked 3-4 days.  Denies any shortness of breath at rest or currently.  Denies chest pain, leg swelling, URI symptoms, nausea, vomiting, abdominal pain.  He also admits to bilateral arm numbness.  Denies any extremity weakness, dysphagia, dysarthria, facial drooping, or any other complaints at this time.  Reports he is compliant with his medications.  He does consistently smoke a pack per day having difficulty quitting.  He has not on any home oxygen.    The history is provided by the patient and medical records.     Review of patient's allergies indicates:  No Known Allergies  Past Medical History:   Diagnosis Date    Anxiety     Carbon monoxide poisoning     Caused a seizure    Chronic combined systolic and diastolic heart failure 6/22/2018    Chronic systolic congestive heart failure 11/15/2019    Coronary artery disease     History of heart artery stent 2013    Hypertension     Ischemic cardiomyopathy 3/16/2022    EF 35% improved to 50%    NSTEMI (non-ST elevated myocardial infarction) 2/10/2013    Obesity     YOJANA (obstructive sleep apnea)     Paroxysmal A-fib 2/9/2013     Past Surgical History:   Procedure Laterality Date    CORONARY ANGIOPLASTY      LEFT HEART CATHETERIZATION Right 6/25/2018    Procedure: Left heart cath;  Surgeon: Dieter Vargas MD;  Location: [facility] CATH LAB;  Service: Cardiology;  Laterality: Right;     Family History   Problem Relation Age of Onset    Heart disease Mother     Heart disease Father      Social History     Tobacco Use    Smoking status: Every Day     Current  packs/day: 1.25     Average packs/day: 1.3 packs/day for 27.2 years (34.0 ttl pk-yrs)     Types: Cigarettes     Start date: 01/1997    Smokeless tobacco: Never   Substance Use Topics    Alcohol use: Not Currently     Comment: occasional. none in 2 months    Drug use: Yes     Types: Marijuana     Review of Systems   Reason unable to perform ROS: See HPI.       Physical Exam     Initial Vitals [03/18/24 0649]   BP Pulse Resp Temp SpO2   (!) 165/86 101 20 98.1 °F (36.7 °C) (!) 94 %      MAP       --         Physical Exam    Vitals reviewed.  Constitutional: He appears well-developed and well-nourished. He is not diaphoretic. No distress.   HENT:   Head: Normocephalic and atraumatic.   Mouth/Throat: Uvula is midline and oropharynx is clear and moist.   Eyes: Conjunctivae and EOM are normal. Pupils are equal, round, and reactive to light.   Neck: Neck supple.   Cardiovascular:  Normal rate, regular rhythm and normal heart sounds.     Exam reveals no gallop and no friction rub.       No murmur heard.  No lower extremity edema   Pulmonary/Chest: No respiratory distress. He has decreased breath sounds. He has no wheezes. He has no rhonchi. He has no rales.   Decreased breath sounds bilaterally   Musculoskeletal:      Cervical back: Neck supple.     Neurological: He is alert and oriented to person, place, and time. He has normal strength. No cranial nerve deficit or sensory deficit.   Psychiatric: He has a normal mood and affect.         ED Course   Procedures  Labs Reviewed   CBC W/ AUTO DIFFERENTIAL - Abnormal; Notable for the following components:       Result Value    MCH 32.0 (*)     Immature Granulocytes 0.6 (*)     All other components within normal limits    Narrative:     Release to patient->Immediate   HIV 1 / 2 ANTIBODY    Narrative:     Release to patient->Immediate   HEPATITIS C ANTIBODY    Narrative:     Release to patient->Immediate   COMPREHENSIVE METABOLIC PANEL    Narrative:     Release to  "patient->Immediate   B-TYPE NATRIURETIC PEPTIDE    Narrative:     Release to patient->Immediate   TROPONIN I    Narrative:     Release to patient->Immediate   D DIMER, QUANTITATIVE   TROPONIN I        ECG Results              EKG 12-lead (Final result)        Collection Time Result Time QRS Duration OHS QTC Calculation    03/18/24 06:57:25 03/18/24 09:50:21 92 435                     Final result by Interface, Lab In [facility] (03/18/24 09:50:33)                   Narrative:    Test Reason : R06.02,    Vent. Rate : 092 BPM     Atrial Rate : 092 BPM     P-R Int : 150 ms          QRS Dur : 092 ms      QT Int : 352 ms       P-R-T Axes : 059 080 047 degrees     QTc Int : 435 ms    Normal sinus rhythm  Anteroseptal infarct (cited on or before 18-MAR-2024)  Abnormal ECG  When compared with ECG of 08-AUG-2023 07:59,  Questionable change in initial forces of Anterior leads  Confirmed by Burt BATES MD ([id number]) on 3/18/2024 9:50:19 AM    Referred By: [email or web address]   SELF           Confirmed By:Burt BATES MD                                  Imaging Results              X-Ray Chest PA And Lateral (Final result)  Result time 03/18/24 08:13:13      Final result by Dae Isbell MD (03/18/24 08:13:13)                   Impression:      No acute cardiopulmonary finding.      Electronically signed by: Dae Isbell MD  Date:    03/18/2024  Time:    08:13               Narrative:    EXAMINATION:  XR CHEST PA AND LATERAL    CLINICAL HISTORY:  Provided history is "Chest Pain;  ".    TECHNIQUE:  Frontal and lateral views of the chest were performed.    COMPARISON:  07/13/2023.    FINDINGS:  Cardiac wires overlie the chest.  Cardiomediastinal silhouette is not enlarged.  No focal consolidation.  No sizable pleural effusion.  No pneumothorax.                                       Medications   predniSONE tablet 40 mg (has no administration in time range)   albuterol sulfate nebulizer solution 2.5 mg (2.5 mg Nebulization Given 3/18/24 " 0751)     Medical Decision Making  Amount and/or Complexity of Data Reviewed  Labs: ordered. Decision-making details documented in ED Course.  Radiology: ordered. Decision-making details documented in ED Course.    Risk  OTC drugs.  Prescription drug management.         APC / Resident Notes:   Emergent evaluation of 43-year-old male with complaint of dyspnea on exertion onset 3-4 days.  Patient mildly tachycardic and hypoxic with other vitals stable.  It is not appear tachypneic with no labored breathing.  See physical exam findings above.  Regarding bilateral numbness, no focal neuro deficits on examination as well as bilateral symptoms, therefore doubt CVA.  Doubt PE given patient is compliant with Xarelto.  LEVI possibly related to CHF. Will obtain labs and CXR. Will placed on O2.    My differential diagnoses include but are not limited to:   CHF exacerbation, ACS, pneumonia pleural effusion, pneumothorax, PE, electrolyte abnormality    See ED course.  I have reviewed the patient's records and discussed with my supervising physician.       Attending Attestation:             Attending ED Notes:   I have reviewed and concur with the PA's history, physical, assessment, and plan.  I have personally interviewed and examined the patient at bedside.  See below addendum for my evaluation and additional findings.     I had a face-to-face evaluation with this patient for exertional shortness of breath x 3 days with a dry cough.  Denies leg swelling, abdominal distention or chest pain.  No fever or chills.  Well-appearing on exam.    Decreased breath sounds bilaterally, no rhonchi or rales  Abdomen soft, nontender.    No peripheral edema    Plan for labs, EKG, chest x-ray.  Labs overall unremarkable.  EKG without acute ischemic changes.  Chest x-ray without pneumonia.    Patient continues to have borderline hypoxemia, etiology unsure at this time.  Will place in EDOU for further workup including CTA, echo, troponin trend,  scheduled nebs.  If improved and workup unremarkable, I suspect he will be discharged.    PRETTY Garcia MD  Staff ED Physician  03/18/2024 1:54 PM            ED Course as of 03/18/24 1353   Mon Mar 18, 2024   0821 Comprehensive metabolic panel  WNL. [KB]   0821 CBC auto differential(!)  No leukocytosis or anemia [KB]   0821 BNP: 19 [KB]   0821 Troponin I: <0.006 [KB]   0821 X-Ray Chest PA And Lateral  Impression:     No acute cardiopulmonary finding. [KB]   0841 SpO2: 95 %  Satting well on 1 L O2 NC  [KB]   0851 Work up unremarkable, will d-dimer for concerns for PE but low suspicion/risk given on Xarelto [KB]   0939 D-Dimer: 0.23 [KB]   1019 ambulatory O2 was 93% on RA, 103 HR [KB]   1126 Unclear etiology of mild hypoxia.  Discussed the results with patient. Will plan to place in EDOU for scheduled  breathing treatments, trend troponin, and will CTA to assess for occult pneumonia or other abnormality. Will plan for echocardiogram tomorrow given patient's history with new onset mild hypoxia. Will treat with prednisone for possible COPD [KB]      ED Course User Index  [KB] Xena Lema PA-C                           Clinical Impression:  Final diagnoses:  [R06.02] SOB (shortness of breath)  [R07.9] Chest pain                 Arlin Garcia MD  03/18/24 1354       Xean Lema PA-C  03/18/24 [id number]

## 2024-03-18 NOTE — ED TRIAGE NOTES
Arrives via POV with c/o of sob and bilateral arm numbness starting 3 days. Endorses worsening symptoms with exertion. Pt states a hx of heart attack with 4 stents. Endorses general weakness. Pt denies CP.

## 2024-03-18 NOTE — H&P
ED Observation Unit  History and Physical      I assumed care of this patient from the Main ED at onset of observation time, 11:28 on 03/18/2024.       History of Present Illness:  43-year-old with past medical history of CHF, CAD with coronary stents in place, hypertension, obesity, anxiety, atrial fibrillation on Xarelto, and smoker presents with chief complaint of dyspnea on exertion onset 3-4 days ago. Feels similar to when he had a heart attack in the past. Denies any shortness of breath at rest or currently. Denies chest pain, leg swelling, URI symptoms, nausea, vomiting, abdominal pain. He also admits to bilateral arm numbness. Reports waking this morning with numbness and tingling from bilateral elbow to hands. Resolved after a few minutes. Has happened in the past. Denies any extremity weakness, dysphagia, dysarthria, facial drooping, or any other complaints at this time. Reports he is compliant with his medications. He does consistently smoke a pack per day. He is not on any home oxygen. Of note, the patient states that his wife frequently wakes him up in the middle of the night due to his snoring/apnea. He inquires about a sleep study.     I reviewed the ED Provider Note dated 3/18/24 prior to my evaluation of this patient.  I reviewed all labs and imaging performed in the Main ED, prior to patient being placed in Observation. Patient was placed in the ED Observation Unit for Dyspnea on exertion.    PMHx   Past Medical History:   Diagnosis Date    Anxiety     Carbon monoxide poisoning     Caused a seizure    Chronic combined systolic and diastolic heart failure 6/22/2018    Chronic systolic congestive heart failure 11/15/2019    Coronary artery disease     History of heart artery stent 2013    Hypertension     Ischemic cardiomyopathy 3/16/2022    EF 35% improved to 50%    NSTEMI (non-ST elevated myocardial infarction) 2/10/2013    Obesity     YOJANA (obstructive sleep apnea)     Paroxysmal A-fib 2/9/2013       Past Surgical History:   Procedure Laterality Date    CORONARY ANGIOPLASTY      LEFT HEART CATHETERIZATION Right 6/25/2018    Procedure: Left heart cath;  Surgeon: Dieter Vargas MD;  Location: [facility] CATH LAB;  Service: Cardiology;  Laterality: Right;        Family Hx   Family History   Problem Relation Age of Onset    Heart disease Mother     Heart disease Father         Social Hx   Social History     Socioeconomic History    Marital status:    Tobacco Use    Smoking status: Every Day     Current packs/day: 1.25     Average packs/day: 1.3 packs/day for 27.2 years (34.0 ttl pk-yrs)     Types: Cigarettes     Start date: 01/1997    Smokeless tobacco: Never   Substance and Sexual Activity    Alcohol use: Not Currently     Comment: occasional. none in 2 months    Drug use: Yes     Types: Marijuana    Sexual activity: Yes     Partners: Female        Vital Signs   Vitals:    03/18/24 1002 03/18/24 1003 03/18/24 1203 03/18/24 1205   BP: (!) 140/69  138/63    Pulse:  84 84    Resp:  19 19    Temp:       TempSrc:       SpO2:  96%  95%   Weight:       Height:            Review of Systems  Review of Systems   Respiratory:  Positive for cough (chronic, unchanged) and shortness of breath.    Cardiovascular:  Negative for chest pain.       Physical Exam  Physical Exam  Vitals and nursing note reviewed.   Constitutional:       General: He is not in acute distress.     Appearance: He is well-developed and normal weight. He is not ill-appearing, toxic-appearing or diaphoretic.   HENT:      Head: Normocephalic and atraumatic.      Right Ear: External ear normal.      Left Ear: External ear normal.      Nose: Nose normal.      Mouth/Throat:      Mouth: Mucous membranes are moist.   Eyes:      Extraocular Movements: Extraocular movements intact.      Conjunctiva/sclera: Conjunctivae normal.      Pupils: Pupils are equal, round, and reactive to light.   Cardiovascular:      Rate and Rhythm: Normal rate and regular rhythm.       Heart sounds: Normal heart sounds.   Pulmonary:      Effort: Pulmonary effort is normal.      Breath sounds: Decreased breath sounds present.   Abdominal:      General: Bowel sounds are normal.      Palpations: Abdomen is soft.      Tenderness: There is no abdominal tenderness.   Musculoskeletal:         General: Normal range of motion.      Cervical back: Normal range of motion and neck supple.      Right lower leg: No edema.      Left lower leg: No edema.   Skin:     General: Skin is warm and dry.      Capillary Refill: Capillary refill takes less than 2 seconds.   Neurological:      General: No focal deficit present.      Mental Status: He is alert and oriented to person, place, and time.      Cranial Nerves: No cranial nerve deficit.   Psychiatric:         Mood and Affect: Mood normal. Mood is not anxious.         Behavior: Behavior normal. Behavior is not agitated.         Thought Content: Thought content normal.         Judgment: Judgment normal.         Medications:   Scheduled Meds:   albuterol-ipratropium  3 mL Nebulization Q4H    aspirin  81 mg Oral Daily    lisinopriL  10 mg Oral Daily    nicotine  1 patch Transdermal Daily    [START ON 3/19/2024] predniSONE  40 mg Oral Daily    rivaroxaban  20 mg Oral Daily with dinner     Continuous Infusions:  PRN Meds:.      Assessment/Plan:  Dyspnea on Exertion  -Oxygen saturation of about 93% with exertion, 95% at rest   -patient appears comfortable without increased work of breathing    -etiology unclear   -CTA PE study pending - unlikely as he reports compliance with Xarelto and has negative D dimer   -trend troponin   -stress echocardiogram ordered   -will schedule duo nebs and prednisone as the patient likely had underlying COPD with smoking history. No significant wheezing appreciated on exam.    -reports snoring, periods of apnea at night. Will refer for sleep study upon discharge.   -would likely benefit from pulmonology consult upon discharge as well.      Case was discussed with the ED provider, JOHN Lema .

## 2024-03-18 NOTE — Clinical Note
Diagnosis: SOB (shortness of breath) [794390]   Future Attending Provider: KAIDEN MOYER [[id number]]   Is the patient being sent to ED Observation?: Yes   Special Needs:: No Special Needs [1]

## 2024-03-18 NOTE — PROGRESS NOTES
ED Observation Unit  Progress Note      HPI   History of Present Illness:  43-year-old with past medical history of CHF, CAD with coronary stents in place, hypertension, obesity, anxiety, atrial fibrillation on Xarelto, and smoker presents with chief complaint of dyspnea on exertion onset 3-4 days ago. Feels similar to when he had a heart attack in the past. Denies any shortness of breath at rest or currently. Denies chest pain, leg swelling, URI symptoms, nausea, vomiting, abdominal pain. He also admits to bilateral arm numbness. Reports waking this morning with numbness and tingling from bilateral elbow to hands. Resolved after a few minutes. Has happened in the past. Denies any extremity weakness, dysphagia, dysarthria, facial drooping, or any other complaints at this time. Reports he is compliant with his medications. He does consistently smoke a pack per day. He is not on any home oxygen. Of note, the patient states that his wife frequently wakes him up in the middle of the night due to his snoring/apnea. He inquires about a sleep study.     Interval History   He ED observation for dyspnea on exertion.  Mildly hypoxic in the ED 92-94% however reassuring ambulatory sat in the ED. on my reexamine he is resting comfortably satting 95%.  Pending stress echo tomorrow.  Will continue to monitor.    PMHx   Past Medical History:   Diagnosis Date    Anxiety     Carbon monoxide poisoning     Caused a seizure    Chronic combined systolic and diastolic heart failure 6/22/2018    Chronic systolic congestive heart failure 11/15/2019    Coronary artery disease     History of heart artery stent 2013    Hypertension     Ischemic cardiomyopathy 3/16/2022    EF 35% improved to 50%    NSTEMI (non-ST elevated myocardial infarction) 2/10/2013    Obesity     YOJANA (obstructive sleep apnea)     Paroxysmal A-fib 2/9/2013      Past Surgical History:   Procedure Laterality Date    CORONARY ANGIOPLASTY      LEFT HEART CATHETERIZATION Right  6/25/2018    Procedure: Left heart cath;  Surgeon: Dieter Vargas MD;  Location: [facility] CATH LAB;  Service: Cardiology;  Laterality: Right;        Family Hx   Family History   Problem Relation Age of Onset    Heart disease Mother     Heart disease Father         Social Hx   Social History     Socioeconomic History    Marital status:    Tobacco Use    Smoking status: Every Day     Current packs/day: 1.25     Average packs/day: 1.3 packs/day for 27.2 years (34.0 ttl pk-yrs)     Types: Cigarettes     Start date: 01/1997    Smokeless tobacco: Never   Substance and Sexual Activity    Alcohol use: Not Currently     Comment: occasional. none in 2 months    Drug use: Yes     Types: Marijuana    Sexual activity: Yes     Partners: Female        Vital Signs   Vitals:    03/18/24 1205 03/18/24 1321 03/18/24 1526 03/18/24 1659   BP:       Pulse:   80 91   Resp:  (!) 22 16    Temp:       TempSrc:       SpO2: 95%  (!) 92% 95%   Weight:       Height:            Review of Systems  Review of Systems   Constitutional:  Negative for chills and fever.   HENT:  Negative for sore throat.    Respiratory:  Positive for shortness of breath.    Cardiovascular:  Negative for chest pain and leg swelling.   Gastrointestinal:  Negative for nausea and vomiting.   Genitourinary: Negative.    Neurological:  Negative for weakness.       Brief Physical Exam/Reassessment   Physical Exam  Vitals and nursing note reviewed.   Constitutional:       Appearance: He is well-developed.   HENT:      Head: Normocephalic and atraumatic.   Eyes:      Pupils: Pupils are equal, round, and reactive to light.   Cardiovascular:      Rate and Rhythm: Normal rate.   Pulmonary:      Effort: Pulmonary effort is normal.      Breath sounds: Normal breath sounds.   Musculoskeletal:         General: Normal range of motion.      Cervical back: Normal range of motion.   Skin:     General: Skin is warm and dry.      Capillary Refill: Capillary refill takes less than 2  seconds.   Neurological:      General: No focal deficit present.      Mental Status: He is alert and oriented to person, place, and time.         Labs/Imaging   Labs Reviewed   CBC W/ AUTO DIFFERENTIAL - Abnormal; Notable for the following components:       Result Value    MCH 32.0 (*)     Immature Granulocytes 0.6 (*)     All other components within normal limits    Narrative:     Release to patient->Immediate   LIPID PANEL - Abnormal; Notable for the following components:    Triglycerides 193 (*)     HDL 32 (*)     HDL/Cholesterol Ratio 19.5 (*)     Total Cholesterol/HDL Ratio 5.1 (*)     All other components within normal limits   HIV 1 / 2 ANTIBODY    Narrative:     Release to patient->Immediate   HEPATITIS C ANTIBODY    Narrative:     Release to patient->Immediate   COMPREHENSIVE METABOLIC PANEL    Narrative:     Release to patient->Immediate   B-TYPE NATRIURETIC PEPTIDE    Narrative:     Release to patient->Immediate   TROPONIN I    Narrative:     Release to patient->Immediate   D DIMER, QUANTITATIVE   TROPONIN I   SARS-COV2 (COVID) WITH FLU/RSV BY PCR   TROPONIN I      Imaging Results              CTA Chest Non-Coronary (PE Studies) (Final result)  Result time 03/18/24 12:42:46      Final result by Casimiro Cade MD (03/18/24 12:42:46)                   Impression:      1. Bolus timing is insufficient for evaluation of pulmonary thromboembolism.  Given current bolus timing and motion artifact, no definite pulmonary thromboembolism within the main pulmonary arteries, distal embolism cannot be definitively excluded.  Correlation of these findings with D-dimer and/or lower extremity ultrasound as warranted.  2. Possible hepatic steatosis, correlation with LFTs recommended.  3. No acute pulmonary process.  4. Please see above for additional findings.      Electronically signed by: Casimiro Cade MD  Date:    03/18/2024  Time:    12:42               Narrative:    EXAMINATION:  CTA CHEST NON CORONARY (PE  STUDIES)    CLINICAL HISTORY:  Pulmonary embolism (PE) suspected, neg D-dimer;    TECHNIQUE:  Low dose axial images, sagittal and coronal reformations were obtained from the thoracic inlet to the lung bases following the IV administration of 100 mL of Omnipaque 350.  Contrast timing was optimized to evaluate the pulmonary arteries.  MIP images were performed.    COMPARISON:  Radiograph 03/18/2024, CTA chest 02/09/2013    FINDINGS:  The structures at the base of the neck are unremarkable.  No significant mediastinal lymphadenopathy.  The heart is not enlarged.  No pericardial effusion.  The thoracic aorta tapers normally.  There is calcification in the distribution of the coronary arteries.  The visualized portions of the left kidney, adrenal glands, spleen, pancreas and gallbladder are grossly unremarkable.  The liver is hypoattenuating suggesting possible steatosis versus contrast phase, correlation with LFTs as warranted.  There is a minimal hiatal hernia.    Motion artifact limits evaluation of the airways and pulmonary parenchyma.  Allowing for this, the airways are patent.  There is bilateral basilar dependent atelectasis.  No large focal consolidation.  No pneumothorax.  No pleural effusion.    Bolus timing is insufficient for evaluation of pulmonary thromboembolism noting weak opacification of the pulmonary arteries.  No large central pulmonary thromboembolism.  No definite pulmonary arterial filling defect to the proximal lobar branches however distal embolism cannot be excluded on the basis of this exam.  Examination is further limited secondary to motion artifact.  Correlation of these findings with D-dimer and/or lower extremity ultrasound as warranted.    There are degenerative changes of the spine.  No significant axillary lymphadenopathy.                                       X-Ray Chest PA And Lateral (Final result)  Result time 03/18/24 08:13:13      Final result by Dae Isbell MD (03/18/24  "08:13:13)                   Impression:      No acute cardiopulmonary finding.      Electronically signed by: Dae Isbell MD  Date:    03/18/2024  Time:    08:13               Narrative:    EXAMINATION:  XR CHEST PA AND LATERAL    CLINICAL HISTORY:  Provided history is "Chest Pain;  ".    TECHNIQUE:  Frontal and lateral views of the chest were performed.    COMPARISON:  07/13/2023.    FINDINGS:  Cardiac wires overlie the chest.  Cardiomediastinal silhouette is not enlarged.  No focal consolidation.  No sizable pleural effusion.  No pneumothorax.                                       I reviewed all labs, imaging, EKGs.     Plan   Dyspnea on Exertion  -Oxygen saturation of about 93% with exertion, 95% at rest   -patient appears comfortable without increased work of breathing    -etiology unclear   -CTA PE study insufficient for evaluation for PE due to bolus timing and motion artifact. No definite pulmonary thromboembolism within the main pulmonary arteries   Negative D-dimer in the ED. He is compliant with his Xarelto.  Will order lower extremity ultrasound.    -trend troponin   -stress echocardiogram ordered   -will schedule duo nebs and prednisone as the patient likely had underlying COPD with smoking history. No significant wheezing appreciated on exam.    -reports snoring, periods of apnea at night. Will refer for sleep study upon discharge.   -would likely benefit from pulmonology consult upon discharge as well.      I have discussed this case with  PREM Garcia PA-C.     "

## 2024-03-19 PROBLEM — J44.1 COPD EXACERBATION: Status: ACTIVE | Noted: 2024-03-19

## 2024-03-19 LAB
ASCENDING AORTA: 2.76 CM
BSA FOR ECHO PROCEDURE: 2.39 M2
CV ECHO LV RWT: 0.37 CM
CV STRESS BASE HR: 84 BPM
DIASTOLIC BLOOD PRESSURE: 60 MMHG
DOP CALC LVOT AREA: 4.3 CM2
DOP CALC LVOT DIAMETER: 2.35 CM
DOP CALC LVOT PEAK VEL: 1.46 M/S
DOP CALC LVOT STROKE VOLUME: 106.73 CM3
DOP CALCLVOT PEAK VEL VTI: 24.62 CM
E WAVE DECELERATION TIME: 223.68 MSEC
E/A RATIO: 1.09
E/E' RATIO: 10.29 M/S
ECHO LV POSTERIOR WALL: 0.93 CM (ref 0.6–1.1)
EJECTION FRACTION: 55 %
FRACTIONAL SHORTENING: 32 % (ref 28–44)
INTERVENTRICULAR SEPTUM: 0.94 CM (ref 0.6–1.1)
IVRT: 59.94 MSEC
LA MAJOR: 5.26 CM
LA MINOR: 5.65 CM
LA WIDTH: 4.62 CM
LEFT ATRIUM SIZE: 4.33 CM
LEFT ATRIUM VOLUME INDEX: 40.8 ML/M2
LEFT ATRIUM VOLUME: 92.64 CM3
LEFT INTERNAL DIMENSION IN SYSTOLE: 3.41 CM (ref 2.1–4)
LEFT VENTRICLE DIASTOLIC VOLUME INDEX: 53.3 ML/M2
LEFT VENTRICLE DIASTOLIC VOLUME: 120.99 ML
LEFT VENTRICLE MASS INDEX: 75 G/M2
LEFT VENTRICLE SYSTOLIC VOLUME INDEX: 21.1 ML/M2
LEFT VENTRICLE SYSTOLIC VOLUME: 47.94 ML
LEFT VENTRICULAR INTERNAL DIMENSION IN DIASTOLE: 5.05 CM (ref 3.5–6)
LEFT VENTRICULAR MASS: 169.15 G
LV LATERAL E/E' RATIO: 10.8 M/S
LV SEPTAL E/E' RATIO: 9.82 M/S
MV A" WAVE DURATION": 9.13 MSEC
MV PEAK A VEL: 0.99 M/S
MV PEAK E VEL: 1.08 M/S
MV STENOSIS PRESSURE HALF TIME: 64.87 MS
MV VALVE AREA P 1/2 METHOD: 3.39 CM2
OHS CV CPX 1 MINUTE RECOVERY HEART RATE: 133 BPM
OHS CV CPX 85 PERCENT MAX PREDICTED HEART RATE MALE: 150
OHS CV CPX MAX PREDICTED HEART RATE: 176
OHS CV CPX PATIENT IS FEMALE: 0
OHS CV CPX PATIENT IS MALE: 1
OHS CV CPX PEAK DIASTOLIC BLOOD PRESSURE: 58 MMHG
OHS CV CPX PEAK HEAR RATE: 155 BPM
OHS CV CPX PEAK RATE PRESSURE PRODUCT: NORMAL
OHS CV CPX PEAK SYSTOLIC BLOOD PRESSURE: 165 MMHG
OHS CV CPX PERCENT MAX PREDICTED HEART RATE ACHIEVED: 88
OHS CV CPX RATE PRESSURE PRODUCT PRESENTING: NORMAL
OHS CV INITIAL DOSE: 10 MCG/KG/MIN
OHS CV PEAK DOSE: 40 MCG/KG/MIN
OHS LV EJECTION FRACTION SIMPSONS BIPLANE MOD: 50 %
PISA TR MAX VEL: 2.36 M/S
PULM VEIN S/D RATIO: 1.13
PV PEAK D VEL: 0.54 M/S
PV PEAK S VEL: 0.61 M/S
RA MAJOR: 5.32 CM
RA PRESSURE ESTIMATED: 3 MMHG
RA WIDTH: 3.77 CM
RIGHT VENTRICULAR END-DIASTOLIC DIMENSION: 3.18 CM
RV TB RVSP: 5 MMHG
SINUS: 3.09 CM
STJ: 2.35 CM
STRESS ST DEPRESSION: 0.5 MM
SYSTOLIC BLOOD PRESSURE: 127 MMHG
TDI LATERAL: 0.1 M/S
TDI SEPTAL: 0.11 M/S
TDI: 0.11 M/S
TR MAX PG: 22 MMHG
TRICUSPID ANNULAR PLANE SYSTOLIC EXCURSION: 3.5 CM
TV REST PULMONARY ARTERY PRESSURE: 25 MMHG
Z-SCORE OF LEFT VENTRICULAR DIMENSION IN END DIASTOLE: -5.13
Z-SCORE OF LEFT VENTRICULAR DIMENSION IN END SYSTOLE: -3.19

## 2024-03-19 PROCEDURE — 94761 N-INVAS EAR/PLS OXIMETRY MLT: CPT

## 2024-03-19 PROCEDURE — 25000003 PHARM REV CODE 250: Performed by: PHYSICIAN ASSISTANT

## 2024-03-19 PROCEDURE — 25500020 PHARM REV CODE 255: Performed by: STUDENT IN AN ORGANIZED HEALTH CARE EDUCATION/TRAINING PROGRAM

## 2024-03-19 PROCEDURE — 63600150 PHARM REV CODE 636: Performed by: STUDENT IN AN ORGANIZED HEALTH CARE EDUCATION/TRAINING PROGRAM

## 2024-03-19 PROCEDURE — 63600175 PHARM REV CODE 636 W HCPCS: Performed by: PHYSICIAN ASSISTANT

## 2024-03-19 PROCEDURE — G0378 HOSPITAL OBSERVATION PER HR: HCPCS

## 2024-03-19 PROCEDURE — 25000242 PHARM REV CODE 250 ALT 637 W/ HCPCS: Performed by: PHYSICIAN ASSISTANT

## 2024-03-19 PROCEDURE — 99900035 HC TECH TIME PER 15 MIN (STAT)

## 2024-03-19 PROCEDURE — 94640 AIRWAY INHALATION TREATMENT: CPT | Mod: XB

## 2024-03-19 PROCEDURE — S4991 NICOTINE PATCH NONLEGEND: HCPCS | Performed by: PHYSICIAN ASSISTANT

## 2024-03-19 PROCEDURE — 94640 AIRWAY INHALATION TREATMENT: CPT

## 2024-03-19 RX ORDER — IBUPROFEN 200 MG
24 TABLET ORAL
Status: DISCONTINUED | OUTPATIENT
Start: 2024-03-19 | End: 2024-03-20 | Stop reason: HOSPADM

## 2024-03-19 RX ORDER — GLUCAGON 1 MG
1 KIT INJECTION
Status: DISCONTINUED | OUTPATIENT
Start: 2024-03-19 | End: 2024-03-20 | Stop reason: HOSPADM

## 2024-03-19 RX ORDER — FLUTICASONE FUROATE AND VILANTEROL 200; 25 UG/1; UG/1
1 POWDER RESPIRATORY (INHALATION) DAILY
Status: DISCONTINUED | OUTPATIENT
Start: 2024-03-19 | End: 2024-03-20 | Stop reason: HOSPADM

## 2024-03-19 RX ORDER — METHYLPREDNISOLONE SOD SUCC 125 MG
125 VIAL (EA) INJECTION
Status: DISCONTINUED | OUTPATIENT
Start: 2024-03-19 | End: 2024-03-20 | Stop reason: HOSPADM

## 2024-03-19 RX ORDER — PROCHLORPERAZINE EDISYLATE 5 MG/ML
5 INJECTION INTRAMUSCULAR; INTRAVENOUS EVERY 6 HOURS PRN
Status: DISCONTINUED | OUTPATIENT
Start: 2024-03-19 | End: 2024-03-20 | Stop reason: HOSPADM

## 2024-03-19 RX ORDER — NALOXONE HCL 0.4 MG/ML
0.02 VIAL (ML) INJECTION
Status: DISCONTINUED | OUTPATIENT
Start: 2024-03-19 | End: 2024-03-20 | Stop reason: HOSPADM

## 2024-03-19 RX ORDER — ASPIRIN 81 MG/1
81 TABLET ORAL DAILY
COMMUNITY

## 2024-03-19 RX ORDER — ALUMINUM HYDROXIDE, MAGNESIUM HYDROXIDE, AND SIMETHICONE 1200; 120; 1200 MG/30ML; MG/30ML; MG/30ML
30 SUSPENSION ORAL 4 TIMES DAILY PRN
Status: DISCONTINUED | OUTPATIENT
Start: 2024-03-19 | End: 2024-03-20 | Stop reason: HOSPADM

## 2024-03-19 RX ORDER — DOBUTAMINE HYDROCHLORIDE 100 MG/100ML
10 INJECTION INTRAVENOUS
Status: COMPLETED | OUTPATIENT
Start: 2024-03-19 | End: 2024-03-19

## 2024-03-19 RX ORDER — IPRATROPIUM BROMIDE AND ALBUTEROL SULFATE 2.5; .5 MG/3ML; MG/3ML
3 SOLUTION RESPIRATORY (INHALATION) EVERY 4 HOURS PRN
Status: DISCONTINUED | OUTPATIENT
Start: 2024-03-19 | End: 2024-03-20 | Stop reason: HOSPADM

## 2024-03-19 RX ORDER — AZITHROMYCIN 250 MG/1
500 TABLET, FILM COATED ORAL ONCE
Status: DISCONTINUED | OUTPATIENT
Start: 2024-03-19 | End: 2024-03-20 | Stop reason: HOSPADM

## 2024-03-19 RX ORDER — METHYLPREDNISOLONE SOD SUCC 125 MG
125 VIAL (EA) INJECTION
Status: CANCELLED | OUTPATIENT
Start: 2024-03-19

## 2024-03-19 RX ORDER — AZITHROMYCIN 250 MG/1
250 TABLET, FILM COATED ORAL DAILY
Status: DISCONTINUED | OUTPATIENT
Start: 2024-03-20 | End: 2024-03-20 | Stop reason: HOSPADM

## 2024-03-19 RX ORDER — IBUPROFEN 200 MG
16 TABLET ORAL
Status: DISCONTINUED | OUTPATIENT
Start: 2024-03-19 | End: 2024-03-20 | Stop reason: HOSPADM

## 2024-03-19 RX ORDER — TALC
9 POWDER (GRAM) TOPICAL NIGHTLY PRN
Status: DISCONTINUED | OUTPATIENT
Start: 2024-03-19 | End: 2024-03-20 | Stop reason: HOSPADM

## 2024-03-19 RX ADMIN — IPRATROPIUM BROMIDE AND ALBUTEROL SULFATE 3 ML: .5; 3 SOLUTION RESPIRATORY (INHALATION) at 12:03

## 2024-03-19 RX ADMIN — IPRATROPIUM BROMIDE AND ALBUTEROL SULFATE 3 ML: .5; 3 SOLUTION RESPIRATORY (INHALATION) at 07:03

## 2024-03-19 RX ADMIN — Medication 1 PATCH: at 08:03

## 2024-03-19 RX ADMIN — HYDROCODONE BITARTRATE AND ACETAMINOPHEN 1 TABLET: 10; 325 TABLET ORAL at 08:03

## 2024-03-19 RX ADMIN — IPRATROPIUM BROMIDE AND ALBUTEROL SULFATE 3 ML: .5; 3 SOLUTION RESPIRATORY (INHALATION) at 04:03

## 2024-03-19 RX ADMIN — DOBUTAMINE HYDROCHLORIDE 10 MCG/KG/MIN: 100 INJECTION INTRAVENOUS at 03:03

## 2024-03-19 RX ADMIN — CLONAZEPAM 1 MG: 0.5 TABLET ORAL at 08:03

## 2024-03-19 RX ADMIN — RIVAROXABAN 20 MG: 20 TABLET, FILM COATED ORAL at 05:03

## 2024-03-19 RX ADMIN — IPRATROPIUM BROMIDE AND ALBUTEROL SULFATE 3 ML: .5; 3 SOLUTION RESPIRATORY (INHALATION) at 05:03

## 2024-03-19 RX ADMIN — CLONAZEPAM 1 MG: 0.5 TABLET ORAL at 06:03

## 2024-03-19 RX ADMIN — IPRATROPIUM BROMIDE AND ALBUTEROL SULFATE 3 ML: .5; 3 SOLUTION RESPIRATORY (INHALATION) at 08:03

## 2024-03-19 RX ADMIN — ASPIRIN 81 MG: 81 TABLET, COATED ORAL at 08:03

## 2024-03-19 RX ADMIN — LISINOPRIL 10 MG: 10 TABLET ORAL at 08:03

## 2024-03-19 RX ADMIN — HUMAN ALBUMIN MICROSPHERES AND PERFLUTREN 0.66 MG: 10; .22 INJECTION, SOLUTION INTRAVENOUS at 03:03

## 2024-03-19 RX ADMIN — PREDNISONE 40 MG: 20 TABLET ORAL at 08:03

## 2024-03-19 RX ADMIN — HYDROCODONE BITARTRATE AND ACETAMINOPHEN 1 TABLET: 10; 325 TABLET ORAL at 05:03

## 2024-03-19 NOTE — NURSING
Nurses Note -- 4 Eyes      3/19/2024   4:49 PM      Skin assessed during: Admit      [x] No Altered Skin Integrity Present    []Prevention Measures Documented      [] Yes- Altered Skin Integrity Present or Discovered   [] LDA Added if Not in Epic (Describe Wound)   [] New Altered Skin Integrity was Present on Admit and Documented in LDA   [] Wound Image Taken    Wound Care Consulted? No    Attending Nurse:  Bunny Contreras RN/Staff Member:   Dina

## 2024-03-19 NOTE — ED NOTES
Spoke with echo. Pt is to go to echo and then to be transported to the floor. RNBunny, made aware. Care on going.

## 2024-03-19 NOTE — ASSESSMENT & PLAN NOTE
Stress Echo Pending  CTA and US negative for PE or DVT  D-dimer Neg  CXR negative for acute pulmonary process  Continue scheduled duonebs and refill home prescription  Follow-up with Pulm upon d/c  VTE PPX- continue home xerelto  - see COPD

## 2024-03-19 NOTE — H&P
"[facility] - Emergency Dept  [facility]  History & Physical    Patient Name: Cody Castro  MRN: [id number]  Patient Class: OP- Observation  Admission Date: 3/18/2024  Attending Physician: Agata Virk MD   Primary Care Provider: Abad Patterson MD         Patient information was obtained from patient and ER records.     Subjective:     Principal Problem:COPD exacerbation    Chief Complaint:   Chief Complaint   Patient presents with    Shortness of Breath     C/o SOB x 4 days. Reports not being able to walk long distances, endorses some bilateral arm numbness        HPI: Cody Castro is a 43-year-old with past medical history of CHF, CAD with hx MI s/p PCI, hypertension, obesity, anxiety, atrial fibrillation on Xarelto, and smoker with 25+ pack year hx presents with chief complaint of dyspnea on exertion onset 3-4 days ago. Patient endorses chronic dry cough he attributes to smoking but with newer productive cough over last week. Cough produces brown sputum. Onset of symptoms was gradual including LEVI, wheezing, fatigue.  Patient denies any chest tightness or CP, pain with inspiration, radiating pain to shoulders/jaw, diaphoresis, increased bilateral extremity edema, claudication, N/V, dizziness/lightheadedness or episodes of syncope.  Patient is not on home 02, notably provided rescue inhaler by PCP about 1 month ago but has since lost it. Otherwise endorses med compliance. Patient appears highly motivated for smoking cessation.     ED/ EDOU course: AF, Sp O2 94%, /58, Troponin trends negative x3, BNP negative, CBC CMP PT/INR d-dimer wnl. Triglycerides 193, LDL 93. Covid/ flu/ rsv neg. CXR negative. No evidence of DVT on Lower extremity US,  CTA-Chest  "Given current bolus timing and motion artifact, no definite pulmonary thromboembolism within the main pulmonary arteries, distal embolism cannot be definitively excluded.  Correlation of these findings with D-dimer and/or lower extremity " "ultrasound as warranted. 2. Possible hepatic steatosis, correlation with LFTs recommended. 3. No acute pulmonary process."   EKG  "Normal sinus rhythm  Questionable change in initial forces of Anterior leads"  Stress echo pending. Prednisone and Duonebs started in ED.       Past Medical History:   Diagnosis Date    Anxiety     Carbon monoxide poisoning     Caused a seizure    Chronic combined systolic and diastolic heart failure 6/22/2018    Chronic systolic congestive heart failure 11/15/2019    COPD exacerbation 3/19/2024    Coronary artery disease     History of heart artery stent 2013    Hypertension     Ischemic cardiomyopathy 3/16/2022    EF 35% improved to 50%    NSTEMI (non-ST elevated myocardial infarction) 2/10/2013    Obesity     YOJANA (obstructive sleep apnea)     Paroxysmal A-fib 2/9/2013       Past Surgical History:   Procedure Laterality Date    CORONARY ANGIOPLASTY      LEFT HEART CATHETERIZATION Right 6/25/2018    Procedure: Left heart cath;  Surgeon: Dieetr Vargas MD;  Location: [facility] CATH LAB;  Service: Cardiology;  Laterality: Right;       Review of patient's allergies indicates:  No Known Allergies    No current facility-administered medications on file prior to encounter.     Current Outpatient Medications on File Prior to Encounter   Medication Sig    aspirin (ECOTRIN) 81 MG EC tablet Take 1 tablet (81 mg total) by mouth once daily.    clonazePAM (KLONOPIN) 2 MG Tab Take 1 mg by mouth 2 (two) times daily as needed.    HYDROcodone-acetaminophen (NORCO)  mg per tablet Take 1 tablet by mouth every 6 (six) hours as needed.    lisinopril 10 MG tablet Take 1 tablet (10 mg total) by mouth once daily.    metoprolol succinate (TOPROL-XL) 50 MG 24 hr tablet Take 1 tablet (50 mg total) by mouth once daily.    rivaroxaban (XARELTO) 20 mg Tab Take 1 tablet (20 mg total) by mouth daily with dinner or evening meal.    rosuvastatin (CRESTOR) 40 MG Tab take 1 tablet by mouth every evening    albuterol " (PROVENTIL/VENTOLIN HFA) 90 mcg/actuation inhaler Inhale 1-2 puffs into the lungs every 6 (six) hours as needed for Wheezing. Rescue    clopidogreL (PLAVIX) 75 mg tablet Take 1 tablet (75 mg total) by mouth once daily. for 19 days    ezetimibe (ZETIA) 10 mg tablet Take 1 tablet (10 mg total) by mouth once daily.    nicotine (NICODERM CQ) 21 mg/24 hr Place 1 patch onto the skin once daily.     Family History       Problem Relation (Age of Onset)    Heart disease Mother, Father          Tobacco Use    Smoking status: Every Day     Current packs/day: 1.25     Average packs/day: 1.3 packs/day for 27.2 years (34.0 ttl pk-yrs)     Types: Cigarettes     Start date: 01/1997    Smokeless tobacco: Never   Substance and Sexual Activity    Alcohol use: Not Currently     Comment: occasional. none in 2 months    Drug use: Yes     Types: Marijuana    Sexual activity: Yes     Partners: Female     Review of Systems   Constitutional:  Positive for fatigue. Negative for chills, diaphoresis, fever and unexpected weight change.   Respiratory:  Positive for cough (Chronic dry cough now newly productive with brown sputum), shortness of breath (Shortness of breath worsened with exertion x 3-4 days) and wheezing. Negative for chest tightness.    Cardiovascular:  Negative for chest pain, palpitations and leg swelling.   Gastrointestinal:  Negative for constipation, diarrhea, nausea and vomiting.   Neurological:  Negative for dizziness.     Objective:     Vital Signs (Most Recent):  Temp: 98 °F (36.7 °C) (03/19/24 1416)  Pulse: 105 (03/19/24 1450)  Resp: 18 (03/19/24 1225)  BP: (!) 130/58 (03/19/24 1416)  SpO2: (!) 94 % (03/19/24 1416) Vital Signs (24h Range):  Temp:  [97.6 °F (36.4 °C)-98.6 °F (37 °C)] 98 °F (36.7 °C)  Pulse:  [] 105  Resp:  [16-20] 18  SpO2:  [94 %-99 %] 94 %  BP: (109-150)/(58-81) 130/58     Weight: 122.5 kg (270 lb)  Body mass index is 43.58 kg/m².     Physical Exam  Vitals and nursing note reviewed.    Constitutional:       General: He is not in acute distress.     Appearance: He is well-developed. He is obese. He is not ill-appearing or diaphoretic.   HENT:      Head: Normocephalic and atraumatic.      Mouth/Throat:      Pharynx: No oropharyngeal exudate.   Eyes:      Conjunctiva/sclera: Conjunctivae normal.      Pupils: Pupils are equal, round, and reactive to light.   Cardiovascular:      Rate and Rhythm: Normal rate and regular rhythm.      Pulses: Normal pulses.      Heart sounds: Normal heart sounds.   Pulmonary:      Effort: Pulmonary effort is normal. No respiratory distress.      Breath sounds: Decreased breath sounds present. No wheezing.      Comments: Diminished breath sounds in lower fields.   Chest:      Chest wall: No tenderness.      Comments: Barrel chested appearance  Abdominal:      General: Bowel sounds are normal. There is no distension.      Palpations: Abdomen is soft.      Tenderness: There is no abdominal tenderness. There is no guarding or rebound.      Comments: Protuberant abdomen    Musculoskeletal:         General: No tenderness. Normal range of motion.      Cervical back: Normal range of motion and neck supple.      Left lower leg: Edema (Mild edema in left lower extremity) present.   Lymphadenopathy:      Cervical: No cervical adenopathy.   Skin:     General: Skin is warm and dry.      Capillary Refill: Capillary refill takes less than 2 seconds.      Findings: No rash.   Neurological:      General: No focal deficit present.      Mental Status: He is alert and oriented to person, place, and time.      Cranial Nerves: Cranial nerves 2-12 are intact. No cranial nerve deficit.      Sensory: No sensory deficit.      Coordination: Coordination normal.   Psychiatric:         Mood and Affect: Mood normal.         Behavior: Behavior normal.         Thought Content: Thought content normal.         Judgment: Judgment normal.              CRANIAL NERVES     CN III, IV, VI   Pupils are equal,  round, and reactive to light.       Significant Labs: All pertinent labs within the past 24 hours have been reviewed.  CBC:   Recent Labs   Lab 03/18/24  0725   WBC 7.19   HGB 14.7   HCT 43.2        CMP:   Recent Labs   Lab 03/18/24  0725      K 4.3      CO2 23      BUN 19   CREATININE 1.4   CALCIUM 9.3   PROT 7.3   ALBUMIN 4.1   BILITOT 0.3   ALKPHOS 74   AST 33   ALT 37   ANIONGAP 12     Cardiac Markers:   Recent Labs   Lab 03/18/24  0725   BNP 19     Troponin:   Recent Labs   Lab 03/18/24  0725 03/18/24  1157 03/18/24  1356   TROPONINI <0.006 0.006 0.010       Significant Imaging: I have reviewed all pertinent imaging results/findings within the past 24 hours.    Imaging Results              US Lower Extremity Veins Bilateral (Final result)  Result time 03/18/24 19:29:11      Final result by Darius Chong MD (03/18/24 19:29:11)                   Impression:      No evidence of deep venous thrombosis in either lower extremity.    Electronically signed by resident: Surya Rodriguez  Date:    03/18/2024  Time:    19:21    Electronically signed by: Darius Chong MD  Date:    03/18/2024  Time:    19:29               Narrative:    EXAMINATION:  US LOWER EXTREMITY VEINS BILATERAL    CLINICAL HISTORY:  Hypoxemia    TECHNIQUE:  Duplex and color flow Doppler and dynamic compression was performed of the bilateral lower extremity veins was performed.    COMPARISON:  None    FINDINGS:  Right thigh veins: The common femoral, femoral, popliteal, upper greater saphenous, and deep femoral veins are patent and free of thrombus. The veins are normally compressible and have normal phasic flow and augmentation response.  The superficial femoral vein is duplicated proximally.    Right calf veins: The visualized calf veins are patent.    Left thigh veins: The common femoral, femoral, popliteal, upper greater saphenous, and deep femoral veins are patent and free of thrombus. The veins are normally compressible and  have normal phasic flow and augmentation response.  The superficial femoral vein is duplicated proximally.    Left calf veins: The visualized calf veins are patent.    Miscellaneous: Lymph node in the right groin measuring 3.3 x 1.9 x 1.1 cm.  It demonstrates lentiform morphology with a normal echogenic fatty hilum.                                       CTA Chest Non-Coronary (PE Studies) (Final result)  Result time 03/18/24 12:42:46      Final result by Casimiro Cade MD (03/18/24 12:42:46)                   Impression:      1. Bolus timing is insufficient for evaluation of pulmonary thromboembolism.  Given current bolus timing and motion artifact, no definite pulmonary thromboembolism within the main pulmonary arteries, distal embolism cannot be definitively excluded.  Correlation of these findings with D-dimer and/or lower extremity ultrasound as warranted.  2. Possible hepatic steatosis, correlation with LFTs recommended.  3. No acute pulmonary process.  4. Please see above for additional findings.      Electronically signed by: Casimiro Cade MD  Date:    03/18/2024  Time:    12:42               Narrative:    EXAMINATION:  CTA CHEST NON CORONARY (PE STUDIES)    CLINICAL HISTORY:  Pulmonary embolism (PE) suspected, neg D-dimer;    TECHNIQUE:  Low dose axial images, sagittal and coronal reformations were obtained from the thoracic inlet to the lung bases following the IV administration of 100 mL of Omnipaque 350.  Contrast timing was optimized to evaluate the pulmonary arteries.  MIP images were performed.    COMPARISON:  Radiograph 03/18/2024, CTA chest 02/09/2013    FINDINGS:  The structures at the base of the neck are unremarkable.  No significant mediastinal lymphadenopathy.  The heart is not enlarged.  No pericardial effusion.  The thoracic aorta tapers normally.  There is calcification in the distribution of the coronary arteries.  The visualized portions of the left kidney, adrenal glands, spleen,  "pancreas and gallbladder are grossly unremarkable.  The liver is hypoattenuating suggesting possible steatosis versus contrast phase, correlation with LFTs as warranted.  There is a minimal hiatal hernia.    Motion artifact limits evaluation of the airways and pulmonary parenchyma.  Allowing for this, the airways are patent.  There is bilateral basilar dependent atelectasis.  No large focal consolidation.  No pneumothorax.  No pleural effusion.    Bolus timing is insufficient for evaluation of pulmonary thromboembolism noting weak opacification of the pulmonary arteries.  No large central pulmonary thromboembolism.  No definite pulmonary arterial filling defect to the proximal lobar branches however distal embolism cannot be excluded on the basis of this exam.  Examination is further limited secondary to motion artifact.  Correlation of these findings with D-dimer and/or lower extremity ultrasound as warranted.    There are degenerative changes of the spine.  No significant axillary lymphadenopathy.                                       X-Ray Chest PA And Lateral (Final result)  Result time 03/18/24 08:13:13      Final result by Dae Isbell MD (03/18/24 08:13:13)                   Impression:      No acute cardiopulmonary finding.      Electronically signed by: Dae Isbell MD  Date:    03/18/2024  Time:    08:13               Narrative:    EXAMINATION:  XR CHEST PA AND LATERAL    CLINICAL HISTORY:  Provided history is "Chest Pain;  ".    TECHNIQUE:  Frontal and lateral views of the chest were performed.    COMPARISON:  07/13/2023.    FINDINGS:  Cardiac wires overlie the chest.  Cardiomediastinal silhouette is not enlarged.  No focal consolidation.  No sizable pleural effusion.  No pneumothorax.                                      Assessment/Plan:     * COPD exacerbation  Patient's COPD is with exacerbation noted by worsening of baseline hypoxia currently.  Patient is currently on COPD Pathway. Continue " scheduled inhalers   , Steroids, Antibiotics, and Supplemental oxygen and monitor respiratory status closely.     - AFVSS, non-septic   - CXR without acute process   -Prednisone in EDOU; will trial IV methylprednisolone as pt O2 sats remain low  -Start Azithromycin x5 d  -Continue Duoneb treatment  -Pt de-sat to 91% spO2 on room air during walk test, stable at rest   -Monitor O2 closely  -Patient does not use home O2  -Supplemental O2 as Needed   - will need to refill albuterol, consider starting tiotropium at dc   - pulm referral at [place]   - smoking cessation     Dyspnea on exertion    Stress Echo Pending  CTA and US negative for PE or DVT  D-dimer Neg  CXR negative for acute pulmonary process  Continue scheduled duonebs and refill home prescription  Follow-up with Pulm upon d/c  VTE PPX- continue home xerelto  - see COPD    Morbid obesity  Body mass index is 43.58 kg/m². Morbid obesity complicates all aspects of disease management from diagnostic modalities to treatment. Weight loss encouraged and health benefits explained to patient.         YOJANA (obstructive sleep apnea)  - discuss importance of CPAP      Coronary artery disease involving native coronary artery of native heart without angina pectoris  Patient with known CAD s/p stent placement, which is controlled Will continue ASA and Statin and monitor for S/Sx of angina/ACS. Continue to monitor on telemetry.   - in light of LEVI and presence of the similar NSTEMI presentation, will obtain stress echo   - trops neg x 3   - BNP neg   - lipid panel reviewed, continue statin    Paroxysmal A-fib  Patient with Paroxysmal (<7 days) atrial fibrillation which is controlled currently with Beta Blocker. Patient is currently in sinus rhythm.[id number] Score: 1.  Anticoagulation indicated. Anticoagulation done with xarelto .    Cigarette smoker    Nicoderm CQ patch Daily    Patient highly motivated for smoking cessation; suggest cessation counseling referral     Educated on  risk factors associated with COPD    Mixed hyperlipidemia      LDL 93    Continue home rosuvastatin    HTN (hypertension)  Chronic, controlled. Latest blood pressure and vitals reviewed-     Temp:  [97.6 °F (36.4 °C)-98.6 °F (37 °C)]   Pulse:  []   Resp:  [16-20]   BP: (109-150)/(58-81)   SpO2:  [92 %-99 %] .   Home meds for hypertension were reviewed and noted below.   Hypertension Medications               lisinopril 10 MG tablet Take 1 tablet (10 mg total) by mouth once daily.    metoprolol succinate (TOPROL-XL) 50 MG 24 hr tablet Take 1 tablet (50 mg total) by mouth once daily.            While in the hospital, will manage blood pressure as follows; Continue home antihypertensive regimen    Will utilize p.r.n. blood pressure medication only if patient's blood pressure greater than 180/110 and he develops symptoms such as worsening chest pain or shortness of breath.    Continue home Lisinopril, Metoprolol, ASA 81 mg   - hold MTP prior to stress test, may resume after     Cardiac Diet      VTE Risk Mitigation (From admission, onward)           Ordered     rivaroxaban tablet 20 mg  With dinner         03/18/24 1456     IP VTE HIGH RISK PATIENT  Once         03/18/24 1301     Place sequential compression device  Until discontinued         03/18/24 1301                         On 03/19/2024, patient should be placed in hospital observation services under my care in collaboration with Dr. Agata Virk.    Frieda Kumar PA-C   Department of Hospital Medicine [facility] [place]

## 2024-03-19 NOTE — ASSESSMENT & PLAN NOTE
Body mass index is 43.58 kg/m². Morbid obesity complicates all aspects of disease management from diagnostic modalities to treatment. Weight loss encouraged and health benefits explained to patient.

## 2024-03-19 NOTE — ASSESSMENT & PLAN NOTE
Patient with known CAD s/p stent placement, which is controlled Will continue ASA and Statin and monitor for S/Sx of angina/ACS. Continue to monitor on telemetry.   - in light of LEVI and presence of the similar NSTEMI presentation, will obtain stress echo   - trops neg x 3   - BNP neg   - lipid panel reviewed, continue statin

## 2024-03-19 NOTE — HPI
"Cody Castro is a 43-year-old with past medical history of CHF, CAD with hx MI s/p PCI, hypertension, obesity, anxiety, atrial fibrillation on Xarelto, and smoker with 25+ pack year hx presents with chief complaint of dyspnea on exertion onset 3-4 days ago. Patient endorses chronic dry cough he attributes to smoking but with newer productive cough over last week. Cough produces brown sputum. Onset of symptoms was gradual including LEVI, wheezing, fatigue.  Patient denies any chest tightness or CP, pain with inspiration, radiating pain to shoulders/jaw, diaphoresis, increased bilateral extremity edema, claudication, N/V, dizziness/lightheadedness or episodes of syncope.  Patient is not on home 02, notably provided rescue inhaler by PCP about 1 month ago but has since lost it. Otherwise endorses med compliance. Patient appears highly motivated for smoking cessation.     ED/ EDOU course: AF, Sp O2 94%, /58, Troponin trends negative x3, BNP negative, CBC CMP PT/INR d-dimer wnl. Triglycerides 193, LDL 93. Covid/ flu/ rsv neg. CXR negative. No evidence of DVT on Lower extremity US,  CTA-Chest  "Given current bolus timing and motion artifact, no definite pulmonary thromboembolism within the main pulmonary arteries, distal embolism cannot be definitively excluded.  Correlation of these findings with D-dimer and/or lower extremity ultrasound as warranted. 2. Possible hepatic steatosis, correlation with LFTs recommended. 3. No acute pulmonary process."   EKG  "Normal sinus rhythm  Questionable change in initial forces of Anterior leads"  Stress echo pending. Prednisone and Duonebs started in ED.     "

## 2024-03-19 NOTE — PROGRESS NOTES
ED Observation Unit  Progress Note      HPI   43-year-old with past medical history of CHF, CAD with coronary stents in place, hypertension, obesity, anxiety, atrial fibrillation on Xarelto, and smoker presents with chief complaint of dyspnea on exertion onset 3-4 days ago. Feels similar to when he had a heart attack in the past. Denies any shortness of breath at rest or currently. Denies chest pain, leg swelling, URI symptoms, nausea, vomiting, abdominal pain. He also admits to bilateral arm numbness. Reports waking this morning with numbness and tingling from bilateral elbow to hands. Resolved after a few minutes. Has happened in the past. Denies any extremity weakness, dysphagia, dysarthria, facial drooping, or any other complaints at this time. Reports he is compliant with his medications. He does consistently smoke a pack per day. He is not on any home oxygen. Of note, the patient states that his wife frequently wakes him up in the middle of the night due to his snoring/apnea. He inquires about a sleep study.      Interval History   Patient reports improvement in symptoms after breathing treatments, steroids.  O2 sats 94% on room air. No acute distress noted. No acute events overnight.     PMHx   Past Medical History:   Diagnosis Date    Anxiety     Carbon monoxide poisoning     Caused a seizure    Chronic combined systolic and diastolic heart failure 6/22/2018    Chronic systolic congestive heart failure 11/15/2019    Coronary artery disease     History of heart artery stent 2013    Hypertension     Ischemic cardiomyopathy 3/16/2022    EF 35% improved to 50%    NSTEMI (non-ST elevated myocardial infarction) 2/10/2013    Obesity     YOJANA (obstructive sleep apnea)     Paroxysmal A-fib 2/9/2013      Past Surgical History:   Procedure Laterality Date    CORONARY ANGIOPLASTY      LEFT HEART CATHETERIZATION Right 6/25/2018    Procedure: Left heart cath;  Surgeon: Dieter Vargas MD;  Location: [facility] CATH LAB;   Service: Cardiology;  Laterality: Right;        Family Hx   Family History   Problem Relation Age of Onset    Heart disease Mother     Heart disease Father         Social Hx   Social History     Socioeconomic History    Marital status:    Tobacco Use    Smoking status: Every Day     Current packs/day: 1.25     Average packs/day: 1.3 packs/day for 27.2 years (34.0 ttl pk-yrs)     Types: Cigarettes     Start date: 01/1997    Smokeless tobacco: Never   Substance and Sexual Activity    Alcohol use: Not Currently     Comment: occasional. none in 2 months    Drug use: Yes     Types: Marijuana    Sexual activity: Yes     Partners: Female     Social Determinants of Health     Financial Resource Strain: Low Risk  (3/18/2024)    Overall Financial Resource Strain (CARDIA)     Difficulty of Paying Living Expenses: Not hard at all   Recent Concern: Financial Resource Strain - High Risk (3/18/2024)    Overall Financial Resource Strain (CARDIA)     Difficulty of Paying Living Expenses: Hard   Food Insecurity: No Food Insecurity (3/18/2024)    Hunger Vital Sign     Worried About Running Out of Food in the Last Year: Never true     Ran Out of Food in the Last Year: Never true   Recent Concern: Food Insecurity - Food Insecurity Present (3/18/2024)    Hunger Vital Sign     Worried About Running Out of Food in the Last Year: Sometimes true     Ran Out of Food in the Last Year: Often true   Transportation Needs: No Transportation Needs (3/18/2024)    PRAPARE - Transportation     Lack of Transportation (Medical): No     Lack of Transportation (Non-Medical): No   Physical Activity: Inactive (3/18/2024)    Exercise Vital Sign     Days of Exercise per Week: 0 days     Minutes of Exercise per Session: 0 min   Stress: No Stress Concern Present (3/18/2024)    Citizen of [place] of Occupational Health - Occupational Stress Questionnaire     Feeling of Stress : Not at all   Social Connections: Moderately Integrated (3/18/2024)    Social  Connection and Isolation Panel [NHANES]     Frequency of Communication with Friends and Family: Three times a week     Frequency of Social Gatherings with Friends and Family: More than three times a week     Attends Voodoo Services: More than 4 times per year     Active Member of Clubs or Organizations: No     Attends Club or Organization Meetings: Never     Marital Status: Living with partner   Housing Stability: Low Risk  (3/18/2024)    Housing Stability Vital Sign     Unable to Pay for Housing in the Last Year: No     Number of Places Lived in the Last Year: 1     Unstable Housing in the Last Year: No        Vital Signs   Vitals:    03/19/24 0523 03/19/24 0721 03/19/24 0823 03/19/24 0832   BP:   135/75    BP Location:       Patient Position:       Pulse: 72 89 96    Resp: 18 18 20 20   Temp:   97.9 °F (36.6 °C)    TempSrc:   Oral    SpO2: 99% 99% (!) 94%    Weight:       Height:            Review of Systems  Review of Systems   Respiratory:  Positive for cough and shortness of breath.        Brief Physical Exam/Reassessment   Physical Exam  Vitals reviewed.   Constitutional:       General: He is not in acute distress.     Appearance: He is obese. He is not diaphoretic.   HENT:      Head: Normocephalic and atraumatic.   Cardiovascular:      Rate and Rhythm: Normal rate and regular rhythm.      Heart sounds: Heart sounds not distant. No murmur heard.     No friction rub. No gallop.   Pulmonary:      Effort: Pulmonary effort is normal. No respiratory distress.      Breath sounds: Examination of the right-lower field reveals wheezing. Wheezing (faint) present. No decreased breath sounds, rhonchi or rales.   Chest:      Chest wall: No tenderness.   Musculoskeletal:      Cervical back: Neck supple.   Skin:     General: Skin is warm and dry.   Neurological:      Mental Status: He is alert.   Psychiatric:         Mood and Affect: Mood and affect normal.         Labs/Imaging   Labs Reviewed   CBC W/ AUTO DIFFERENTIAL -  Abnormal; Notable for the following components:       Result Value    MCH 32.0 (*)     Immature Granulocytes 0.6 (*)     All other components within normal limits    Narrative:     Release to patient->Immediate   LIPID PANEL - Abnormal; Notable for the following components:    Triglycerides 193 (*)     HDL 32 (*)     HDL/Cholesterol Ratio 19.5 (*)     Total Cholesterol/HDL Ratio 5.1 (*)     All other components within normal limits   HIV 1 / 2 ANTIBODY    Narrative:     Release to patient->Immediate   HEPATITIS C ANTIBODY    Narrative:     Release to patient->Immediate   COMPREHENSIVE METABOLIC PANEL    Narrative:     Release to patient->Immediate   B-TYPE NATRIURETIC PEPTIDE    Narrative:     Release to patient->Immediate   TROPONIN I    Narrative:     Release to patient->Immediate   D DIMER, QUANTITATIVE   TROPONIN I   SARS-COV2 (COVID) WITH FLU/RSV BY PCR   TROPONIN I      Imaging Results              US Lower Extremity Veins Bilateral (Final result)  Result time 03/18/24 19:29:11      Final result by Darius Chong MD (03/18/24 19:29:11)                   Impression:      No evidence of deep venous thrombosis in either lower extremity.    Electronically signed by resident: Surya Rodriguez  Date:    03/18/2024  Time:    19:21    Electronically signed by: Darius Chong MD  Date:    03/18/2024  Time:    19:29               Narrative:    EXAMINATION:  US LOWER EXTREMITY VEINS BILATERAL    CLINICAL HISTORY:  Hypoxemia    TECHNIQUE:  Duplex and color flow Doppler and dynamic compression was performed of the bilateral lower extremity veins was performed.    COMPARISON:  None    FINDINGS:  Right thigh veins: The common femoral, femoral, popliteal, upper greater saphenous, and deep femoral veins are patent and free of thrombus. The veins are normally compressible and have normal phasic flow and augmentation response.  The superficial femoral vein is duplicated proximally.    Right calf veins: The visualized calf veins are  patent.    Left thigh veins: The common femoral, femoral, popliteal, upper greater saphenous, and deep femoral veins are patent and free of thrombus. The veins are normally compressible and have normal phasic flow and augmentation response.  The superficial femoral vein is duplicated proximally.    Left calf veins: The visualized calf veins are patent.    Miscellaneous: Lymph node in the right groin measuring 3.3 x 1.9 x 1.1 cm.  It demonstrates lentiform morphology with a normal echogenic fatty hilum.                                       CTA Chest Non-Coronary (PE Studies) (Final result)  Result time 03/18/24 12:42:46      Final result by Casimiro Cade MD (03/18/24 12:42:46)                   Impression:      1. Bolus timing is insufficient for evaluation of pulmonary thromboembolism.  Given current bolus timing and motion artifact, no definite pulmonary thromboembolism within the main pulmonary arteries, distal embolism cannot be definitively excluded.  Correlation of these findings with D-dimer and/or lower extremity ultrasound as warranted.  2. Possible hepatic steatosis, correlation with LFTs recommended.  3. No acute pulmonary process.  4. Please see above for additional findings.      Electronically signed by: Casimiro Cade MD  Date:    03/18/2024  Time:    12:42               Narrative:    EXAMINATION:  CTA CHEST NON CORONARY (PE STUDIES)    CLINICAL HISTORY:  Pulmonary embolism (PE) suspected, neg D-dimer;    TECHNIQUE:  Low dose axial images, sagittal and coronal reformations were obtained from the thoracic inlet to the lung bases following the IV administration of 100 mL of Omnipaque 350.  Contrast timing was optimized to evaluate the pulmonary arteries.  MIP images were performed.    COMPARISON:  Radiograph 03/18/2024, CTA chest 02/09/2013    FINDINGS:  The structures at the base of the neck are unremarkable.  No significant mediastinal lymphadenopathy.  The heart is not enlarged.  No  "pericardial effusion.  The thoracic aorta tapers normally.  There is calcification in the distribution of the coronary arteries.  The visualized portions of the left kidney, adrenal glands, spleen, pancreas and gallbladder are grossly unremarkable.  The liver is hypoattenuating suggesting possible steatosis versus contrast phase, correlation with LFTs as warranted.  There is a minimal hiatal hernia.    Motion artifact limits evaluation of the airways and pulmonary parenchyma.  Allowing for this, the airways are patent.  There is bilateral basilar dependent atelectasis.  No large focal consolidation.  No pneumothorax.  No pleural effusion.    Bolus timing is insufficient for evaluation of pulmonary thromboembolism noting weak opacification of the pulmonary arteries.  No large central pulmonary thromboembolism.  No definite pulmonary arterial filling defect to the proximal lobar branches however distal embolism cannot be excluded on the basis of this exam.  Examination is further limited secondary to motion artifact.  Correlation of these findings with D-dimer and/or lower extremity ultrasound as warranted.    There are degenerative changes of the spine.  No significant axillary lymphadenopathy.                                       X-Ray Chest PA And Lateral (Final result)  Result time 03/18/24 08:13:13      Final result by Dae Isbell MD (03/18/24 08:13:13)                   Impression:      No acute cardiopulmonary finding.      Electronically signed by: Dae Isbell MD  Date:    03/18/2024  Time:    08:13               Narrative:    EXAMINATION:  XR CHEST PA AND LATERAL    CLINICAL HISTORY:  Provided history is "Chest Pain;  ".    TECHNIQUE:  Frontal and lateral views of the chest were performed.    COMPARISON:  07/13/2023.    FINDINGS:  Cardiac wires overlie the chest.  Cardiomediastinal silhouette is not enlarged.  No focal consolidation.  No sizable pleural effusion.  No pneumothorax.                 "                       I reviewed all labs, imaging, EKGs.     Plan   Dyspnea on Exertion  -Oxygen saturation of about 93% with exertion, 95% at rest on initial evaluation  -patient appears comfortable without increased work of breathing    -etiology unclear.   -CTA PE study insufficient for evaluation for PE due to bolus timing and motion artifact. No definite pulmonary thromboembolism within the main pulmonary arteries   Negative D-dimer in the ED. He is compliant with his Xarelto.    - DVT study negative  - Serial troponins negative  -stress echocardiogram scheduled for today 1430  -scheduled duo nebs and prednisone as the patient likely had underlying COPD with smoking history.   -reports snoring, periods of apnea at night. Will refer for sleep study upon discharge.   -would likely benefit from pulmonology consult upon discharge as well.      I have discussed this case with EDU [place].

## 2024-03-19 NOTE — HOSPITAL COURSE
44 y/o with Pmh CHF, CAD prior stents,HTN, obesity, afib on xerelto with extensive smoking history. Presented with dyspnea on exertion and newly productive cough with brown sputum worsened from chronic dry smoking cough. O2 Sat at 93% on RA at rest, desat to 91% with ambulation in EDOU, prompting admission to . CXR negative for acute pulmonary process. Stress echo negative for reversible ischemia. Trop, BNP, Ddimer negative. CTA/US neg for DVT or PE. Adding azithromycin for empiric coverage of acute COPD exacerbation, prednisone. Dyspnea on exertion and O2 saturation much improved. Will d/c today with PCP and Pulm outpatient follow-up. Plan to finish abx and prednisone course outpatient. Refill albuterol, order tiotropium. Nicotine patches and referral to smoking cessation placed. On exam prior to discharge patient doing well and medically stable. Discharge plan discussed and patient expressed understanding. All questions answered.

## 2024-03-19 NOTE — PLAN OF CARE
Patient continued to have hypoxia to 91% on room air with ambulation.  He will require additional breathing treatments and steroids for suspected new onset vs undiagnosed COPD.  He was upgraded to Hospital Medicine Service as he exceeded ED observation period.  He is agreeable to stay.

## 2024-03-19 NOTE — ASSESSMENT & PLAN NOTE
Nicoderm CQ patch Daily    Patient highly motivated for smoking cessation; suggest cessation counseling referral     Educated on risk factors associated with COPD

## 2024-03-19 NOTE — ASSESSMENT & PLAN NOTE
Patient's COPD is with exacerbation noted by worsening of baseline hypoxia currently.  Patient is currently on COPD Pathway. Continue scheduled inhalers   , Steroids, Antibiotics, and Supplemental oxygen and monitor respiratory status closely.     - AFVSS, non-septic   - CXR without acute process   -Prednisone in EDOU; will trial IV methylprednisolone as pt O2 sats remain low  -Start Azithromycin x5 d  -Continue Duoneb treatment  -Pt de-sat to 91% spO2 on room air during walk test, stable at rest   -Monitor O2 closely  -Patient does not use home O2  -Supplemental O2 as Needed   - will need to refill albuterol, consider starting tiotropium at dc   - pulm referral at dc   - smoking cessation

## 2024-03-19 NOTE — ED NOTES
Pt states he is going to [employer], did not want to wait for breakfast.  Pt is NPO after 10am per orders.

## 2024-03-19 NOTE — ASSESSMENT & PLAN NOTE
Patient with Paroxysmal (<7 days) atrial fibrillation which is controlled currently with Beta Blocker. Patient is currently in sinus rhythm.[id number] Score: 1.  Anticoagulation indicated. Anticoagulation done with xarelto .

## 2024-03-19 NOTE — PLAN OF CARE
[facility] - Emergency Dept  Initial Discharge Assessment       Primary Care Provider: Abad Patterson MD    Admission Diagnosis: SOB (shortness of breath) [R06.02]    Admission Date: 3/18/2024  Expected Discharge Date: 03/19/2024,    Sw met pt at bedside. Pt ambulates without assistance , Prt has no acute case management needs.     Transition of Care Barriers: (P) None    Payor: [place] [employer] / Plan: [facility] / Product Type: Commercial /     Extended Emergency Contact Information  Primary Emergency Contact: Karli Tolentino  Address: [address] [place] [address] [place]  Mobile Phone: [phone]  Relation: Significant other    Discharge Plan A: (P) Home with family  Discharge Plan B: (P) Home      [id number] - [place], [place] - [address] AT [facility] [place] [address]  [address]  [facility] [address]  Phone: [phone] Fax: [phone]      Initial Assessment (most recent)       Adult Discharge Assessment - 03/18/24 2209          Discharge Assessment    Assessment Type Discharge Planning Assessment (P)      Confirmed/corrected address, phone number and insurance Yes (P)      Confirmed Demographics Correct on Facesheet (P)      Source of Information patient (P)      Does patient/caregiver understand observation status Yes (P)      Communicated BROCK with patient/caregiver Date not available/Unable to determine (P)      People in Home significant other (P)      Do you expect to return to your current living situation? Yes (P)      Do you have help at home or someone to help you manage your care at home? Yes (P)      Prior to hospitilization cognitive status: Alert/Oriented (P)      Current cognitive status: Alert/Oriented (P)      Walking or Climbing Stairs Difficulty no (P)      Dressing/Bathing Difficulty no (P)      Home Accessibility wheelchair accessible (P)      Equipment Currently Used at Home none (P)       Readmission within 30 days? No (P)      Patient currently being followed by outpatient case management? No (P)      Do you currently have service(s) that help you manage your care at home? No (P)      Do you take prescription medications? Yes (P)      Do you have prescription coverage? Yes (P)      Do you have any problems affording any of your prescribed medications? No (P)      Is the patient taking medications as prescribed? yes (P)      How do you get to doctors appointments? car, drives self (P)      Are you on dialysis? No (P)      Do you take coumadin? No (P)      Discharge Plan A Home with family (P)      Discharge Plan B Home (P)      Discharge Plan discussed with: Patient (P)      Transition of Care Barriers None (P)         Physical Activity    On average, how many days per week do you engage in moderate to strenuous exercise (like a brisk walk)? 0 days (P)      On average, how many minutes do you engage in exercise at this level? 0 min (P)         Financial Resource Strain    How hard is it for you to pay for the very basics like food, housing, medical care, and heating? Not hard at all (P)         Housing Stability    In the last 12 months, was there a time when you were not able to pay the mortgage or rent on time? No (P)      In the last 12 months, how many places have you lived? 1 (P)      In the last 12 months, was there a time when you did not have a steady place to sleep or slept in a shelter (including now)? No (P)         Transportation Needs    In the past 12 months, has lack of transportation kept you from medical appointments or from getting medications? No (P)      In the past 12 months, has lack of transportation kept you from meetings, work, or from getting things needed for daily living? No (P)         Food Insecurity    Within the past 12 months, you worried that your food would run out before you got the money to buy more. Never true (P)      Within the past 12 months, the food you  bought just didn't last and you didn't have money to get more. Never true (P)         Stress    Do you feel stress - tense, restless, nervous, or anxious, or unable to sleep at night because your mind is troubled all the time - these days? Not at all (P)         Social Connections    In a typical week, how many times do you talk on the phone with family, friends, or neighbors? Three times a week (P)      How often do you get together with friends or relatives? More than three times a week (P)      How often do you attend Zoroastrian or Confucianism services? More than 4 times per year (P)      Do you belong to any clubs or organizations such as Zoroastrian groups, unions, fraternal or athletic groups, or school groups? No (P)      How often do you attend meetings of the clubs or organizations you belong to? Never (P)      Are you , , , , never , or living with a partner? Living with partner (P)         Alcohol Use    Q1: How often do you have a drink containing alcohol? Monthly or less (P)      Q2: How many drinks containing alcohol do you have on a typical day when you are drinking? -- (P)    2 drinks a month.                       Kiki Shultz LMSW  Case Management  Emergency Department  [phone]

## 2024-03-19 NOTE — SUBJECTIVE & OBJECTIVE
Past Medical History:   Diagnosis Date    Anxiety     Carbon monoxide poisoning     Caused a seizure    Chronic combined systolic and diastolic heart failure 6/22/2018    Chronic systolic congestive heart failure 11/15/2019    COPD exacerbation 3/19/2024    Coronary artery disease     History of heart artery stent 2013    Hypertension     Ischemic cardiomyopathy 3/16/2022    EF 35% improved to 50%    NSTEMI (non-ST elevated myocardial infarction) 2/10/2013    Obesity     YOJANA (obstructive sleep apnea)     Paroxysmal A-fib 2/9/2013       Past Surgical History:   Procedure Laterality Date    CORONARY ANGIOPLASTY      LEFT HEART CATHETERIZATION Right 6/25/2018    Procedure: Left heart cath;  Surgeon: Dieter Vargas MD;  Location: [facility] CATH LAB;  Service: Cardiology;  Laterality: Right;       Review of patient's allergies indicates:  No Known Allergies    No current facility-administered medications on file prior to encounter.     Current Outpatient Medications on File Prior to Encounter   Medication Sig    aspirin (ECOTRIN) 81 MG EC tablet Take 1 tablet (81 mg total) by mouth once daily.    clonazePAM (KLONOPIN) 2 MG Tab Take 1 mg by mouth 2 (two) times daily as needed.    HYDROcodone-acetaminophen (NORCO)  mg per tablet Take 1 tablet by mouth every 6 (six) hours as needed.    lisinopril 10 MG tablet Take 1 tablet (10 mg total) by mouth once daily.    metoprolol succinate (TOPROL-XL) 50 MG 24 hr tablet Take 1 tablet (50 mg total) by mouth once daily.    rivaroxaban (XARELTO) 20 mg Tab Take 1 tablet (20 mg total) by mouth daily with dinner or evening meal.    rosuvastatin (CRESTOR) 40 MG Tab take 1 tablet by mouth every evening    albuterol (PROVENTIL/VENTOLIN HFA) 90 mcg/actuation inhaler Inhale 1-2 puffs into the lungs every 6 (six) hours as needed for Wheezing. Rescue    clopidogreL (PLAVIX) 75 mg tablet Take 1 tablet (75 mg total) by mouth once daily. for 19 days    ezetimibe (ZETIA) 10 mg tablet Take 1  tablet (10 mg total) by mouth once daily.    nicotine (NICODERM CQ) 21 mg/24 hr Place 1 patch onto the skin once daily.     Family History       Problem Relation (Age of Onset)    Heart disease Mother, Father          Tobacco Use    Smoking status: Every Day     Current packs/day: 1.25     Average packs/day: 1.3 packs/day for 27.2 years (34.0 ttl pk-yrs)     Types: Cigarettes     Start date: 01/1997    Smokeless tobacco: Never   Substance and Sexual Activity    Alcohol use: Not Currently     Comment: occasional. none in 2 months    Drug use: Yes     Types: Marijuana    Sexual activity: Yes     Partners: Female     Review of Systems   Constitutional:  Positive for fatigue. Negative for chills, diaphoresis, fever and unexpected weight change.   Respiratory:  Positive for cough (Chronic dry cough now newly productive with brown sputum), shortness of breath (Shortness of breath worsened with exertion x 3-4 days) and wheezing. Negative for chest tightness.    Cardiovascular:  Negative for chest pain, palpitations and leg swelling.   Gastrointestinal:  Negative for constipation, diarrhea, nausea and vomiting.   Neurological:  Negative for dizziness.     Objective:     Vital Signs (Most Recent):  Temp: 98 °F (36.7 °C) (03/19/24 1416)  Pulse: 105 (03/19/24 1450)  Resp: 18 (03/19/24 1225)  BP: (!) 130/58 (03/19/24 1416)  SpO2: (!) 94 % (03/19/24 1416) Vital Signs (24h Range):  Temp:  [97.6 °F (36.4 °C)-98.6 °F (37 °C)] 98 °F (36.7 °C)  Pulse:  [] 105  Resp:  [16-20] 18  SpO2:  [94 %-99 %] 94 %  BP: (109-150)/(58-81) 130/58     Weight: 122.5 kg (270 lb)  Body mass index is 43.58 kg/m².     Physical Exam  Vitals and nursing note reviewed.   Constitutional:       General: He is not in acute distress.     Appearance: He is well-developed. He is obese. He is not ill-appearing or diaphoretic.   HENT:      Head: Normocephalic and atraumatic.      Mouth/Throat:      Pharynx: No oropharyngeal exudate.   Eyes:       Conjunctiva/sclera: Conjunctivae normal.      Pupils: Pupils are equal, round, and reactive to light.   Cardiovascular:      Rate and Rhythm: Normal rate and regular rhythm.      Pulses: Normal pulses.      Heart sounds: Normal heart sounds.   Pulmonary:      Effort: Pulmonary effort is normal. No respiratory distress.      Breath sounds: Decreased breath sounds present. No wheezing.      Comments: Diminished breath sounds in lower fields.   Chest:      Chest wall: No tenderness.      Comments: Barrel chested appearance  Abdominal:      General: Bowel sounds are normal. There is no distension.      Palpations: Abdomen is soft.      Tenderness: There is no abdominal tenderness. There is no guarding or rebound.      Comments: Protuberant abdomen    Musculoskeletal:         General: No tenderness. Normal range of motion.      Cervical back: Normal range of motion and neck supple.      Left lower leg: Edema (Mild edema in left lower extremity) present.   Lymphadenopathy:      Cervical: No cervical adenopathy.   Skin:     General: Skin is warm and dry.      Capillary Refill: Capillary refill takes less than 2 seconds.      Findings: No rash.   Neurological:      General: No focal deficit present.      Mental Status: He is alert and oriented to person, place, and time.      Cranial Nerves: Cranial nerves 2-12 are intact. No cranial nerve deficit.      Sensory: No sensory deficit.      Coordination: Coordination normal.   Psychiatric:         Mood and Affect: Mood normal.         Behavior: Behavior normal.         Thought Content: Thought content normal.         Judgment: Judgment normal.              CRANIAL NERVES     CN III, IV, VI   Pupils are equal, round, and reactive to light.       Significant Labs: All pertinent labs within the past 24 hours have been reviewed.  CBC:   Recent Labs   Lab 03/18/24  0725   WBC 7.19   HGB 14.7   HCT 43.2        CMP:   Recent Labs   Lab 03/18/24  0725      K 4.3       CO2 23      BUN 19   CREATININE 1.4   CALCIUM 9.3   PROT 7.3   ALBUMIN 4.1   BILITOT 0.3   ALKPHOS 74   AST 33   ALT 37   ANIONGAP 12     Cardiac Markers:   Recent Labs   Lab 03/18/24  0725   BNP 19     Troponin:   Recent Labs   Lab 03/18/24  0725 03/18/24  1157 03/18/24  1356   TROPONINI <0.006 0.006 0.010       Significant Imaging: I have reviewed all pertinent imaging results/findings within the past 24 hours.    Imaging Results              US Lower Extremity Veins Bilateral (Final result)  Result time 03/18/24 19:29:11      Final result by Darius Chong MD (03/18/24 19:29:11)                   Impression:      No evidence of deep venous thrombosis in either lower extremity.    Electronically signed by resident: Surya Rodriguez  Date:    03/18/2024  Time:    19:21    Electronically signed by: Darius Chong MD  Date:    03/18/2024  Time:    19:29               Narrative:    EXAMINATION:  US LOWER EXTREMITY VEINS BILATERAL    CLINICAL HISTORY:  Hypoxemia    TECHNIQUE:  Duplex and color flow Doppler and dynamic compression was performed of the bilateral lower extremity veins was performed.    COMPARISON:  None    FINDINGS:  Right thigh veins: The common femoral, femoral, popliteal, upper greater saphenous, and deep femoral veins are patent and free of thrombus. The veins are normally compressible and have normal phasic flow and augmentation response.  The superficial femoral vein is duplicated proximally.    Right calf veins: The visualized calf veins are patent.    Left thigh veins: The common femoral, femoral, popliteal, upper greater saphenous, and deep femoral veins are patent and free of thrombus. The veins are normally compressible and have normal phasic flow and augmentation response.  The superficial femoral vein is duplicated proximally.    Left calf veins: The visualized calf veins are patent.    Miscellaneous: Lymph node in the right groin measuring 3.3 x 1.9 x 1.1 cm.  It demonstrates lentiform  morphology with a normal echogenic fatty hilum.                                       CTA Chest Non-Coronary (PE Studies) (Final result)  Result time 03/18/24 12:42:46      Final result by Casimiro Cade MD (03/18/24 12:42:46)                   Impression:      1. Bolus timing is insufficient for evaluation of pulmonary thromboembolism.  Given current bolus timing and motion artifact, no definite pulmonary thromboembolism within the main pulmonary arteries, distal embolism cannot be definitively excluded.  Correlation of these findings with D-dimer and/or lower extremity ultrasound as warranted.  2. Possible hepatic steatosis, correlation with LFTs recommended.  3. No acute pulmonary process.  4. Please see above for additional findings.      Electronically signed by: Casimiro Cade MD  Date:    03/18/2024  Time:    12:42               Narrative:    EXAMINATION:  CTA CHEST NON CORONARY (PE STUDIES)    CLINICAL HISTORY:  Pulmonary embolism (PE) suspected, neg D-dimer;    TECHNIQUE:  Low dose axial images, sagittal and coronal reformations were obtained from the thoracic inlet to the lung bases following the IV administration of 100 mL of Omnipaque 350.  Contrast timing was optimized to evaluate the pulmonary arteries.  MIP images were performed.    COMPARISON:  Radiograph 03/18/2024, CTA chest 02/09/2013    FINDINGS:  The structures at the base of the neck are unremarkable.  No significant mediastinal lymphadenopathy.  The heart is not enlarged.  No pericardial effusion.  The thoracic aorta tapers normally.  There is calcification in the distribution of the coronary arteries.  The visualized portions of the left kidney, adrenal glands, spleen, pancreas and gallbladder are grossly unremarkable.  The liver is hypoattenuating suggesting possible steatosis versus contrast phase, correlation with LFTs as warranted.  There is a minimal hiatal hernia.    Motion artifact limits evaluation of the airways and pulmonary  "parenchyma.  Allowing for this, the airways are patent.  There is bilateral basilar dependent atelectasis.  No large focal consolidation.  No pneumothorax.  No pleural effusion.    Bolus timing is insufficient for evaluation of pulmonary thromboembolism noting weak opacification of the pulmonary arteries.  No large central pulmonary thromboembolism.  No definite pulmonary arterial filling defect to the proximal lobar branches however distal embolism cannot be excluded on the basis of this exam.  Examination is further limited secondary to motion artifact.  Correlation of these findings with D-dimer and/or lower extremity ultrasound as warranted.    There are degenerative changes of the spine.  No significant axillary lymphadenopathy.                                       X-Ray Chest PA And Lateral (Final result)  Result time 03/18/24 08:13:13      Final result by Dae Isbell MD (03/18/24 08:13:13)                   Impression:      No acute cardiopulmonary finding.      Electronically signed by: Dae Isbell MD  Date:    03/18/2024  Time:    08:13               Narrative:    EXAMINATION:  XR CHEST PA AND LATERAL    CLINICAL HISTORY:  Provided history is "Chest Pain;  ".    TECHNIQUE:  Frontal and lateral views of the chest were performed.    COMPARISON:  07/13/2023.    FINDINGS:  Cardiac wires overlie the chest.  Cardiomediastinal silhouette is not enlarged.  No focal consolidation.  No sizable pleural effusion.  No pneumothorax.                                      "

## 2024-03-19 NOTE — ED NOTES
Ambulated the patient with pulse ox. Pt stayed in the low 90's with the highest being 94% on room air. Nurse observed patient at rest while sitting on the bed and O2 stats remained in low 90's. Care on going.

## 2024-03-19 NOTE — ASSESSMENT & PLAN NOTE
Chronic, controlled. Latest blood pressure and vitals reviewed-     Temp:  [97.6 °F (36.4 °C)-98.6 °F (37 °C)]   Pulse:  []   Resp:  [16-20]   BP: (109-150)/(58-81)   SpO2:  [92 %-99 %] .   Home meds for hypertension were reviewed and noted below.   Hypertension Medications               lisinopril 10 MG tablet Take 1 tablet (10 mg total) by mouth once daily.    metoprolol succinate (TOPROL-XL) 50 MG 24 hr tablet Take 1 tablet (50 mg total) by mouth once daily.            While in the hospital, will manage blood pressure as follows; Continue home antihypertensive regimen    Will utilize p.r.n. blood pressure medication only if patient's blood pressure greater than 180/110 and he develops symptoms such as worsening chest pain or shortness of breath.    Continue home Lisinopril, Metoprolol, ASA 81 mg   - hold MTP prior to stress test, may resume after     Cardiac Diet

## 2024-03-20 VITALS
BODY MASS INDEX: 43.22 KG/M2 | RESPIRATION RATE: 18 BRPM | TEMPERATURE: 98 F | HEIGHT: 66 IN | DIASTOLIC BLOOD PRESSURE: 74 MMHG | WEIGHT: 268.94 LBS | OXYGEN SATURATION: 96 % | SYSTOLIC BLOOD PRESSURE: 124 MMHG | HEART RATE: 95 BPM

## 2024-03-20 LAB
ANION GAP SERPL CALC-SCNC: 11 MMOL/L (ref 8–16)
BASOPHILS # BLD AUTO: 0.06 K/UL (ref 0–0.2)
BASOPHILS NFR BLD: 0.5 % (ref 0–1.9)
BUN SERPL-MCNC: 16 MG/DL (ref 6–20)
CALCIUM SERPL-MCNC: 9.6 MG/DL (ref 8.7–10.5)
CHLORIDE SERPL-SCNC: 108 MMOL/L (ref 95–110)
CO2 SERPL-SCNC: 22 MMOL/L (ref 23–29)
CREAT SERPL-MCNC: 1 MG/DL (ref 0.5–1.4)
DIFFERENTIAL METHOD BLD: ABNORMAL
EOSINOPHIL # BLD AUTO: 0.1 K/UL (ref 0–0.5)
EOSINOPHIL NFR BLD: 1 % (ref 0–8)
ERYTHROCYTE [DISTWIDTH] IN BLOOD BY AUTOMATED COUNT: 13.1 % (ref 11.5–14.5)
EST. GFR  (NO RACE VARIABLE): >60 ML/MIN/1.73 M^2
GLUCOSE SERPL-MCNC: 81 MG/DL (ref 70–110)
HCT VFR BLD AUTO: 42.9 % (ref 40–54)
HGB BLD-MCNC: 14.2 G/DL (ref 14–18)
IMM GRANULOCYTES # BLD AUTO: 0.04 K/UL (ref 0–0.04)
IMM GRANULOCYTES NFR BLD AUTO: 0.3 % (ref 0–0.5)
LYMPHOCYTES # BLD AUTO: 2.6 K/UL (ref 1–4.8)
LYMPHOCYTES NFR BLD: 22.9 % (ref 18–48)
MAGNESIUM SERPL-MCNC: 1.7 MG/DL (ref 1.6–2.6)
MCH RBC QN AUTO: 31.6 PG (ref 27–31)
MCHC RBC AUTO-ENTMCNC: 33.1 G/DL (ref 32–36)
MCV RBC AUTO: 96 FL (ref 82–98)
MONOCYTES # BLD AUTO: 0.9 K/UL (ref 0.3–1)
MONOCYTES NFR BLD: 7.8 % (ref 4–15)
NEUTROPHILS # BLD AUTO: 7.8 K/UL (ref 1.8–7.7)
NEUTROPHILS NFR BLD: 67.5 % (ref 38–73)
NRBC BLD-RTO: 0 /100 WBC
PHOSPHATE SERPL-MCNC: 4.2 MG/DL (ref 2.7–4.5)
PLATELET # BLD AUTO: 265 K/UL (ref 150–450)
PMV BLD AUTO: 10.6 FL (ref 9.2–12.9)
POTASSIUM SERPL-SCNC: 4 MMOL/L (ref 3.5–5.1)
RBC # BLD AUTO: 4.49 M/UL (ref 4.6–6.2)
SODIUM SERPL-SCNC: 141 MMOL/L (ref 136–145)
WBC # BLD AUTO: 11.51 K/UL (ref 3.9–12.7)

## 2024-03-20 PROCEDURE — S4991 NICOTINE PATCH NONLEGEND: HCPCS | Performed by: PHYSICIAN ASSISTANT

## 2024-03-20 PROCEDURE — 94640 AIRWAY INHALATION TREATMENT: CPT | Mod: XB

## 2024-03-20 PROCEDURE — 25000242 PHARM REV CODE 250 ALT 637 W/ HCPCS

## 2024-03-20 PROCEDURE — 25000242 PHARM REV CODE 250 ALT 637 W/ HCPCS: Performed by: PHYSICIAN ASSISTANT

## 2024-03-20 PROCEDURE — 96374 THER/PROPH/DIAG INJ IV PUSH: CPT

## 2024-03-20 PROCEDURE — 63600175 PHARM REV CODE 636 W HCPCS

## 2024-03-20 PROCEDURE — G0378 HOSPITAL OBSERVATION PER HR: HCPCS

## 2024-03-20 PROCEDURE — 83735 ASSAY OF MAGNESIUM: CPT

## 2024-03-20 PROCEDURE — 80048 BASIC METABOLIC PNL TOTAL CA: CPT

## 2024-03-20 PROCEDURE — 25000003 PHARM REV CODE 250: Performed by: STUDENT IN AN ORGANIZED HEALTH CARE EDUCATION/TRAINING PROGRAM

## 2024-03-20 PROCEDURE — 85025 COMPLETE CBC W/AUTO DIFF WBC: CPT

## 2024-03-20 PROCEDURE — 36415 COLL VENOUS BLD VENIPUNCTURE: CPT

## 2024-03-20 PROCEDURE — 84100 ASSAY OF PHOSPHORUS: CPT

## 2024-03-20 PROCEDURE — 25000003 PHARM REV CODE 250: Performed by: PHYSICIAN ASSISTANT

## 2024-03-20 PROCEDURE — 63700000 PHARM REV CODE 250 ALT 637 W/O HCPCS

## 2024-03-20 PROCEDURE — 99900035 HC TECH TIME PER 15 MIN (STAT)

## 2024-03-20 PROCEDURE — 25000242 PHARM REV CODE 250 ALT 637 W/ HCPCS: Performed by: STUDENT IN AN ORGANIZED HEALTH CARE EDUCATION/TRAINING PROGRAM

## 2024-03-20 PROCEDURE — 94761 N-INVAS EAR/PLS OXIMETRY MLT: CPT

## 2024-03-20 RX ORDER — ALBUTEROL SULFATE 90 UG/1
1-2 AEROSOL, METERED RESPIRATORY (INHALATION) EVERY 4 HOURS PRN
Qty: 6.7 G | Refills: 3 | Status: SHIPPED | OUTPATIENT
Start: 2024-03-20

## 2024-03-20 RX ORDER — NICOTINE 7MG/24HR
1 PATCH, TRANSDERMAL 24 HOURS TRANSDERMAL DAILY
Qty: 7 PATCH | Refills: 1 | Status: SHIPPED | OUTPATIENT
Start: 2024-05-30 | End: 2024-06-13

## 2024-03-20 RX ORDER — AZITHROMYCIN 250 MG/1
TABLET, FILM COATED ORAL
Qty: 4 TABLET | Refills: 0 | Status: SHIPPED | OUTPATIENT
Start: 2024-03-21 | End: 2024-03-25

## 2024-03-20 RX ORDER — IBUPROFEN 200 MG
1 TABLET ORAL DAILY
Qty: 14 PATCH | Refills: 1 | Status: SHIPPED | OUTPATIENT
Start: 2024-05-02 | End: 2024-05-30

## 2024-03-20 RX ORDER — METOPROLOL SUCCINATE 25 MG/1
25 TABLET, EXTENDED RELEASE ORAL DAILY
Status: DISCONTINUED | OUTPATIENT
Start: 2024-03-20 | End: 2024-03-20 | Stop reason: HOSPADM

## 2024-03-20 RX ORDER — IBUPROFEN 200 MG
1 TABLET ORAL DAILY
Qty: 21 PATCH | Refills: 1 | Status: SHIPPED | OUTPATIENT
Start: 2024-03-21 | End: 2024-05-02

## 2024-03-20 RX ORDER — PREDNISONE 20 MG/1
60 TABLET ORAL DAILY
Qty: 9 TABLET | Refills: 0 | Status: SHIPPED | OUTPATIENT
Start: 2024-03-20 | End: 2024-03-23

## 2024-03-20 RX ADMIN — IPRATROPIUM BROMIDE AND ALBUTEROL SULFATE 3 ML: .5; 3 SOLUTION RESPIRATORY (INHALATION) at 12:03

## 2024-03-20 RX ADMIN — METHYLPREDNISOLONE SODIUM SUCCINATE 125 MG: 125 INJECTION, POWDER, FOR SOLUTION INTRAMUSCULAR; INTRAVENOUS at 02:03

## 2024-03-20 RX ADMIN — METOPROLOL SUCCINATE 25 MG: 25 TABLET, EXTENDED RELEASE ORAL at 08:03

## 2024-03-20 RX ADMIN — TIOTROPIUM BROMIDE INHALATION SPRAY 2 PUFF: 3.12 SPRAY, METERED RESPIRATORY (INHALATION) at 09:03

## 2024-03-20 RX ADMIN — AZITHROMYCIN DIHYDRATE 250 MG: 250 TABLET ORAL at 08:03

## 2024-03-20 RX ADMIN — Medication 1 PATCH: at 08:03

## 2024-03-20 RX ADMIN — IPRATROPIUM BROMIDE AND ALBUTEROL SULFATE 3 ML: .5; 3 SOLUTION RESPIRATORY (INHALATION) at 09:03

## 2024-03-20 RX ADMIN — ASPIRIN 81 MG: 81 TABLET, COATED ORAL at 08:03

## 2024-03-20 RX ADMIN — LISINOPRIL 10 MG: 10 TABLET ORAL at 08:03

## 2024-03-20 RX ADMIN — FLUTICASONE FUROATE AND VILANTEROL TRIFENATATE 1 PUFF: 200; 25 POWDER RESPIRATORY (INHALATION) at 08:03

## 2024-03-20 RX ADMIN — IPRATROPIUM BROMIDE AND ALBUTEROL SULFATE 3 ML: .5; 3 SOLUTION RESPIRATORY (INHALATION) at 04:03

## 2024-03-20 RX ADMIN — CLONAZEPAM 1 MG: 0.5 TABLET ORAL at 09:03

## 2024-03-20 RX ADMIN — HYDROCODONE BITARTRATE AND ACETAMINOPHEN 1 TABLET: 10; 325 TABLET ORAL at 09:03

## 2024-03-20 RX ADMIN — IPRATROPIUM BROMIDE AND ALBUTEROL SULFATE 3 ML: .5; 3 SOLUTION RESPIRATORY (INHALATION) at 01:03

## 2024-03-20 NOTE — NURSING
Tele called reported that monitor showing no tele. Went to patients room, patient is not in the room. His belongings are in the room. Possible went outside as spouse / daughter were here . Patient is also a smoker. Reported to tele, will follow up for patients return

## 2024-03-20 NOTE — PLAN OF CARE
Problem: Adult Inpatient Plan of Care  Goal: Plan of Care Review  Outcome: Ongoing, Progressing  Goal: Patient-Specific Goal (Individualized)  Outcome: Ongoing, Progressing  Goal: Absence of Hospital-Acquired Illness or Injury  Outcome: Ongoing, Progressing  Goal: Optimal Comfort and Wellbeing  Outcome: Ongoing, Progressing  Goal: Readiness for Transition of Care  Outcome: Ongoing, Progressing     Problem: Bariatric Environmental Safety  Goal: Safety Maintained with Care  Outcome: Ongoing, Progressing     Problem: Infection  Goal: Absence of Infection Signs and Symptoms  Outcome: Ongoing, Progressing     Problem: Hypertension Acute  Goal: Blood Pressure Within Desired Range  Outcome: Ongoing, Progressing     Problem: Adjustment to Illness COPD (Chronic Obstructive Pulmonary Disease)  Goal: Optimal Chronic Illness Coping  Outcome: Ongoing, Progressing     Problem: Functional Ability Impaired COPD (Chronic Obstructive Pulmonary Disease)  Goal: Optimal Level of Functional [place]  Outcome: Ongoing, Progressing     Problem: Infection COPD (Chronic Obstructive Pulmonary Disease)  Goal: Absence of Infection Signs and Symptoms  Outcome: Ongoing, Progressing     Problem: Oral Intake Inadequate COPD (Chronic Obstructive Pulmonary Disease)  Goal: Improved Nutrition Intake  Outcome: Ongoing, Progressing     Problem: Respiratory Compromise COPD (Chronic Obstructive Pulmonary Disease)  Goal: Effective Oxygenation and Ventilation  Outcome: Ongoing, Progressing     Problem: Adjustment to Device (Cardiac Rhythm Management Device)  Goal: Optimal Adjustment to Device  Outcome: Ongoing, Progressing     Problem: Bleeding (Cardiac Rhythm Management Device)  Goal: Absence of Bleeding  Outcome: Ongoing, Progressing     Problem: Device-Related Complication Risk (Cardiac Rhythm Management Device)  Goal: Effective Device Function  Outcome: Ongoing, Progressing     Problem: Infection (Cardiac Rhythm Management Device)  Goal: Absence of  Infection Signs and Symptoms  Outcome: Ongoing, Progressing     Problem: Pain (Cardiac Rhythm Management Device)  Goal: Acceptable Pain Level  Outcome: Ongoing, Progressing     Problem: Respiratory Compromise (Cardiac Rhythm Management Device)  Goal: Effective Oxygenation and Ventilation  Outcome: Ongoing, Progressing     Problem: Obstructive Sleep Apnea Risk or Actual  Goal: Unobstructed Breathing During Sleep  Outcome: Ongoing, Progressing     Problem: Activity Intolerance (Pulmonary Impairment)  Goal: Improved Activity Tolerance  Outcome: Ongoing, Progressing     Problem: Airway Clearance Ineffective (Pulmonary Impairment)  Goal: Effective Airway Clearance  Outcome: Ongoing, Progressing     Problem: Gas Exchange Impaired (Pulmonary Impairment)  Goal: Optimal Gas Exchange  Outcome: Ongoing, Progressing

## 2024-03-20 NOTE — DISCHARGE SUMMARY
"[facility] [address] [place] [address] Medicine  Discharge Summary      Patient Name: Cody Castro  MRN: [id number]  TISHA: [id number]  Patient Class: OP- Observation  Admission Date: 3/18/2024  Hospital Length of Stay: 0 days  Discharge Date and Time:  03/20/2024 1:51 PM  Attending Physician: Page att. providers found   Discharging Provider: Frieda Kumar PA-C  Primary Care Provider: Abad Patterson MD  [facility] Medicine Team: [facility] Frieda Kumar PA-C  Primary Care Team: [facility] MED Y    HPI:   Cody Castro is a 43-year-old with past medical history of CHF, CAD with hx MI s/p PCI, hypertension, obesity, anxiety, atrial fibrillation on Xarelto, and smoker with 25+ pack year hx presents with chief complaint of dyspnea on exertion onset 3-4 days ago. Patient endorses chronic dry cough he attributes to smoking but with newer productive cough over last week. Cough produces brown sputum. Onset of symptoms was gradual including LEVI, wheezing, fatigue.  Patient denies any chest tightness or CP, pain with inspiration, radiating pain to shoulders/jaw, diaphoresis, increased bilateral extremity edema, claudication, N/V, dizziness/lightheadedness or episodes of syncope.  Patient is not on home 02, notably provided rescue inhaler by PCP about 1 month ago but has since lost it. Otherwise endorses med compliance. Patient appears highly motivated for smoking cessation.     ED/ EDOU course: AF, Sp O2 94%, /58, Troponin trends negative x3, BNP negative, CBC CMP PT/INR d-dimer wnl. Triglycerides 193, LDL 93. Covid/ flu/ rsv neg. CXR negative. No evidence of DVT on Lower extremity US,  CTA-Chest  "Given current bolus timing and motion artifact, no definite pulmonary thromboembolism within the main pulmonary arteries, distal embolism cannot be definitively excluded.  Correlation of these findings with D-dimer and/or lower extremity ultrasound as warranted. 2. Possible hepatic steatosis, correlation with " "LFTs recommended. 3. No acute pulmonary process."   EKG  "Normal sinus rhythm  Questionable change in initial forces of Anterior leads"  Stress echo pending. Prednisone and Duonebs started in ED.       * No surgery found *      Hospital Course:   44 y/o with Pmh CHF, CAD prior stents,HTN, obesity, afib on xerelto with extensive smoking history. Presented with dyspnea on exertion and newly productive cough with brown sputum worsened from chronic dry smoking cough. O2 Sat at 93% on RA at rest, desat to 91% with ambulation in EDOU, prompting admission to . CXR negative for acute pulmonary process. Stress echo negative for reversible ischemia. Trop, BNP, Ddimer negative. CTA/US neg for DVT or PE. Adding azithromycin for empiric coverage of acute COPD exacerbation, prednisone. Dyspnea on exertion and O2 saturation much improved. Will d/c today with PCP and Pulm outpatient follow-up. Plan to finish abx and prednisone course outpatient. Refill albuterol, order tiotropium. Nicotine patches and referral to smoking cessation placed. On exam prior to discharge patient doing well and medically stable. Discharge plan discussed and patient expressed understanding. All questions answered.        Goals of Care Treatment Preferences:  Code Status: Full Code      Consults:     Pulmonary  * COPD exacerbation  Patient's COPD is with exacerbation noted by worsening of baseline hypoxia currently.  Patient is currently on COPD Pathway. Continue scheduled inhalers   , Steroids, Antibiotics, and Supplemental oxygen and monitor respiratory status closely.     - AFVSS, non-septic   - CXR without acute process   -Prednisone in EDOU; will trial IV methylprednisolone as pt O2 sats remain low. -Pt de-sat to 91% spO2 on room air during walk test, stable at rest   -Start Azithromycin x5 d  -Continue Duoneb treatment  -Patient does not use home O2  -Supplemental O2 as Needed   - will need to refill albuterol, consider starting tiotropium at dc   - " pulm referral at [facility]   - smoking cessation     - significant improvement in LEVI and O2 sats.   -Start Tiotropium  -Continue azithromycin and prednisone course as outpatient  -Provided Refill of rescue inhaler  -Will Provide Work note  -Follow up with PCP, Pulmonology, and smoking cessation        Final Active Diagnoses:    Diagnosis Date Noted POA    PRINCIPAL PROBLEM:  COPD exacerbation [J44.1] 03/19/2024 Yes    Dyspnea on exertion [R06.09] 03/18/2024 Yes    Morbid obesity [E66.01] 03/16/2022 Yes     Chronic    YOJANA (obstructive sleep apnea) [G47.33]  Yes     Chronic    Coronary artery disease involving native coronary artery of native heart without angina pectoris [I25.10] 06/22/2018 Yes     Chronic    Mixed hyperlipidemia [E78.2] 02/09/2013 Yes     Chronic    Paroxysmal A-fib [I48.0] 02/09/2013 Yes     Chronic    HTN (hypertension) [I10] 02/09/2013 Yes     Chronic    Cigarette smoker [F17.210] 02/09/2013 Yes     Chronic      Problems Resolved During this Admission:       Discharged Condition: good    Disposition: Home or Self Care    Follow Up:    Patient Instructions:      Ambulatory referral/consult to Sleep Disorders   Standing Status: Future   Referral Priority: Routine Referral Type: Consultation   Requested Specialty: Sleep Medicine   Number of Visits Requested: 1     Ambulatory referral/consult to Pulmonology   Standing Status: Future   Referral Priority: Routine Referral Type: Consultation   Referral Reason: Specialty Services Required   Requested Specialty: Pulmonary Disease   Number of Visits Requested: 1     Ambulatory referral/consult to Pulmonology   Standing Status: Future   Referral Priority: Urgent Referral Type: Consultation   Referral Reason: Specialty Services Required   Requested Specialty: Pulmonary Disease   Number of Visits Requested: 1     Ambulatory referral/consult to Internal Medicine   Standing Status: Future   Referral Priority: Routine Referral Type: Consultation   Referral Reason:  Specialty Services Required   Requested Specialty: Internal Medicine   Number of Visits Requested: 1     Diet Cardiac     Notify your health care provider if you experience any of the following:  temperature >100.4     Notify your health care provider if you experience any of the following:  persistent nausea and vomiting or diarrhea     Notify your health care provider if you experience any of the following:  severe uncontrolled pain     Notify your health care provider if you experience any of the following:  redness, tenderness, or signs of infection (pain, swelling, redness, odor or green/yellow discharge around incision site)     Notify your health care provider if you experience any of the following:  difficulty breathing or increased cough     Notify your health care provider if you experience any of the following:  severe persistent headache     Notify your health care provider if you experience any of the following:  persistent dizziness, light-headedness, or visual disturbances     Notify your health care provider if you experience any of the following:  increased confusion or weakness     Notify your health care provider if you experience any of the following:  worsening rash     Activity as tolerated       Significant Diagnostic Studies: N/A    Pending Diagnostic Studies:       None           Medications:  Reconciled Home Medications:      Medication List        START taking these medications      azithromycin 250 MG tablet  Commonly known as: Z-MANDO  One tablet daily for 4 days. Start taking 3/21/24.  Start taking on: March 21, 2024     * nicotine 21 mg/24 hr  Commonly known as: NICODERM CQ  Place 1 patch onto the skin once daily.  Start taking on: March 21, 2024     * nicotine 14 mg/24 hr  Commonly known as: NICODERM CQ  Place 1 patch onto the skin once daily.  Start taking on: May 2, 2024     * nicotine 7 mg/24 hr  Commonly known as: NICODERM CQ  Place 1 patch onto the skin once daily. for 14  days  Start taking on: May 30, 2024     predniSONE 20 MG tablet  Commonly known as: DELTASONE  Take 3 tablets (60 mg total) by mouth once daily. for 3 days     tiotropium bromide 2.5 mcg/actuation inhaler  Commonly known as: SPIRIVA RESPIMAT  Inhale 2 puffs into the lungs Daily. Controller           * This list has 3 medication(s) that are the same as other medications prescribed for you. Read the directions carefully, and ask your doctor or other care provider to review them with you.                CHANGE how you take these medications      albuterol 90 mcg/actuation inhaler  Commonly known as: PROVENTIL/VENTOLIN HFA  Inhale 1-2 puffs into the lungs every 4 (four) hours as needed for Wheezing or Shortness of Breath (rescue inhaler). Rescue  What changed:   when to take this  reasons to take this            CONTINUE taking these medications      aspirin 81 MG EC tablet  Commonly known as: ECOTRIN  Take 81 mg by mouth once daily.     clonazePAM 2 MG Tab  Commonly known as: KlonoPIN  Take 1 mg by mouth 2 (two) times daily as needed (for anxiety.).     HYDROcodone-acetaminophen  mg per tablet  Commonly known as: NORCO  Take 1 tablet by mouth every 6 (six) hours as needed (for back pain.).     lisinopriL 10 MG tablet  Take 1 tablet (10 mg total) by mouth once daily.     metoprolol succinate 50 MG 24 hr tablet  Commonly known as: TOPROL-XL  Take 1 tablet (50 mg total) by mouth once daily.     rivaroxaban 20 mg Tab  Commonly known as: XARELTO  Take 1 tablet (20 mg total) by mouth daily with dinner or evening meal.     rosuvastatin 40 MG Tab  Commonly known as: CRESTOR  take 1 tablet by mouth every evening              Indwelling Lines/Drains at time of discharge:   Lines/Drains/Airways       None                   Time spent on the discharge of patient: 36 minutes         Frieda Kumar PA-C  Department of Hospital Medicine  [facility] - Observation 11H

## 2024-03-20 NOTE — CARE UPDATE
03/20/2024      Cody Castro  [address] [address]          [facility] Medicine Dept.  [facility] [address] [address] [phone] after hours  [phone] fax Cody Castro has been hospitalized at the [facility] since 3/18/2024.  Please excuse the patient from duties.  Patient may return on 3/25/24.  No restrictions.     Please contact me if you have any questions.                  __________________________  Frieda Kumar PA-C  03/20/2024

## 2024-03-20 NOTE — ASSESSMENT & PLAN NOTE
Patient's COPD is with exacerbation noted by worsening of baseline hypoxia currently.  Patient is currently on COPD Pathway. Continue scheduled inhalers   , Steroids, Antibiotics, and Supplemental oxygen and monitor respiratory status closely.     - AFVSS, non-septic   - CXR without acute process   -Prednisone in EDOU; will trial IV methylprednisolone as pt O2 sats remain low. -Pt de-sat to 91% spO2 on room air during walk test, stable at rest   -Start Azithromycin x5 d  -Continue Duoneb treatment  -Patient does not use home O2  -Supplemental O2 as Needed   - will need to refill albuterol, consider starting tiotropium at dc   - pulm referral at [facility]   - smoking cessation     - significant improvement in LEVI and O2 sats.   -Start Tiotropium  -Continue azithromycin and prednisone course as outpatient  -Provided Refill of rescue inhaler  -Will Provide Work note  -Follow up with PCP, Pulmonology, and smoking cessation

## 2024-03-20 NOTE — PLAN OF CARE
03/20/24 1334   Final Note   Assessment Type Final Discharge Note   Anticipated Discharge Disposition Home   Hospital Resources/Appts/Education Provided Provided patient/caregiver with written discharge plan information   Post-Acute Status   Patient choice form signed by patient/caregiver List with quality metrics by geographic area provided   Discharge Delays None known at this time     Pt d/c home with family. No d/c needs reported by medical team at this time.      ALEXANDRE Gustafson  [facility]  Ext. [phone]

## 2024-03-20 NOTE — SUBJECTIVE & OBJECTIVE
Interval History:  NAEON, AF,. VSS. Dyspnea on exertion resolved. SpO2 much improved, stress echo with no acute concerns. Educated patient on smoking cessation and instructed on nicotine patch use. Will d/c with PCP and Pulm outpatient follow-up. Continue azithromycin and prednisone course outpatient, add tiotropium for maintenance therapy. All questions answered at bedside.     Review of Systems   Constitutional:  Negative for activity change, chills, diaphoresis, fatigue and fever.   HENT:  Negative for congestion, rhinorrhea and sore throat.    Respiratory:  Negative for cough, chest tightness, shortness of breath and wheezing.    Cardiovascular:  Negative for chest pain, palpitations and leg swelling.   Gastrointestinal:  Negative for abdominal distention, abdominal pain, blood in stool, constipation, diarrhea, nausea and vomiting.   Genitourinary:  Negative for decreased urine volume, difficulty urinating, dysuria, flank pain, frequency, hematuria and urgency.   Musculoskeletal:  Negative for arthralgias, back pain, myalgias and neck stiffness.   Neurological:  Negative for dizziness, tremors, seizures, syncope, weakness, light-headedness, numbness and headaches.   Psychiatric/Behavioral:  Negative for agitation, confusion and hallucinations.      Objective:     Vital Signs (Most Recent):  Temp: 98 °F (36.7 °C) (03/20/24 1123)  Pulse: 95 (03/20/24 1123)  Resp: 18 (03/20/24 1123)  BP: 124/74 (03/20/24 1123)  SpO2: 95 % (03/20/24 1123) Vital Signs (24h Range):  Temp:  [97.6 °F (36.4 °C)-98.1 °F (36.7 °C)] 98 °F (36.7 °C)  Pulse:  [] 95  Resp:  [16-20] 18  SpO2:  [94 %-97 %] 95 %  BP: (124-157)/(58-76) 124/74     Weight: 122 kg (268 lb 15.4 oz)  Body mass index is 43.41 kg/m².    Intake/Output Summary (Last 24 hours) at 3/20/2024 1213  Last data filed at 3/20/2024 0346  Gross per 24 hour   Intake 500 ml   Output --   Net 500 ml         Physical Exam  Vitals and nursing note reviewed.   Constitutional:        General: He is not in acute distress.     Appearance: He is well-developed. He is not diaphoretic.   HENT:      Head: Normocephalic and atraumatic.      Right Ear: External ear normal.      Left Ear: External ear normal.      Nose: Nose normal. No congestion.      Mouth/Throat:      Pharynx: Oropharynx is clear.   Eyes:      General: No scleral icterus.     Extraocular Movements: Extraocular movements intact.   Cardiovascular:      Rate and Rhythm: Normal rate and regular rhythm.      Pulses: Normal pulses.      Heart sounds: Normal heart sounds. No murmur heard.  Pulmonary:      Effort: Pulmonary effort is normal. No respiratory distress.      Breath sounds: Normal breath sounds. No wheezing or rales.   Abdominal:      General: Bowel sounds are normal. There is no distension.      Palpations: Abdomen is soft.      Tenderness: There is no abdominal tenderness. There is no guarding or rebound.   Musculoskeletal:      Cervical back: Normal range of motion.      Right lower leg: No edema.      Left lower leg: No edema.   Skin:     General: Skin is warm and dry.      Capillary Refill: Capillary refill takes less than 2 seconds.   Neurological:      General: No focal deficit present.      Mental Status: He is alert and oriented to person, place, and time. Mental status is at baseline.   Psychiatric:         Mood and Affect: Mood normal.         Behavior: Behavior normal.         Thought Content: Thought content normal.             Significant Labs: All pertinent labs within the past 24 hours have been reviewed.    Significant Imaging: I have reviewed all pertinent imaging results/findings within the past 24 hours.

## 2024-03-20 NOTE — PHARMACY MED REC
"      Admission Medication History     The home medication history was taken by Cortes Escudero.    You may go to "Admission" then "Reconcile Home Medications" tabs to review and/or act upon these items.     The home medication list has been updated by the Pharmacy department.   Please read ALL comments highlighted in yellow.   Please address this information as you see fit.    Feel free to contact us if you have any questions or require assistance.      The medications listed below were removed from the home medication list. Please reorder if appropriate:  Patient reports no longer taking the following medication(s):  CLOPIDOGREL 75 MG TABLET  EZETIMIBE 10 MG TABLET  NICOTINE 21 MG PATCH    Medications listed below were obtained from: Patient/family and [employer] Medications   Medication Sig    aspirin (ECOTRIN) 81 MG EC tablet   Take 81 mg by mouth once daily.    clonazePAM (KLONOPIN) 2 MG Tab   Take 1 mg by mouth 2 (two) times daily as needed for anxiety.    HYDROcodone-acetaminophen (NORCO)  mg per tablet   Take 1 tablet by mouth every 6 (six) hours as needed for back pain.    lisinopril 10 MG tablet   Take 1 tablet (10 mg total) by mouth once daily.    metoprolol succinate (TOPROL-XL) 50 MG 24 hr tablet   Take 1 tablet (50 mg total) by mouth once daily.    rivaroxaban (XARELTO) 20 mg Tab   Take 1 tablet (20 mg total) by mouth daily with dinner or evening meal.    rosuvastatin (CRESTOR) 40 MG Tab   take 1 tablet by mouth every evening    albuterol (PROVENTIL/VENTOLIN HFA) 90 mcg/actuation inhaler   Inhale 1-2 puffs into the lungs every 6 (six) hours as needed for Wheezing. Rescue   (Patient not taking: Reported on 3/19/2024)       Potential issues to be addressed PRIOR TO DISCHARGE  Please discuss with the patient barriers to adherence with medication treatment plans  Patient requires education regarding drug therapies     Cortes Escudero  EXT [phone]            .          "

## 2024-06-24 DIAGNOSIS — I10 PRIMARY HYPERTENSION: Chronic | ICD-10-CM

## 2024-06-24 DIAGNOSIS — E78.5 HYPERLIPIDEMIA, UNSPECIFIED HYPERLIPIDEMIA TYPE: ICD-10-CM

## 2024-06-24 RX ORDER — METOPROLOL SUCCINATE 50 MG/1
50 TABLET, EXTENDED RELEASE ORAL DAILY
Qty: 90 TABLET | Refills: 0 | Status: SHIPPED | OUTPATIENT
Start: 2024-06-24 | End: 2025-06-24

## 2024-06-24 RX ORDER — ROSUVASTATIN CALCIUM 40 MG/1
40 TABLET, COATED ORAL
Qty: 90 TABLET | Refills: 0 | Status: SHIPPED | OUTPATIENT
Start: 2024-06-24

## 2024-07-01 ENCOUNTER — OFFICE VISIT (OUTPATIENT)
Dept: CARDIOLOGY | Facility: CLINIC | Age: 44
End: 2024-07-01
Payer: COMMERCIAL

## 2024-07-01 VITALS
DIASTOLIC BLOOD PRESSURE: 80 MMHG | SYSTOLIC BLOOD PRESSURE: 152 MMHG | BODY MASS INDEX: 45.53 KG/M2 | WEIGHT: 283.31 LBS | OXYGEN SATURATION: 94 % | HEIGHT: 66 IN | HEART RATE: 76 BPM

## 2024-07-01 DIAGNOSIS — I10 PRIMARY HYPERTENSION: Primary | Chronic | ICD-10-CM

## 2024-07-01 DIAGNOSIS — E66.01 MORBID OBESITY: Chronic | ICD-10-CM

## 2024-07-01 DIAGNOSIS — I25.10 CORONARY ARTERY DISEASE INVOLVING NATIVE CORONARY ARTERY OF NATIVE HEART WITHOUT ANGINA PECTORIS: Chronic | ICD-10-CM

## 2024-07-01 DIAGNOSIS — G47.33 OSA (OBSTRUCTIVE SLEEP APNEA): Chronic | ICD-10-CM

## 2024-07-01 DIAGNOSIS — I48.0 PAROXYSMAL A-FIB: Chronic | ICD-10-CM

## 2024-07-01 DIAGNOSIS — E78.2 MIXED HYPERLIPIDEMIA: Chronic | ICD-10-CM

## 2024-07-01 PROCEDURE — 3077F SYST BP >= 140 MM HG: CPT | Mod: CPTII,S$GLB,, | Performed by: PHYSICIAN ASSISTANT

## 2024-07-01 PROCEDURE — 1160F RVW MEDS BY RX/DR IN RCRD: CPT | Mod: CPTII,S$GLB,, | Performed by: PHYSICIAN ASSISTANT

## 2024-07-01 PROCEDURE — 3079F DIAST BP 80-89 MM HG: CPT | Mod: CPTII,S$GLB,, | Performed by: PHYSICIAN ASSISTANT

## 2024-07-01 PROCEDURE — 99214 OFFICE O/P EST MOD 30 MIN: CPT | Mod: S$GLB,,, | Performed by: PHYSICIAN ASSISTANT

## 2024-07-01 PROCEDURE — 4010F ACE/ARB THERAPY RXD/TAKEN: CPT | Mod: CPTII,S$GLB,, | Performed by: PHYSICIAN ASSISTANT

## 2024-07-01 PROCEDURE — 3008F BODY MASS INDEX DOCD: CPT | Mod: CPTII,S$GLB,, | Performed by: PHYSICIAN ASSISTANT

## 2024-07-01 PROCEDURE — 99999 PR PBB SHADOW E&M-EST. PATIENT-LVL V: CPT | Mod: PBBFAC,,, | Performed by: PHYSICIAN ASSISTANT

## 2024-07-01 PROCEDURE — 1159F MED LIST DOCD IN RCRD: CPT | Mod: CPTII,S$GLB,, | Performed by: PHYSICIAN ASSISTANT

## 2024-07-01 RX ORDER — EZETIMIBE 10 MG/1
10 TABLET ORAL DAILY
Qty: 90 TABLET | Refills: 3 | Status: SHIPPED | OUTPATIENT
Start: 2024-07-01 | End: 2025-07-01

## 2024-07-01 RX ORDER — METOPROLOL SUCCINATE 50 MG/1
50 TABLET, EXTENDED RELEASE ORAL DAILY
Qty: 90 TABLET | Refills: 3 | Status: SHIPPED | OUTPATIENT
Start: 2024-07-01 | End: 2025-07-01

## 2024-07-01 NOTE — PROGRESS NOTES
General Cardiology Clinic Note  Reason for Visit: Follow up   Last Clinic Visit: 6/13/2023 with Dr. Lewis     HPI:   Cody Castro is a 43 y.o. male who presents for Follow-up and Medication Refill (METOPROLOL)    Problems:  CAD s/p PCI (LAD, D1 2013 and RCA, OM1 2018)   HFrEF (recovered)  AFib   HTN  HLD  Hx of TIA  tobacco use  YOJANA    Interval HPI  Since his last visit, he has had two hospitalizations. He was admitted in July 2023 for TIA that presented as sudden R vision change. He was put on Plavix for 21 days.     He was admitted again in March for LEVI and cough. Stress echo negative for reversible ischemia. Trop, BNP, Ddimer negative. CTA/US neg for DVT or PE. He was treated for COPD exacerbation.     Patient presents for annual. Denies symptoms of CAD, CHF, arrhythmia, hypotension, TIA. He has gained a significant amount of weight, about 30 lbs over the past year. He states he has been eating poorly and does not exercise. On a positive note, he quit smoking two weeks ago. He has been out of Metoprolol for a couple weeks.     6/13/2023 HPI (Dr. Lewis)  42 year old male with CAD s/p PCI (LAD, D1 2013 and RCA, OM1 2018), HFrEF (recovered), AFib, HTN, HLD, tobacco use here for annual follow-up. First visit with me.     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 He drinks 5-6 gatorades per day and about 6 bottles of water.      He did not tolerate 100 mg of metoprolol which makes him fatigued and he has been cutting them in half. He snores and falls asleep in the daytime when he is the passenger in the car.     He denies chest pain/pressure/tightness/discomfort, dyspnea on regular exertion, orthopnea, peripheral edema, palpitations, syncope or claudication. He has PND.     He smokes 1 PPD and is interested in quitting. He seldom drinks alcohol.          ROS:      Review  of Systems   Constitutional: Positive for weight gain. Negative for diaphoresis, malaise/fatigue and weight loss.   HENT:  Negative for nosebleeds.    Eyes:  Negative for vision loss in left eye, vision loss in right eye and visual disturbance.   Cardiovascular:  Negative for chest pain, claudication, dyspnea on exertion, irregular heartbeat, leg swelling, near-syncope, orthopnea, palpitations, paroxysmal nocturnal dyspnea and syncope.   Respiratory:  Negative for cough, shortness of breath, sleep disturbances due to breathing, snoring and wheezing.    Hematologic/Lymphatic: Negative for bleeding problem. Does not bruise/bleed easily.   Skin:  Negative for poor wound healing and rash.   Musculoskeletal:  Negative for muscle cramps and myalgias.   Gastrointestinal:  Negative for bloating, abdominal pain, diarrhea, heartburn, melena, nausea and vomiting.   Genitourinary:  Negative for hematuria and nocturia.   Neurological:  Negative for brief paralysis, dizziness, headaches, light-headedness, numbness and weakness.   Psychiatric/Behavioral:  Negative for depression.    Allergic/Immunologic: Negative for hives.       PMH:     Past Medical History:   Diagnosis Date    Anxiety     Carbon monoxide poisoning     Caused a seizure    Chronic combined systolic and diastolic heart failure 6/22/2018    Chronic systolic congestive heart failure 11/15/2019    COPD exacerbation 3/19/2024    Coronary artery disease     History of heart artery stent 2013    Hypertension     Ischemic cardiomyopathy 3/16/2022    EF 35% improved to 50%    NSTEMI (non-ST elevated myocardial infarction) 2/10/2013    Obesity     YOJANA (obstructive sleep apnea)     Paroxysmal A-fib 2/9/2013     Past Surgical History:   Procedure Laterality Date    CORONARY ANGIOPLASTY      LEFT HEART CATHETERIZATION Right 6/25/2018    Procedure: Left heart cath;  Surgeon: Dieter Vargas MD;  Location: [facility] CATH LAB;  Service: Cardiology;  Laterality: Right;  "    Allergies:   Review of patient's allergies indicates:  No Known Allergies  Medications:     Current Outpatient Medications on File Prior to Visit   Medication Sig Dispense Refill    albuterol (PROVENTIL/VENTOLIN HFA) 90 mcg/actuation inhaler Inhale 1-2 puffs into the lungs every 4 (four) hours as needed for Wheezing or Shortness of Breath (rescue inhaler). Rescue 6.7 g 3    aspirin (ECOTRIN) 81 MG EC tablet Take 81 mg by mouth once daily.      clonazePAM (KLONOPIN) 2 MG Tab Take 1 mg by mouth 2 (two) times daily as needed (for anxiety.).      HYDROcodone-acetaminophen (NORCO)  mg per tablet Take 1 tablet by mouth every 6 (six) hours as needed (for back pain.).      lisinopril 10 MG tablet Take 1 tablet (10 mg total) by mouth once daily. 90 tablet 17    metoprolol succinate (TOPROL-XL) 50 MG 24 hr tablet Take 1 tablet (50 mg total) by mouth once daily. 90 tablet 0    rivaroxaban (XARELTO) 20 mg Tab Take 1 tablet (20 mg total) by mouth daily with dinner or evening meal. 90 tablet 0    rosuvastatin (CRESTOR) 40 MG Tab take 1 tablet by mouth every evening 90 tablet 0    tiotropium bromide (SPIRIVA RESPIMAT) 2.5 mcg/actuation inhaler Inhale 2 puffs into the lungs Daily. Controller 4 g 1     No current facility-administered medications on file prior to visit.     Social History:     Social History     Tobacco Use    Smoking status: Every Day     Current packs/day: 1.25     Average packs/day: 1.3 packs/day for 27.5 years (34.4 ttl pk-yrs)     Types: Cigarettes     Start date: 01/1997    Smokeless tobacco: Never   Substance Use Topics    Alcohol use: Not Currently     Comment: occasional. none in 2 months     Family History:     Family History   Problem Relation Name Age of Onset    Heart disease Mother      Heart disease Father       Physical Exam:   BP (!) 152/80   Pulse 76   Ht 5' 6" (1.676 m)   Wt 128.5 kg (283 lb 4.7 oz)   SpO2 (!) 94%   BMI 45.72 kg/m²        Physical Exam  Vitals and nursing note " reviewed.   Constitutional:       General: He is not in acute distress.     Appearance: Normal appearance. He is obese.   HENT:      Head: Normocephalic and atraumatic.      Nose: Nose normal.   Eyes:      General: No scleral icterus.     Extraocular Movements: Extraocular movements intact.      Conjunctiva/sclera: Conjunctivae normal.   Neck:      Thyroid: No thyromegaly.      Vascular: No carotid bruit or JVD.   Cardiovascular:      Rate and Rhythm: Normal rate and regular rhythm.      Pulses: Normal pulses.      Heart sounds: Normal heart sounds. No murmur heard.     No friction rub. No gallop.   Pulmonary:      Effort: Pulmonary effort is normal.      Breath sounds: Normal breath sounds. No wheezing, rhonchi or rales.   Chest:      Chest wall: No tenderness.   Abdominal:      General: Bowel sounds are normal. There is no distension.      Palpations: Abdomen is soft.      Tenderness: There is no abdominal tenderness.   Musculoskeletal:      Cervical back: Neck supple.      Right lower leg: No edema.      Left lower leg: No edema.   Skin:     General: Skin is warm and dry.      Coloration: Skin is not pale.      Findings: No erythema or rash.      Nails: There is no clubbing.   Neurological:      Mental Status: He is alert and oriented to person, place, and time.   Psychiatric:         Mood and Affect: Mood and affect normal.         Behavior: Behavior normal.          Labs:     Lab Results   Component Value Date     03/20/2024    K 4.0 03/20/2024     03/20/2024    CO2 22 (L) 03/20/2024    BUN 16 03/20/2024    CREATININE 1.0 03/20/2024    ANIONGAP 11 03/20/2024     Lab Results   Component Value Date    HGBA1C 5.1 06/22/2018     Lab Results   Component Value Date    BNP 19 03/18/2024    BNP 16 02/22/2023    BNP 49 09/20/2022    Lab Results   Component Value Date    WBC 11.51 03/20/2024    HGB 14.2 03/20/2024    HCT 42.9 03/20/2024    HCT 46 09/23/2016     03/20/2024    GRAN 7.8 (H) 03/20/2024     GRAN 67.5 03/20/2024     Lab Results   Component Value Date    CHOL 164 03/18/2024    HDL 32 (L) 03/18/2024    LDLCALC 93.4 03/18/2024    TRIG 193 (H) 03/18/2024          Imaging:   Echocardiograms:   TTE 4/18/2022  The left ventricle is normal in size with normal systolic function. The estimated ejection fraction is 55%.  Normal right ventricular size with normal right ventricular systolic function.  Normal left ventricular diastolic function.  Normal central venous pressure (3 mmHg).    TTE 4/21/2021  The left ventricle is normal in size with mildly decreased systolic function.  The estimated ejection fraction is 50% or slightly higher.  There is borderline low to mild left ventricular global hypokinesis.  Grade I left ventricular diastolic dysfunction.  Mild left atrial enlargement.  Normal right ventricular size with normal right ventricular systolic function.  Normal central venous pressure (3 mmHg).  The estimated PA systolic pressure is 20 mmHg.    TTE 10/18/2019  Moderately decreased left ventricular systolic function. The estimated ejection fraction is 38%  Local segmental wall motion abnormalities.  Grade III (severe) left ventricular diastolic dysfunction consistent with restrictive physiology.  Mild left atrial enlargement.  Normal right ventricular systolic function.  Mild right atrial enlargement.  Normal central venous pressure (3 mm Hg).  The estimated PA systolic pressure is 19 mm Hg    Stress Tests:   Stress echo 3/19/2024    Left Ventricle: Regional wall motion abnormalities present. There is normal systolic function. Ejection fraction by visual approximation is 55%. Biplane (2D) method of discs ejection fraction is 50%. There is normal diastolic function.    Right Ventricle: Normal right ventricular cavity size. Systolic function is normal.    Left Atrium: Left atrium is mildly dilated.    Pulmonary Artery: The estimated pulmonary artery systolic pressure is 25 mmHg.    IVC/SVC: Normal venous  pressure at 3 mmHg.    Stress Protocol: The patient reported no symptoms during the stress test. The test was stopped because the end of the protocol was reached.    Baseline ECG: The Baseline ECG reveals sinus rhythm. The axis is normal. The ST segments are normal.    Stress ECG: There is 0.5 mm of upward-sloping ST segment depression in the inferolateral leads (II, III, aVF, V5 and V6) noted during stress. There are no arrhythmias during stress. There is normal blood pressure response with stress.    ECG Conclusion: The ECG portion of the study is negative for ischemia.    Post-stress Impression: The study is consistent with scar and negative with no echocardiographic evidence of stress induced ischemia.    There is a small area of akinesis in the anteroseptal wall with increased echogenicity, consistent with prior myocardial scar.  This area does not augment, however the remaineder of myocardium does, suggestive no ischemia.    PET Stress Test 8/8/2023    There are no clinically significant perfusion abnormalities. There is a small (<10%) amount of mild resting heterogeneity in the mid to distal anteroseptal wall(s) that does not change with stress, indicative of non-obstructive disease.    The whole heart absolute myocardial perfusion values averaged 0.46 cc/min/g at rest, which is reduced; 1.09 cc/min/g at stress, which is mildly reduced; and CFR is 2.38 , which is low normal.    The stress flow is reduced diffusely throughout the heart consistent with the presence of CAD, but above ischemic thresholds.    CT attenuation images demonstrate severe diffuse coronary calcifications in the LAD, LCX and RCA territory and no aortic calcifications.    The gated perfusion images showed an ejection fraction of 49% at rest and 51% during stress. A normal ejection fraction is greater than 47%.    The wall motion is normal at rest and during stress.    The LV cavity size is mildly enlarged at rest and stress.    The ECG  portion of the study is negative for ischemia.    There were no arrhythmias during stress.    The patient reported no chest pain during the stress test.    There are no prior studies for comparison.    Nuclear stress test 10/18/2019    There is a moderate to large sized, moderate to severe intensity, fixed defect in the mid to apical anteroseptal wall(s) in the typical distribution of the LAD territory.    Gated perfusion images showed an ejection fraction of 40.0 % at rest and 45 % post stress. Normal is > 51%.    There is  hypokinesis at rest and post-stress of the mid to apical anteroseptal wall.    LV cavity size is normal at rest and mildly enlarged at stress.    The EKG portion of this study is negative for ischemia.    The patient reported no chest pain during the stress test.    Arrhythmias during stress: rare PVCs.    The exercise capacity was mildly impaired.    The blood pressure response to exercise was normal.    Caths:   [facility] 6/25/2018    LVEDP=20mmHg.     Patent LAD stents.     Successful PCI of 70% (FFR=0.70) distal RCA stenosis with 3.5X12 MANJINDER.     Successful PCI of 70% (FFR=0.77) OM1 stenosis with 4X30 MANJINDER.     Other:  Stroke CTA 7/28/2023     No acute intracranial process.     No significant stenosis at the carotid bifurcations by NASCET criteria.  The vertebral arteries are patent.  No evidence of dissection.     No major branch stenosis/occlusion at the [id number].     No gross orbital abnormality on limited evaluation.       Carotid ultrasound 6/22/2018    There is 0 - 19% right Internal Carotid stenosis.   There is 0 - 19% left Internal Carotid stenosis.       Assessment:     1. Primary hypertension    2. Mixed hyperlipidemia    3. Paroxysmal A-fib    4. Coronary artery disease involving native coronary artery of native heart without angina pectoris    5. Morbid obesity    6. YOJANA (obstructive sleep apnea)      Plan:     Hypertension  Uncontrolled but has been out of Metoprolol  Refilled  Metoprolol succinate 50 mg daily  Continue Lisinopril 10 mg daily     Hyperlipidemia  LDL above goal  Start Zetia 10 mg daily  Continue Crestor 40 mg daily   Repeat lipid panel in 3 months.    Paroxysmal atrial fibrillation  Asymptomatic. Continue Xarelto for CVA prophylaxis    CAD s/p multiple PCIs  Denies angina. Stress echo this year negative for ischemia.   Continue ASA, statin. Start zetia, as above.     Morbid obesity  Refer to bariatric medicine    YOJANA  He has an appt with sleep medicine scheduled. He needs to get tested.     Follow up in 4 months.     Signed:  Cathie Spicer PA-C  Cardiology   [phone] - General

## 2024-07-15 ENCOUNTER — CLINICAL SUPPORT (OUTPATIENT)
Dept: SMOKING CESSATION | Facility: CLINIC | Age: 44
End: 2024-07-15

## 2024-07-15 DIAGNOSIS — F17.200 NICOTINE DEPENDENCE, UNCOMPLICATED: Primary | ICD-10-CM

## 2024-07-15 NOTE — PROGRESS NOTES
Spoke with patient today in regard to smoking cessation progress for 12 month follow up. He states he is not tobacco free. Patient has scheduled appointment to return to the program for Quit attempt # 2. Informed patient of benefit period, future follow ups and contact information if any further help is needed. Will resolve smart form for 12 month follow up for Quit # 1.

## 2024-07-23 RX ORDER — LISINOPRIL 10 MG/1
10 TABLET ORAL DAILY
Qty: 90 TABLET | Refills: 17 | Status: SHIPPED | OUTPATIENT
Start: 2024-07-23 | End: 2028-12-26

## 2024-07-29 ENCOUNTER — TELEPHONE (OUTPATIENT)
Dept: SMOKING CESSATION | Facility: CLINIC | Age: 44
End: 2024-07-29
Payer: COMMERCIAL

## 2024-07-29 NOTE — TELEPHONE ENCOUNTER
Smoking Cessation Clinic- called patient no show for virtual/audio appointment. Left message to call back to reschedule [phone] or  [phone].

## 2024-07-30 ENCOUNTER — OFFICE VISIT (OUTPATIENT)
Dept: SLEEP MEDICINE | Facility: CLINIC | Age: 44
End: 2024-07-30
Payer: COMMERCIAL

## 2024-07-30 VITALS
HEIGHT: 66 IN | WEIGHT: 287.69 LBS | BODY MASS INDEX: 46.23 KG/M2 | SYSTOLIC BLOOD PRESSURE: 138 MMHG | DIASTOLIC BLOOD PRESSURE: 81 MMHG | HEART RATE: 94 BPM

## 2024-07-30 DIAGNOSIS — R06.81 WITNESSED EPISODE OF APNEA: ICD-10-CM

## 2024-07-30 DIAGNOSIS — R35.1 NOCTURIA: ICD-10-CM

## 2024-07-30 DIAGNOSIS — G47.33 OSA (OBSTRUCTIVE SLEEP APNEA): Primary | ICD-10-CM

## 2024-07-30 DIAGNOSIS — I10 HYPERTENSION, UNSPECIFIED TYPE: Chronic | ICD-10-CM

## 2024-07-30 DIAGNOSIS — F51.09 OTHER INSOMNIA NOT DUE TO A SUBSTANCE OR KNOWN PHYSIOLOGICAL CONDITION: ICD-10-CM

## 2024-07-30 DIAGNOSIS — J44.9 CHRONIC OBSTRUCTIVE PULMONARY DISEASE, UNSPECIFIED COPD TYPE: ICD-10-CM

## 2024-07-30 DIAGNOSIS — I48.0 PAROXYSMAL A-FIB: Chronic | ICD-10-CM

## 2024-07-30 DIAGNOSIS — G47.10 HYPERSOMNOLENCE: ICD-10-CM

## 2024-07-30 DIAGNOSIS — R06.83 SNORING: ICD-10-CM

## 2024-07-30 PROCEDURE — 99204 OFFICE O/P NEW MOD 45 MIN: CPT | Mod: S$GLB,,, | Performed by: PHYSICIAN ASSISTANT

## 2024-07-30 PROCEDURE — 4010F ACE/ARB THERAPY RXD/TAKEN: CPT | Mod: CPTII,S$GLB,, | Performed by: PHYSICIAN ASSISTANT

## 2024-07-30 PROCEDURE — 3008F BODY MASS INDEX DOCD: CPT | Mod: CPTII,S$GLB,, | Performed by: PHYSICIAN ASSISTANT

## 2024-07-30 PROCEDURE — 3079F DIAST BP 80-89 MM HG: CPT | Mod: CPTII,S$GLB,, | Performed by: PHYSICIAN ASSISTANT

## 2024-07-30 PROCEDURE — 1159F MED LIST DOCD IN RCRD: CPT | Mod: CPTII,S$GLB,, | Performed by: PHYSICIAN ASSISTANT

## 2024-07-30 PROCEDURE — 1160F RVW MEDS BY RX/DR IN RCRD: CPT | Mod: CPTII,S$GLB,, | Performed by: PHYSICIAN ASSISTANT

## 2024-07-30 PROCEDURE — 99999 PR PBB SHADOW E&M-EST. PATIENT-LVL IV: CPT | Mod: PBBFAC,,, | Performed by: PHYSICIAN ASSISTANT

## 2024-07-30 PROCEDURE — 3075F SYST BP GE 130 - 139MM HG: CPT | Mod: CPTII,S$GLB,, | Performed by: PHYSICIAN ASSISTANT

## 2024-07-30 NOTE — PROGRESS NOTES
Referred by Carmen Durán PA-C     NEW PATIENT VISIT  Previously evaluated in sleep medicine by Dr Reeves, last visit 5/31/21    Cody Castro  is a pleasant 43 y.o. male  with PMH significant for HTN, HLD, CAD, Afib, COPD, smoker, BMI 46+, YOJANA who presents for YOJANA management      Reports dx YOJANA 2021 (AHI 22, RDI 38) following c/o snoring, gasping arousals, witnessed apneas, poor disrupted and often un-refreshing sleep, and excessive daytime sleepiness and fatigue. States at the time he was advised CPAP would cost $250+ and he was hesitant about CPAP, so when he heard price he decided against it. States now sx have persisted and progressed and he was dx with Afib. Presents today as he is now ready to pursue tx for his YOJANA with CPAP.      Past Medical History:   Diagnosis Date    Anxiety     Carbon monoxide poisoning     Caused a seizure    Chronic combined systolic and diastolic heart failure 6/22/2018    Chronic systolic congestive heart failure 11/15/2019    COPD exacerbation 3/19/2024    Coronary artery disease     History of heart artery stent 2013    Hypertension     Ischemic cardiomyopathy 3/16/2022    EF 35% improved to 50%    NSTEMI (non-ST elevated myocardial infarction) 2/10/2013    Obesity     YOJANA (obstructive sleep apnea)     Paroxysmal A-fib 2/9/2013     Patient Active Problem List   Diagnosis    HTN (hypertension)    Mixed hyperlipidemia    Cigarette smoker    Paroxysmal A-fib    Coronary artery disease involving native coronary artery of native heart without angina pectoris    YOJANA (obstructive sleep apnea)    Chronic anticoagulation    Morbid obesity    Episode of transient neurologic symptoms    Dyspnea on exertion    COPD exacerbation       Current Outpatient Medications:     albuterol (PROVENTIL/VENTOLIN HFA) 90 mcg/actuation inhaler, Inhale 1-2 puffs into the lungs every 4 (four) hours as needed for Wheezing or Shortness of Breath (rescue inhaler). Rescue, Disp: 6.7 g, Rfl: 3    aspirin  "(ECOTRIN) 81 MG EC tablet, Take 81 mg by mouth once daily., Disp: , Rfl:     clonazePAM (KLONOPIN) 2 MG Tab, Take 1 mg by mouth 2 (two) times daily as needed (for anxiety.)., Disp: , Rfl:     ezetimibe (ZETIA) 10 mg tablet, Take 1 tablet (10 mg total) by mouth once daily., Disp: 90 tablet, Rfl: 3    HYDROcodone-acetaminophen (NORCO)  mg per tablet, Take 1 tablet by mouth every 6 (six) hours as needed (for back pain.)., Disp: , Rfl:     lisinopriL 10 MG tablet, Take 1 tablet (10 mg total) by mouth once daily., Disp: 90 tablet, Rfl: 17    metoprolol succinate (TOPROL-XL) 50 MG 24 hr tablet, Take 1 tablet (50 mg total) by mouth once daily., Disp: 90 tablet, Rfl: 3    rivaroxaban (XARELTO) 20 mg Tab, Take 1 tablet (20 mg total) by mouth daily with dinner or evening meal., Disp: 90 tablet, Rfl: 0    rosuvastatin (CRESTOR) 40 MG Tab, take 1 tablet by mouth every evening, Disp: 90 tablet, Rfl: 0    tiotropium bromide (SPIRIVA RESPIMAT) 2.5 mcg/actuation inhaler, Inhale 2 puffs into the lungs Daily. Controller, Disp: 4 g, Rfl: 1       Vitals:    07/30/24 1541   BP: 138/81   BP Location: Right arm   Patient Position: Sitting   BP Method: Medium (Automatic)   Pulse: 94   Weight: 130.5 kg (287 lb 11.2 oz)   Height: 5' 6" (1.676 m)     Physical Exam:    GEN:   Well-appearing  Psych:  Appropriate affect, demonstrates insight  SKIN:  No rash on the face or bridge of the nose      LABS:   Lab Results   Component Value Date    HGB 14.2 03/20/2024    CO2 22 (L) 03/20/2024         RECORDS REVIEWED:    6/3/21 HST: AHI 22, RDI 38    Previous sleep note: 5/31/21 4/18/22 Echo:  The left ventricle is normal in size with normal systolic function. The estimated ejection fraction is 55%.  Normal right ventricular size with normal right ventricular systolic function.  Normal left ventricular diastolic function.  Normal central venous pressure (3 mmHg).    ASSESSMENT    [place] Sleepiness Scale:  Sitting and reading:   3  Watching TV: "    3  Passenger in a car x 1 hr:  3  Sitting quietly after lunch:  1  Lying down to rest in PM:  3  Sitting, inactive in public:  1  Sitting+ talking to someone:  0  Stopped in traffic:   0  Total    14/24    PROBLEM DESCRIPTION/ Sx on Presentation Interval Hx STATUS   Hx YOJANA   + snoring, + gasping arousals, + witnessed apneas  HST 2021 AHI 22, RDI 38  Never received CPAP Sx persist New to me   Daytime Sx   + sleepiness when inactive   Takes naps, accidental dozing when sedentary   Denies drowsiness when driving  ESS 10/24 on intake (reviewed from 5/31/21) Sx persist New to me   Insomnia   Trouble falling asleep: no issues (10-15mins)  Arousals:         0-2 x nightly  Hard to get back to sleep?: no issues (5-10mins)    Prior pertinent medications:  Current pertinent medications:  Sx persist New to me   Nocturia   x 0-2 per sleep period Sx persist New to me   Other issues:       PLAN      -discussed HST results with patient in detail  -recommended trial of CPAP therapy (gold standard tx for YOJANA), patient is open  -CPAP and supplies ordered  -discussed YOJANA with patient in detail, including possible complications of untreated YOJANA like heart attack/stroke  -advised on strict driving precautions; advised never to drive drowsy     Advised on plan of care. Answered all patient questions. Patient verbalized understanding and voiced agreement with plan of care.     RTC will need follow up 31-90 days after receiving CPAP machine for compliance         The patient was given open opportunity to ask questions and/or express concerns about treatment plan. All questions/concerns were discussed.     Two patient identifiers used prior to evaluation.

## 2024-08-16 ENCOUNTER — TELEPHONE (OUTPATIENT)
Dept: SLEEP MEDICINE | Facility: CLINIC | Age: 44
End: 2024-08-16
Payer: COMMERCIAL

## 2024-09-09 DIAGNOSIS — E78.5 HYPERLIPIDEMIA, UNSPECIFIED HYPERLIPIDEMIA TYPE: ICD-10-CM

## 2024-09-09 RX ORDER — ROSUVASTATIN CALCIUM 40 MG/1
40 TABLET, COATED ORAL NIGHTLY
Qty: 90 TABLET | Refills: 3 | Status: SHIPPED | OUTPATIENT
Start: 2024-09-09

## 2024-10-09 ENCOUNTER — HOSPITAL ENCOUNTER (EMERGENCY)
Facility: HOSPITAL | Age: 44
Discharge: HOME | End: 2024-10-10
Attending: EMERGENCY MEDICINE
Payer: COMMERCIAL

## 2024-10-09 DIAGNOSIS — S69.90XA FINGER INJURY, INITIAL ENCOUNTER: Primary | ICD-10-CM

## 2024-10-09 LAB
ALBUMIN SERPL BCP-MCNC: 4.1 G/DL (ref 3.5–5.2)
ALP SERPL-CCNC: 81 U/L (ref 55–135)
ALT SERPL W/O P-5'-P-CCNC: 41 U/L (ref 10–44)
ANION GAP SERPL CALC-SCNC: 8 MMOL/L (ref 8–16)
AST SERPL-CCNC: 35 U/L (ref 10–40)
BASOPHILS # BLD AUTO: 0.04 K/UL (ref 0–0.2)
BASOPHILS NFR BLD: 0.5 % (ref 0–1.9)
BILIRUB SERPL-MCNC: 0.2 MG/DL (ref 0.1–1)
BUN SERPL-MCNC: 15 MG/DL (ref 6–20)
CALCIUM SERPL-MCNC: 9.6 MG/DL (ref 8.7–10.5)
CHLORIDE SERPL-SCNC: 108 MMOL/L (ref 95–110)
CO2 SERPL-SCNC: 24 MMOL/L (ref 23–29)
CREAT SERPL-MCNC: 1.1 MG/DL (ref 0.5–1.4)
DIFFERENTIAL METHOD BLD: ABNORMAL
EOSINOPHIL # BLD AUTO: 0.2 K/UL (ref 0–0.5)
EOSINOPHIL NFR BLD: 1.9 % (ref 0–8)
ERYTHROCYTE [DISTWIDTH] IN BLOOD BY AUTOMATED COUNT: 13.2 % (ref 11.5–14.5)
EST. GFR  (NO RACE VARIABLE): >60 ML/MIN/1.73 M^2
GLUCOSE SERPL-MCNC: 134 MG/DL (ref 70–110)
HCT VFR BLD AUTO: 38.8 % (ref 40–54)
HGB BLD-MCNC: 13.7 G/DL (ref 14–18)
IMM GRANULOCYTES # BLD AUTO: 0.02 K/UL (ref 0–0.04)
IMM GRANULOCYTES NFR BLD AUTO: 0.2 % (ref 0–0.5)
LYMPHOCYTES # BLD AUTO: 1.9 K/UL (ref 1–4.8)
LYMPHOCYTES NFR BLD: 24 % (ref 18–48)
MCH RBC QN AUTO: 31.5 PG (ref 27–31)
MCHC RBC AUTO-ENTMCNC: 35.3 G/DL (ref 32–36)
MCV RBC AUTO: 89 FL (ref 82–98)
MONOCYTES # BLD AUTO: 0.5 K/UL (ref 0.3–1)
MONOCYTES NFR BLD: 6.7 % (ref 4–15)
NEUTROPHILS # BLD AUTO: 5.4 K/UL (ref 1.8–7.7)
NEUTROPHILS NFR BLD: 66.7 % (ref 38–73)
NRBC BLD-RTO: 0 /100 WBC
PLATELET # BLD AUTO: 226 K/UL (ref 150–450)
PMV BLD AUTO: 10.1 FL (ref 9.2–12.9)
POTASSIUM SERPL-SCNC: 3.9 MMOL/L (ref 3.5–5.1)
PROT SERPL-MCNC: 7.4 G/DL (ref 6–8.4)
RBC # BLD AUTO: 4.35 M/UL (ref 4.6–6.2)
SODIUM SERPL-SCNC: 140 MMOL/L (ref 136–145)
WBC # BLD AUTO: 8.04 K/UL (ref 3.9–12.7)

## 2024-10-09 PROCEDURE — 96375 TX/PRO/DX INJ NEW DRUG ADDON: CPT

## 2024-10-09 PROCEDURE — 90471 IMMUNIZATION ADMIN: CPT | Performed by: EMERGENCY MEDICINE

## 2024-10-09 PROCEDURE — 25000003 PHARM REV CODE 250: Performed by: EMERGENCY MEDICINE

## 2024-10-09 PROCEDURE — 85025 COMPLETE CBC W/AUTO DIFF WBC: CPT | Performed by: EMERGENCY MEDICINE

## 2024-10-09 PROCEDURE — 90715 TDAP VACCINE 7 YRS/> IM: CPT | Performed by: EMERGENCY MEDICINE

## 2024-10-09 PROCEDURE — 80053 COMPREHEN METABOLIC PANEL: CPT | Performed by: EMERGENCY MEDICINE

## 2024-10-09 PROCEDURE — 99284 EMERGENCY DEPT VISIT MOD MDM: CPT | Mod: 25

## 2024-10-09 PROCEDURE — 96365 THER/PROPH/DIAG IV INF INIT: CPT

## 2024-10-09 PROCEDURE — 63600175 PHARM REV CODE 636 W HCPCS: Performed by: EMERGENCY MEDICINE

## 2024-10-09 RX ORDER — MORPHINE SULFATE 4 MG/ML
4 INJECTION, SOLUTION INTRAMUSCULAR; INTRAVENOUS
Status: COMPLETED | OUTPATIENT
Start: 2024-10-09 | End: 2024-10-09

## 2024-10-09 RX ORDER — LIDOCAINE HYDROCHLORIDE 10 MG/ML
10 INJECTION, SOLUTION EPIDURAL; INFILTRATION; INTRACAUDAL; PERINEURAL
Status: COMPLETED | OUTPATIENT
Start: 2024-10-09 | End: 2024-10-09

## 2024-10-09 RX ADMIN — CEFAZOLIN 2 G: 2 INJECTION, POWDER, FOR SOLUTION INTRAMUSCULAR; INTRAVENOUS at 10:10

## 2024-10-09 RX ADMIN — MORPHINE SULFATE 4 MG: 4 INJECTION INTRAVENOUS at 10:10

## 2024-10-09 RX ADMIN — LIDOCAINE HYDROCHLORIDE 100 MG: 10 SOLUTION INTRAVENOUS at 09:10

## 2024-10-09 RX ADMIN — TETANUS TOXOID, REDUCED DIPHTHERIA TOXOID AND ACELLULAR PERTUSSIS VACCINE, ADSORBED 0.5 ML: 5; 2.5; 8; 8; 2.5 SUSPENSION INTRAMUSCULAR at 10:10

## 2024-10-09 NOTE — Clinical Note
"Cody Kendall"Matthew was seen and treated in our emergency department on 10/9/2024.  He may return to work on 10/21/2024.       If you have any questions or concerns, please don't hesitate to call.      Nadeem Blankenship MD"

## 2024-10-10 ENCOUNTER — TELEPHONE (OUTPATIENT)
Dept: ORTHOPEDICS | Facility: CLINIC | Age: 44
End: 2024-10-10
Payer: COMMERCIAL

## 2024-10-10 VITALS
BODY MASS INDEX: 46.23 KG/M2 | SYSTOLIC BLOOD PRESSURE: 169 MMHG | HEIGHT: 66 IN | DIASTOLIC BLOOD PRESSURE: 93 MMHG | TEMPERATURE: 98 F | WEIGHT: 287.69 LBS | OXYGEN SATURATION: 94 % | RESPIRATION RATE: 16 BRPM | HEART RATE: 105 BPM

## 2024-10-10 PROBLEM — S68.623A: Status: ACTIVE | Noted: 2024-10-10

## 2024-10-10 PROCEDURE — 96376 TX/PRO/DX INJ SAME DRUG ADON: CPT

## 2024-10-10 PROCEDURE — 12001 RPR S/N/AX/GEN/TRNK 2.5CM/<: CPT | Mod: 59

## 2024-10-10 PROCEDURE — 25000003 PHARM REV CODE 250: Performed by: EMERGENCY MEDICINE

## 2024-10-10 PROCEDURE — 29130 APPL FINGER SPLINT STATIC: CPT | Mod: F2

## 2024-10-10 PROCEDURE — 63600175 PHARM REV CODE 636 W HCPCS: Performed by: EMERGENCY MEDICINE

## 2024-10-10 RX ORDER — METHOCARBAMOL 750 MG/1
750 TABLET, FILM COATED ORAL
Status: COMPLETED | OUTPATIENT
Start: 2024-10-10 | End: 2024-10-10

## 2024-10-10 RX ORDER — OXYCODONE HYDROCHLORIDE 5 MG/1
10 TABLET ORAL
Status: COMPLETED | OUTPATIENT
Start: 2024-10-10 | End: 2024-10-10

## 2024-10-10 RX ORDER — METHOCARBAMOL 500 MG/1
1000 TABLET, FILM COATED ORAL EVERY 8 HOURS PRN
Qty: 30 TABLET | Refills: 0 | Status: SHIPPED | OUTPATIENT
Start: 2024-10-10 | End: 2024-10-15

## 2024-10-10 RX ORDER — NAPROXEN 500 MG/1
500 TABLET ORAL 2 TIMES DAILY PRN
Qty: 20 TABLET | Refills: 0 | Status: SHIPPED | OUTPATIENT
Start: 2024-10-10

## 2024-10-10 RX ORDER — SULFAMETHOXAZOLE AND TRIMETHOPRIM 800; 160 MG/1; MG/1
1 TABLET ORAL 2 TIMES DAILY
Qty: 20 TABLET | Refills: 0 | Status: SHIPPED | OUTPATIENT
Start: 2024-10-10 | End: 2024-10-20

## 2024-10-10 RX ORDER — MORPHINE SULFATE 2 MG/ML
6 INJECTION, SOLUTION INTRAMUSCULAR; INTRAVENOUS
Status: COMPLETED | OUTPATIENT
Start: 2024-10-10 | End: 2024-10-10

## 2024-10-10 RX ORDER — OXYCODONE HYDROCHLORIDE 10 MG/1
10 TABLET ORAL EVERY 4 HOURS PRN
Qty: 15 TABLET | Refills: 0 | Status: SHIPPED | OUTPATIENT
Start: 2024-10-10 | End: 2024-10-13

## 2024-10-10 RX ADMIN — MORPHINE SULFATE 6 MG: 2 INJECTION, SOLUTION INTRAMUSCULAR; INTRAVENOUS at 12:10

## 2024-10-10 RX ADMIN — METHOCARBAMOL 750 MG: 750 TABLET ORAL at 12:10

## 2024-10-10 RX ADMIN — OXYCODONE HYDROCHLORIDE 10 MG: 5 TABLET ORAL at 12:10

## 2024-10-10 NOTE — DISCHARGE INSTRUCTIONS
We attempted to reattach your finger tip.  Be certain follow up in hand Clinic.  They will contact you for an appointment.  Take naproxen twice a day as needed for pain.  If pain is severe, you may use oxycodone.  Oxycodone is an opiate and will make you drowsy.  You may also use methocarbamol Robaxin as needed for spasms.  This will also make you drowsy. Do not use medication when you are operating heavy machinery, driving, or working because the medication may be sedating. Do not take medication sooner than the frequency as directed.     Be sure to take Bactrim twice a day to prevent an infection.    Return to the ER for any severe pain, severe bleeding, fever, red streaking up your hand, or other concerning symptoms.

## 2024-10-10 NOTE — CONSULTS
[facility] - Emergency Dept  Orthopedics  Consult Note    Patient Name: Cody Castro  MRN: [id number]  Admission Date: 10/9/2024  Hospital Length of Stay: 0 days  Attending Provider: No att. providers found  Primary Care Provider: Abad Patterson MD        Consults  Subjective:     Principal Problem:Partial traumatic amputation of left middle finger through phalanx    Chief Complaint:   Chief Complaint   Patient presents with    Hand Pain     Left middle finger amputation. Occurred just PTA        HPI: Cody Castro is a 43 y.o. male with PMH significant for Afib on xarelto, anxiety, obesity, CAD & MI s/p PCI 2013 and 2018, HTN, CHF, YOJANA, COPD  presenting with left 3rd digit partial amputation. He was washing clothes when his finger became caught in the agitator of the washing machine. Injury happened about 1 hour prior to arrival. The patient does endorse decreased sensation to the tip of the 3rd digit.  Denies any other musculoskeletal pain or injuries. No known history of prior left hand injury or surgery. He is left hand dominant.    They deny IV drug use.   They have a 40 pack year smoking history.    They endorse occasional  alcohol use.   They deny immunosuppressant medications.  They deny chemotherapy.  They deny radiation therapy.       Past Medical History:   Diagnosis Date    Anxiety     Carbon monoxide poisoning     Caused a seizure    Chronic combined systolic and diastolic heart failure 6/22/2018    Chronic systolic congestive heart failure 11/15/2019    COPD exacerbation 3/19/2024    Coronary artery disease     History of heart artery stent 2013    Hypertension     Ischemic cardiomyopathy 3/16/2022    EF 35% improved to 50%    NSTEMI (non-ST elevated myocardial infarction) 2/10/2013    Obesity     YOJANA (obstructive sleep apnea)     Paroxysmal A-fib 2/9/2013       Past Surgical History:   Procedure Laterality Date    CORONARY ANGIOPLASTY      LEFT HEART CATHETERIZATION Right 6/25/2018     Procedure: Left heart cath;  Surgeon: Dieter Vargas MD;  Location: [facility] CATH LAB;  Service: Cardiology;  Laterality: Right;       Review of patient's allergies indicates:  No Known Allergies    No current facility-administered medications for this encounter.     Current Outpatient Medications   Medication Sig    aspirin (ECOTRIN) 81 MG EC tablet Take 81 mg by mouth once daily.    clonazePAM (KLONOPIN) 2 MG Tab Take 1 mg by mouth 2 (two) times daily as needed (for anxiety.).    lisinopriL 10 MG tablet Take 1 tablet (10 mg total) by mouth once daily.    rivaroxaban (XARELTO) 20 mg Tab Take 1 tablet (20 mg total) by mouth daily with dinner or evening meal.    rosuvastatin (CRESTOR) 40 MG Tab Take 1 tablet (40 mg total) by mouth every evening.    albuterol (PROVENTIL/VENTOLIN HFA) 90 mcg/actuation inhaler Inhale 1-2 puffs into the lungs every 4 (four) hours as needed for Wheezing or Shortness of Breath (rescue inhaler). Rescue    ezetimibe (ZETIA) 10 mg tablet Take 1 tablet (10 mg total) by mouth once daily.    HYDROcodone-acetaminophen (NORCO)  mg per tablet Take 1 tablet by mouth every 6 (six) hours as needed (for back pain.).    methocarbamoL (ROBAXIN) 500 MG Tab Take 2 tablets (1,000 mg total) by mouth every 8 (eight) hours as needed (muscle spasms).    metoprolol succinate (TOPROL-XL) 50 MG 24 hr tablet Take 1 tablet (50 mg total) by mouth once daily.    naproxen (NAPROSYN) 500 MG tablet Take 1 tablet (500 mg total) by mouth 2 (two) times daily as needed (pain).    oxyCODONE (ROXICODONE) 10 mg Tab immediate release tablet Take 1 tablet (10 mg total) by mouth every 4 (four) hours as needed for Pain.    sulfamethoxazole-trimethoprim 800-160mg (BACTRIM DS) 800-160 mg Tab Take 1 tablet by mouth 2 (two) times daily. for 10 days    tiotropium bromide (SPIRIVA RESPIMAT) 2.5 mcg/actuation inhaler Inhale 2 puffs into the lungs Daily. Controller     Family History       Problem Relation (Age of Onset)    Heart  "disease Mother, Father          Tobacco Use    Smoking status: Every Day     Current packs/day: 1.25     Average packs/day: 1.3 packs/day for 27.8 years (34.7 ttl pk-yrs)     Types: Cigarettes     Start date: 01/1997    Smokeless tobacco: Never   Substance and Sexual Activity    Alcohol use: Not Currently     Comment: occasional. none in 2 months    Drug use: Yes     Types: Marijuana    Sexual activity: Yes     Partners: Female     ROS  Constitutional: negative for fevers  Eyes: negative visual changes  ENT: negative for hearing loss  Respiratory: negative for dyspnea  Cardiovascular: negative for chest pain  Gastrointestinal: negative for abdominal pain  Genitourinary: negative for dysuria  Neurological: negative for headaches  Behavioral/Psych: negative for hallucinations  Endocrine: negative for temperature intolerance    Objective:     Vital Signs (Most Recent):  Temp: 98.3 °F (36.8 °C) (10/09/24 2039)  Pulse: 105 (10/09/24 2039)  Resp: 16 (10/10/24 0043)  BP: (!) 169/93 (10/09/24 2039)  SpO2: (!) 94 % (10/09/24 2039) Vital Signs (24h Range):  Temp:  [98.3 °F (36.8 °C)] 98.3 °F (36.8 °C)  Pulse:  [105] 105  Resp:  [14-16] 16  SpO2:  [94 %] 94 %  BP: (169)/(93) 169/93     Weight: 130.5 kg (287 lb 11.2 oz)  Height: 5' 6" (167.6 cm)  Body mass index is 46.44 kg/m².    No intake or output data in the 24 hours ending 10/10/24 0953     Ortho/SPM Exam  General:  no acute distress, appears stated age   Neuro: alert and oriented x3  Psych: normal mood  Head: normocephalic, atraumatic.  Eyes: no scleral icterus  Mouth: moist mucous membranes  Cardiovascular: extremities warm and well perfused  Lungs: breathing comfortably, equal chest rise bilat  Skin: clean, dry, intact (any exceptions noted in below musculoskeletal exam)    MSK:  LUE:  - Circumferential laceration just distal 3rd DIP   - Moderate swelling to digit  - Purplish discoloration to distal tip of 3rd digit  - AROM and PROM of the shoulder, elbow, wrist intact " without pain  - Patient able to flex/extend DIP joint of 3rd digit  - Axillary/AIN/PIN/Radial/Median/Ulnar Nerves assessed in isolation without deficit  - Decreased sensation to tip of 3rd digit  - Compartments soft  - Radial artery palpated   - Sluggish cap refill to 3rd digit         Significant Labs: CBC:   Recent Labs   Lab 10/09/24  2147   WBC 8.04   HGB 13.7*   HCT 38.8*        CMP:   Recent Labs   Lab 10/09/24  2147      K 3.9      CO2 24   *   BUN 15   CREATININE 1.1   CALCIUM 9.6   PROT 7.4   ALBUMIN 4.1   BILITOT 0.2   ALKPHOS 81   AST 35   ALT 41   ANIONGAP 8     All pertinent labs within the past 24 hours have been reviewed.    Significant Imaging: I have reviewed and interpreted all pertinent imaging results/findings.  XR L 3rd digit: Soft tissue defect over volar and dorsal aspect of distal phalanx. Distal phalanx is flexed at DIP joint  Assessment/Plan:     * Partial traumatic amputation of left middle finger through phalanx  Cody Castro is a 43 y.o. male with PMH of  Afib on xarelto, anxiety, obesity, CAD & MI s/p PCI 2013 and 2018, HTN, CHF, YOJANA, COPD  presenting with left 3rd digit partial amputation at the level of the DIP joint. Injury happened 1 hour PTA. 2g Ancef and TDAP given in ED. On exam patient has decreased sensation to distal phalanx of 3rd digit. Upon initial exam, distal phalanx with purplish discoloration and sluggish capillary refill. Laceration was irrigation and repair. See procedure note for further details. After closure of lac/reduction of phalanx, discoloration resolved and 3rd digit had brisk capillary refill.     - Multimodal pain control  - NWB LUE  - Leave dressing c/d/I until clinic f/u  - Bactrim BID x 10 days  - follow up outpatient in clinic    Procedure Note: Left 3rd digit distal phalanx laceration Repair  After time out was performed and patient ID, side, and site were verified, the left Middle finger was sterilly prepped in the  standard fashion.  10 mL's of 1% lidocaine were injected for a digital block with a 25-gauge needle. After adequate analgesia was obtained, the wound was irrigated with 2L NS and betadine. The wound was then examined. The DIP joint appeared to be exposed. At this point, the wound was primarily closed using 3-0 nylon sutures. Xeroform was placed over the laceration.  Soft dressings, Alumafoam splint and Ace bandage were applied.  The patient tolerated the procedure well with no complications.  Blood loss was minimal.                  JL Navarro MD  Orthopedics  [facility] - Emergency Dept

## 2024-10-10 NOTE — ED PROVIDER NOTES
Encounter Date: 10/9/2024       History     Chief Complaint   Patient presents with    Hand Pain     Left middle finger amputation. Occurred just PTA     42 yo M with extensive pmhx including pAfib on xarelto, anxiety, obesity, CAD c MI s/p PCI, HTN, CHF, YOJANA, COPD presents with a chief complaint of left middle finger amputation.  Injury occurred 1 hour prior to arrival.  He accidentally got his hand stuck in a washing machine.  No alcohol.  He is left-hand dominant.  No previous injury.  Last ate prior to arrival.  Uncertain of his last tetanus.    The history is provided by the patient.     Review of patient's allergies indicates:  No Known Allergies  Past Medical History:   Diagnosis Date    Anxiety     Carbon monoxide poisoning     Caused a seizure    Chronic combined systolic and diastolic heart failure 6/22/2018    Chronic systolic congestive heart failure 11/15/2019    COPD exacerbation 3/19/2024    Coronary artery disease     History of heart artery stent 2013    Hypertension     Ischemic cardiomyopathy 3/16/2022    EF 35% improved to 50%    NSTEMI (non-ST elevated myocardial infarction) 2/10/2013    Obesity     YOJANA (obstructive sleep apnea)     Paroxysmal A-fib 2/9/2013     Past Surgical History:   Procedure Laterality Date    CORONARY ANGIOPLASTY      LEFT HEART CATHETERIZATION Right 6/25/2018    Procedure: Left heart cath;  Surgeon: Dieter Vargas MD;  Location: [facility] CATH LAB;  Service: Cardiology;  Laterality: Right;     Family History   Problem Relation Name Age of Onset    Heart disease Mother      Heart disease Father       Social History     Tobacco Use    Smoking status: Every Day     Current packs/day: 1.25     Average packs/day: 1.3 packs/day for 27.8 years (34.7 ttl pk-yrs)     Types: Cigarettes     Start date: 01/1997    Smokeless tobacco: Never   Substance Use Topics    Alcohol use: Not Currently     Comment: occasional. none in 2 months    Drug use: Yes     Types: Marijuana     Review of  Systems    Physical Exam     Initial Vitals [10/09/24 2039]   BP Pulse Resp Temp SpO2   (!) 169/93 105 14 98.3 °F (36.8 °C) (!) 94 %      MAP       --         Physical Exam    Nursing note and vitals reviewed.  Constitutional: He appears well-developed and well-nourished. He is not diaphoretic. No distress.   HENT:   Head: Normocephalic.   Cardiovascular:    Tachycardia present.         Pulmonary/Chest: No stridor. No respiratory distress.   Musculoskeletal:      Comments: Left middle finger laceration through distal phalanx.  Proximal to nail fold.  Distal to the DIP joint.  Sensation to slight touch preserved. Distal cap refill sluggish     Neurological: He is alert.   Skin: Skin is warm.   Left 3rd middle finger distal tip dusky with very delayed cap refill   Psychiatric: Thought content normal.                   ED Course   Procedures  Labs Reviewed   CBC W/ AUTO DIFFERENTIAL - Abnormal       Result Value    WBC 8.04      RBC 4.35 (*)     Hemoglobin 13.7 (*)     Hematocrit 38.8 (*)     MCV 89      MCH 31.5 (*)     MCHC 35.3      RDW 13.2      Platelets 226      MPV 10.1      Immature Granulocytes 0.2      Gran # (ANC) 5.4      Immature Grans (Abs) 0.02      Lymph # 1.9      Mono # 0.5      Eos # 0.2      Baso # 0.04      nRBC 0      Gran % 66.7      Lymph % 24.0      Mono % 6.7      Eosinophil % 1.9      Basophil % 0.5      Differential Method Automated     COMPREHENSIVE METABOLIC PANEL - Abnormal    Sodium 140      Potassium 3.9      Chloride 108      CO2 24      Glucose 134 (*)     BUN 15      Creatinine 1.1      Calcium 9.6      Total Protein 7.4      Albumin 4.1      Total Bilirubin 0.2      Alkaline Phosphatase 81      AST 35      ALT 41      eGFR >60.0      Anion Gap 8            Imaging Results              X-Ray Finger 2 or More Views Left (Final result)  Result time 10/09/24 23:26:41      Final result by Mickey Briggs MD (10/09/24 23:26:41)                   Impression:      See above comments.   "Follow-up recommended.      Electronically signed by: Mickey Higginbotham  Date:    10/09/2024  Time:    23:26               Narrative:    EXAMINATION:  XR FINGER 2 OR MORE VIEWS LEFT    CLINICAL HISTORY:  L middle finger tip amputation;    TECHNIQUE:  Three views of the left "middle" finger    COMPARISON:  None    FINDINGS:  Probable soft tissue defect at the dorsum of the distal phalanx of the 3rd digit.  The distal phalanx is hyperflexed at the distal interphalangeal joint and could be associated with dorsal slip/ligamentous injury.  No fractures are detected.    No mass or foreign body is detected.  Metallic ring the 4th digit.                                       Medications   LIDOcaine (PF) 10 mg/ml (1%) injection 100 mg (has no administration in time range)   ceFAZolin 2 g in D5W 50 mL IVPB (MB+) (0 g Intravenous Stopped 10/9/24 2241)   Tdap (BOOSTRIX) vaccine injection 0.5 mL (0.5 mLs Intramuscular Given 10/9/24 2212)   morphine injection 4 mg (4 mg Intravenous Given 10/9/24 2211)   morphine injection 6 mg (6 mg Intravenous Given 10/10/24 0037)   methocarbamoL tablet 750 mg (750 mg Oral Given 10/10/24 0037)   oxyCODONE immediate release tablet 10 mg (10 mg Oral Given 10/10/24 0043)     Medical Decision Making  44 yo M with extensive pmhx including pAfib on xarelto, anxiety, obesity, CAD c MI s/p PCI, HTN, CHF, YOJANA, COPD presents with a chief complaint of left middle finger amputation.      Differential includes, but is not limited to:  Finger tip amputation, open fracture, neurovascular injury    Will administer tetanus booster and Ancef.  Will obtain plain films and labs.  Will administer analgesics. Orthopedics consulted who recommend deferring block to their exam. Will administer analgesics.    Reassessment: CBC with an acute anemia with a hemoglobin of 13.7 down from baseline.  CMP normal.  X-ray as above.  Distal fingertip amputation was nearly circumferential but sutured by Orthopedics and subsequently " placed in splint.  They will arrange follow-up in hand surgery Clinic.  I appreciate their assistance with this consult.  Will discharge on course of Bactrim.  Analgesics, antispasmodics, NSAIDs.  Return precautions.  Patient was comfortable with plan and follow-up.  Work note.    Amount and/or Complexity of Data Reviewed  Labs: ordered.  Radiology: ordered.    Risk  Prescription drug management.                                      Clinical Impression:  Final diagnoses:  [S69.90XA] Finger injury, initial encounter (Primary)          ED Disposition Condition    Discharge Stable          ED Prescriptions       Medication Sig Dispense Start Date End Date Auth. Provider    oxyCODONE (ROXICODONE) 10 mg Tab immediate release tablet Take 1 tablet (10 mg total) by mouth every 4 (four) hours as needed for Pain. 15 tablet 10/10/2024 10/13/2024 Nadeem Blankenship MD    sulfamethoxazole-trimethoprim 800-160mg (BACTRIM DS) 800-160 mg Tab Take 1 tablet by mouth 2 (two) times daily. for 10 days 20 tablet 10/10/2024 10/20/2024 Nadeem Blankenship MD    methocarbamoL (ROBAXIN) 500 MG Tab Take 2 tablets (1,000 mg total) by mouth every 8 (eight) hours as needed (muscle spasms). 30 tablet 10/10/2024 10/15/2024 Nadeem Blankenship MD    naproxen (NAPROSYN) 500 MG tablet Take 1 tablet (500 mg total) by mouth 2 (two) times daily as needed (pain). 20 tablet 10/10/2024 -- Nadeem Blankenship MD          Follow-up Information       Follow up With Specialties Details Why Contact Info    Christine Murray MD Hand Surgery, Orthopedic Surgery Schedule an appointment as soon as possible for a visit   [address] [facility] [facility]  [address]  [facility] [address]  [phone]      [facility] - Emergency Dept Emergency Medicine  As needed, If symptoms worsen [address]  [facility] [address] [phone]             Nadeem Blankenship MD  10/10/24 0051

## 2024-10-10 NOTE — SUBJECTIVE & OBJECTIVE
Past Medical History:   Diagnosis Date    Anxiety     Carbon monoxide poisoning     Caused a seizure    Chronic combined systolic and diastolic heart failure 6/22/2018    Chronic systolic congestive heart failure 11/15/2019    COPD exacerbation 3/19/2024    Coronary artery disease     History of heart artery stent 2013    Hypertension     Ischemic cardiomyopathy 3/16/2022    EF 35% improved to 50%    NSTEMI (non-ST elevated myocardial infarction) 2/10/2013    Obesity     YOJANA (obstructive sleep apnea)     Paroxysmal A-fib 2/9/2013       Past Surgical History:   Procedure Laterality Date    CORONARY ANGIOPLASTY      LEFT HEART CATHETERIZATION Right 6/25/2018    Procedure: Left heart cath;  Surgeon: Dieter Vargas MD;  Location: [facility] CATH LAB;  Service: Cardiology;  Laterality: Right;       Review of patient's allergies indicates:  No Known Allergies    No current facility-administered medications for this encounter.     Current Outpatient Medications   Medication Sig    aspirin (ECOTRIN) 81 MG EC tablet Take 81 mg by mouth once daily.    clonazePAM (KLONOPIN) 2 MG Tab Take 1 mg by mouth 2 (two) times daily as needed (for anxiety.).    lisinopriL 10 MG tablet Take 1 tablet (10 mg total) by mouth once daily.    rivaroxaban (XARELTO) 20 mg Tab Take 1 tablet (20 mg total) by mouth daily with dinner or evening meal.    rosuvastatin (CRESTOR) 40 MG Tab Take 1 tablet (40 mg total) by mouth every evening.    albuterol (PROVENTIL/VENTOLIN HFA) 90 mcg/actuation inhaler Inhale 1-2 puffs into the lungs every 4 (four) hours as needed for Wheezing or Shortness of Breath (rescue inhaler). Rescue    ezetimibe (ZETIA) 10 mg tablet Take 1 tablet (10 mg total) by mouth once daily.    HYDROcodone-acetaminophen (NORCO)  mg per tablet Take 1 tablet by mouth every 6 (six) hours as needed (for back pain.).    methocarbamoL (ROBAXIN) 500 MG Tab Take 2 tablets (1,000 mg total) by mouth every 8 (eight) hours as needed (muscle spasms).  "   metoprolol succinate (TOPROL-XL) 50 MG 24 hr tablet Take 1 tablet (50 mg total) by mouth once daily.    naproxen (NAPROSYN) 500 MG tablet Take 1 tablet (500 mg total) by mouth 2 (two) times daily as needed (pain).    oxyCODONE (ROXICODONE) 10 mg Tab immediate release tablet Take 1 tablet (10 mg total) by mouth every 4 (four) hours as needed for Pain.    sulfamethoxazole-trimethoprim 800-160mg (BACTRIM DS) 800-160 mg Tab Take 1 tablet by mouth 2 (two) times daily. for 10 days    tiotropium bromide (SPIRIVA RESPIMAT) 2.5 mcg/actuation inhaler Inhale 2 puffs into the lungs Daily. Controller     Family History       Problem Relation (Age of Onset)    Heart disease Mother, Father          Tobacco Use    Smoking status: Every Day     Current packs/day: 1.25     Average packs/day: 1.3 packs/day for 27.8 years (34.7 ttl pk-yrs)     Types: Cigarettes     Start date: 01/1997    Smokeless tobacco: Never   Substance and Sexual Activity    Alcohol use: Not Currently     Comment: occasional. none in 2 months    Drug use: Yes     Types: Marijuana    Sexual activity: Yes     Partners: Female     ROS  Constitutional: negative for fevers  Eyes: negative visual changes  ENT: negative for hearing loss  Respiratory: negative for dyspnea  Cardiovascular: negative for chest pain  Gastrointestinal: negative for abdominal pain  Genitourinary: negative for dysuria  Neurological: negative for headaches  Behavioral/Psych: negative for hallucinations  Endocrine: negative for temperature intolerance    Objective:     Vital Signs (Most Recent):  Temp: 98.3 °F (36.8 °C) (10/09/24 2039)  Pulse: 105 (10/09/24 2039)  Resp: 16 (10/10/24 0043)  BP: (!) 169/93 (10/09/24 2039)  SpO2: (!) 94 % (10/09/24 2039) Vital Signs (24h Range):  Temp:  [98.3 °F (36.8 °C)] 98.3 °F (36.8 °C)  Pulse:  [105] 105  Resp:  [14-16] 16  SpO2:  [94 %] 94 %  BP: (169)/(93) 169/93     Weight: 130.5 kg (287 lb 11.2 oz)  Height: 5' 6" (167.6 cm)  Body mass index is 46.44 " kg/m².    No intake or output data in the 24 hours ending 10/10/24 0953     Ortho/SPM Exam  General:  no acute distress, appears stated age   Neuro: alert and oriented x3  Psych: normal mood  Head: normocephalic, atraumatic.  Eyes: no scleral icterus  Mouth: moist mucous membranes  Cardiovascular: extremities warm and well perfused  Lungs: breathing comfortably, equal chest rise bilat  Skin: clean, dry, intact (any exceptions noted in below musculoskeletal exam)    MSK:  LUE:  - Circumferential laceration just distal 3rd DIP   - Moderate swelling to digit  - Purplish discoloration to distal tip of 3rd digit  - AROM and PROM of the shoulder, elbow, wrist intact without pain  - Patient able to flex/extend DIP joint of 3rd digit  - Axillary/AIN/PIN/Radial/Median/Ulnar Nerves assessed in isolation without deficit  - Decreased sensation to tip of 3rd digit  - Compartments soft  - Radial artery palpated   - Sluggish cap refill to 3rd digit         Significant Labs: CBC:   Recent Labs   Lab 10/09/24  2147   WBC 8.04   HGB 13.7*   HCT 38.8*        CMP:   Recent Labs   Lab 10/09/24  2147      K 3.9      CO2 24   *   BUN 15   CREATININE 1.1   CALCIUM 9.6   PROT 7.4   ALBUMIN 4.1   BILITOT 0.2   ALKPHOS 81   AST 35   ALT 41   ANIONGAP 8     All pertinent labs within the past 24 hours have been reviewed.    Significant Imaging: I have reviewed and interpreted all pertinent imaging results/findings.  XR L 3rd digit: Soft tissue defect over volar and dorsal aspect of distal phalanx. Distal phalanx is flexed at DIP joint

## 2024-10-10 NOTE — ED NOTES
"Patient states he pulled back on towel while washing machine spinning, reports " almost took my finger off" On Eliquis and Aspirin   "

## 2024-10-10 NOTE — TELEPHONE ENCOUNTER
Pmhx: smoker, Afib on xarelto, anxiety, obesity, CAD & MI s/p PCI 2013 and 2018, HTN, CHF, YOJANA, COPD     POC: Appt: 10/14/24  LH LISSY: washing clothes, left long finger caught in agitator. DOI: 10/9/24 resulting in decreased sensation tip of LLF and near full amputation of distal phalanx  Intervention: ER 10/9/24 ->I&D and lac repair, placed in alumafoam splint and ace wrap, d/c on oral bactrim BID ending 10/20/24,    Scheduled patient for Monday 10/14/24    Patient communication     Notified patient to stop at [facility] - 1st floor check in [address] [facility] [address] 30 minutes prior to your appointment time on 10/14/24 with Dr. Murray for x-rays.     Made them aware that this is not a scheduled xray appointment and they might be running behind as they are considered a walk-in xray.    Verbalized the Following:  *Please arrive at your informed time above, if you are more than 15 Minutes late to your appointment with Dr. Murray we will have to reschedule your appointment. This will allow you to be seen in a timely manor and be conscious to other patients being seen that same day*           .

## 2024-10-10 NOTE — ASSESSMENT & PLAN NOTE
Cody Castro is a 43 y.o. male with PMH of  Afib on xarelto, anxiety, obesity, CAD & MI s/p PCI 2013 and 2018, HTN, CHF, YOJANA, COPD  presenting with left 3rd digit partial amputation at the level of the DIP joint. Injury happened 1 hour PTA. 2g Ancef and TDAP given in ED. On exam patient has decreased sensation to distal phalanx of 3rd digit. Upon initial exam, distal phalanx with purplish discoloration and sluggish capillary refill. Laceration was irrigation and repair. See procedure note for further details. After closure of lac/reduction of phalanx, discoloration resolved and 3rd digit had brisk capillary refill.     - Multimodal pain control  - NWB LUE  - Leave dressing c/d/I until clinic f/u  - Bactrim BID x 10 days  - follow up outpatient in clinic    Procedure Note: Left 3rd digit distal phalanx laceration Repair  After time out was performed and patient ID, side, and site were verified, the left Middle finger was sterilly prepped in the standard fashion.  10 mL's of 1% lidocaine were injected for a digital block with a 25-gauge needle. After adequate analgesia was obtained, the wound was irrigated with 2L NS and betadine. The wound was then examined. The DIP joint appeared to be exposed. At this point, the wound was primarily closed using 3-0 nylon sutures. Xeroform was placed over the laceration.  Soft dressings, Alumafoam splint and Ace bandage were applied.  The patient tolerated the procedure well with no complications.  Blood loss was minimal.

## 2024-10-10 NOTE — HPI
Cody Castro is a 43 y.o. male with PMH significant for Afib on xarelto, anxiety, obesity, CAD & MI s/p PCI 2013 and 2018, HTN, CHF, YOJANA, COPD  presenting with left 3rd digit partial amputation. He was washing clothes when his finger became caught in the agitator of the washing machine. Injury happened about 1 hour prior to arrival. The patient does endorse decreased sensation to the tip of the 3rd digit.  Denies any other musculoskeletal pain or injuries. No known history of prior left hand injury or surgery. He is left hand dominant.    They deny IV drug use.   They have a 40 pack year smoking history.    They endorse occasional  alcohol use.   They deny immunosuppressant medications.  They deny chemotherapy.  They deny radiation therapy.

## 2024-10-11 ENCOUNTER — ANESTHESIA EVENT (OUTPATIENT)
Dept: SURGERY | Facility: HOSPITAL | Age: 44
End: 2024-10-11
Payer: COMMERCIAL

## 2024-10-11 DIAGNOSIS — S69.92XA INJURY OF NAIL BED OF FINGER OF LEFT HAND, INITIAL ENCOUNTER: ICD-10-CM

## 2024-10-11 DIAGNOSIS — S62.639B OPEN DISPLACED FRACTURE OF DISTAL PHALANX OF FINGER OF LEFT HAND: Primary | ICD-10-CM

## 2024-10-11 NOTE — ANESTHESIA PREPROCEDURE EVALUATION
10/11/2024    Cody Castro is a 43 y.o. male with PMH of Afib on xarelto, anxiety, obesity, CAD & MI s/p PCI 2013 and 2018, HTN, CHF, YOJANA, COPD and tobacco abuse who presented after trauma to L 3rd finger.    Pt presents for Procedure(s):  ORIF, FINGER LONG  IRRIGATION AND DEBRIDEMENT  REPAIR, NAIL BED      Stress Echo: 3/19/24    Left Ventricle: Regional wall motion abnormalities present. There is normal systolic function. Ejection fraction by visual approximation is 55%. Biplane (2D) method of discs ejection fraction is 50%. There is normal diastolic function.    Right Ventricle: Normal right ventricular cavity size. Systolic function is normal.    Left Atrium: Left atrium is mildly dilated.    Pulmonary Artery: The estimated pulmonary artery systolic pressure is 25 mmHg.    IVC/SVC: Normal venous pressure at 3 mmHg.    Stress Protocol: The patient reported no symptoms during the stress test. The test was stopped because the end of the protocol was reached.    Baseline ECG: The Baseline ECG reveals sinus rhythm. The axis is normal. The ST segments are normal.    Stress ECG: There is 0.5 mm of upward-sloping ST segment depression in the inferolateral leads (II, III, aVF, V5 and V6) noted during stress. There are no arrhythmias during stress. There is normal blood pressure response with stress.    ECG Conclusion: The ECG portion of the study is negative for ischemia.    Post-stress Impression: The study is consistent with scar and negative with no echocardiographic evidence of stress induced ischemia.    There is a small area of akinesis in the anteroseptal wall with increased echogenicity, consistent with prior myocardial scar.  This area does not augment, however the remaineder of myocardium does, suggestive no ischemia.      Review of patient's allergies indicates:  No Known Allergies    Wt Readings from Last 1 Encounters:   10/09/24 2039 130.5 kg (287 lb 11.2 oz)       BP Readings from Last 3 Encounters:    10/09/24 (!) 169/93   07/30/24 138/81   07/01/24 (!) 152/80       Patient Active Problem List   Diagnosis    HTN (hypertension)    Mixed hyperlipidemia    Cigarette smoker    Paroxysmal A-fib    Coronary artery disease involving native coronary artery of native heart without angina pectoris    YOJANA (obstructive sleep apnea)    Chronic anticoagulation    Morbid obesity    Episode of transient neurologic symptoms    Dyspnea on exertion    COPD exacerbation    Partial traumatic amputation of left middle finger through phalanx       Past Surgical History:   Procedure Laterality Date    CORONARY ANGIOPLASTY      LEFT HEART CATHETERIZATION Right 6/25/2018    Procedure: Left heart cath;  Surgeon: Dieter Vargas MD;  Location: [facility] CATH LAB;  Service: Cardiology;  Laterality: Right;       Social History     Socioeconomic History    Marital status:    Tobacco Use    Smoking status: Every Day     Current packs/day: 1.25     Average packs/day: 1.3 packs/day for 27.8 years (34.7 ttl pk-yrs)     Types: Cigarettes     Start date: 01/1997    Smokeless tobacco: Never   Substance and Sexual Activity    Alcohol use: Not Currently     Comment: occasional. none in 2 months    Drug use: Yes     Types: Marijuana    Sexual activity: Yes     Partners: Female     Social Drivers of Health     Financial Resource Strain: Low Risk  (3/18/2024)    Overall Financial Resource Strain (CARDIA)     Difficulty of Paying Living Expenses: Not hard at all   Recent Concern: Financial Resource Strain - High Risk (3/18/2024)    Overall Financial Resource Strain (CARDIA)     Difficulty of Paying Living Expenses: Hard   Food Insecurity: No Food Insecurity (3/18/2024)    Hunger Vital Sign     Worried About Running Out of Food in the Last Year: Never true     Ran Out of Food in the Last Year: Never true   Recent Concern: Food Insecurity - Food Insecurity Present (3/18/2024)    Hunger Vital Sign     Worried About Running Out of Food in the Last Year:  "Sometimes true     Ran Out of Food in the Last Year: Often true   Transportation Needs: No Transportation Needs (3/18/2024)    PRAPARE - Transportation     Lack of Transportation (Medical): No     Lack of Transportation (Non-Medical): No   Physical Activity: Inactive (3/18/2024)    Exercise Vital Sign     Days of Exercise per Week: 0 days     Minutes of Exercise per Session: 0 min   Stress: No Stress Concern Present (3/18/2024)    [employer] - Occupational Stress Questionnaire     Feeling of Stress : Not at all   Housing Stability: Low Risk  (3/18/2024)    Housing Stability Vital Sign     Unable to Pay for Housing in the Last Year: No     Number of Places Lived in the Last Year: 1     Unstable Housing in the Last Year: No         Chemistry        Component Value Date/Time     10/09/2024 2147    K 3.9 10/09/2024 2147     10/09/2024 2147    CO2 24 10/09/2024 2147    BUN 15 10/09/2024 2147    CREATININE 1.1 10/09/2024 2147     (H) 10/09/2024 2147        Component Value Date/Time    CALCIUM 9.6 10/09/2024 2147    ALKPHOS 81 10/09/2024 2147    AST 35 10/09/2024 2147    ALT 41 10/09/2024 2147    BILITOT 0.2 10/09/2024 2147    ESTGFRAFRICA >60.0 03/16/2022 1252    EGFRNONAA >60.0 03/16/2022 1252            Lab Results   Component Value Date    WBC 8.04 10/09/2024    HGB 13.7 (L) 10/09/2024    HCT 38.8 (L) 10/09/2024     10/09/2024    CHOL 164 03/18/2024    TRIG 193 (H) 03/18/2024    HDL 32 (L) 03/18/2024    ALT 41 10/09/2024    AST 35 10/09/2024     10/09/2024    K 3.9 10/09/2024     10/09/2024    CREATININE 1.1 10/09/2024    BUN 15 10/09/2024    CO2 24 10/09/2024    TSH 1.526 07/28/2023    INR 1.1 07/28/2023    HGBA1C 5.1 06/22/2018       No results for input(s): "PT", "INR", "PROTIME", "APTT" in the last 72 hours.        Pre-op Assessment    I have reviewed the Patient Summary Reports.     I have reviewed the Nursing Notes.    I have reviewed the " Medications.     Review of Systems  Social:  Smoker, Social Alcohol Use       Hematology/Oncology:                   Hematology Comments: On anticoagulation - xarelto                     Cardiovascular:     Hypertension  Past MI CAD   CABG/stent (s/p PCI 2013 and 2018) Dysrhythmias atrial fibrillation  CHF (EF improved to 50-55% (2022, 2024) from 38% (2019))        Negative stress echo 2024. Scarring noted.    TTE 03/2024  Left Ventricle: Regional wall motion abnormalities present. There is normal systolic function. Ejection fraction by visual approximation is 55%. Biplane (2D) method of discs ejection fraction is 50%. There is normal diastolic function.  ·  Right Ventricle: Normal right ventricular cavity size. Systolic function is normal.  ·  Left Atrium: Left atrium is mildly dilated.  ·  Pulmonary Artery: The estimated pulmonary artery systolic pressure is 25 mmHg.  ·  IVC/SVC: Normal venous pressure at 3 mmHg.  ·  Stress Protocol: The patient reported no symptoms during the stress test. The test was stopped because the end of the protocol was reached.  ·  Baseline ECG: The Baseline ECG reveals sinus rhythm. The axis is normal. The ST segments are normal.  ·  Stress ECG: There is 0.5 mm of upward-sloping ST segment depression in the inferolateral leads (II, III, aVF, V5 and V6) noted during stress. There are no arrhythmias during stress. There is normal blood pressure response with stress.  ·  ECG Conclusion: The ECG portion of the study is negative for ischemia.  ·  Post-stress Impression: The study is consistent with scar and negative with no echocardiographic evidence of stress induced ischemia.  ·  There is a small area of akinesis in the anteroseptal wall with increased echogenicity, consistent with prior myocardial scar.  This area does not augment, however the remaineder of myocardium does, suggestive no ischemia.          Shortness of Breath    Coronary Artery Disease:          Hx of Myocardial  Infarction     Congestive Heart Failure (CHF)                Hypertension     Atrial Fibrillation     Pulmonary:   COPD Asthma mild Shortness of breath  Sleep Apnea   Asthma:   Chronic Obstructive Pulmonary Disease (COPD):           Obstructive Sleep Apnea (YOJANA).           Musculoskeletal:   Musculoskeletal General/Symptoms: (Left middle finger amputation)              Endocrine:        Morbid Obesity / BMI > 40  Psych:  Psychiatric History anxiety                 Physical Exam  General: Well nourished, Cooperative and Alert    Airway:  Mallampati: II   Mouth Opening: Normal  TM Distance: Normal  Tongue: Normal  Neck ROM: Normal ROM    Dental:  Intact        Anesthesia Plan  Type of Anesthesia, risks & benefits discussed:    Anesthesia Type: Gen Natural Airway, Regional  Intra-op Monitoring Plan: Standard ASA Monitors  Post Op Pain Control Plan: multimodal analgesia, peripheral nerve block and IV/PO Opioids PRN  Informed Consent: Informed consent signed with the Patient and all parties understand the risks and agree with anesthesia plan.  All questions answered.   ASA Score: 3  Day of Surgery Review of History & Physical: H&P Update referred to the surgeon/provider.  Anesthesia Plan Notes: 42yo M with PMHx CAD S/P PCI in 2013 and 2018, morbid obesity, CHF with improved EF from 38% in 2019 to stable at 50-55% (2022, 2024), afib on xarelto, smoking hx, HTN, anxiety, who experienced left middle finger amputation now scheduled for repair of same under regional anesthesia.  Eval'ed 10/11 from chart review, pt bmi > 40 with hx yojana, copd/asthma but no home o2 from chart review. YOJANA but plan for regional anesthesia to minimize opiod use. Chf stable >40% x2yrs, no stents within 12 months   -> ok to proceed at [place] under regional anesthesia.    Patient took Xarelto yesterday morning 10/14. Surgeon aware and would like to proceed with procedure.     Ready For Surgery From Anesthesia Perspective.     .

## 2024-10-14 ENCOUNTER — HOSPITAL ENCOUNTER (OUTPATIENT)
Dept: RADIOLOGY | Facility: HOSPITAL | Age: 44
Discharge: HOME | End: 2024-10-14
Attending: ORTHOPAEDIC SURGERY
Payer: COMMERCIAL

## 2024-10-14 ENCOUNTER — OFFICE VISIT (OUTPATIENT)
Dept: ORTHOPEDICS | Facility: CLINIC | Age: 44
End: 2024-10-14
Payer: COMMERCIAL

## 2024-10-14 DIAGNOSIS — S62.639B OPEN DISPLACED FRACTURE OF DISTAL PHALANX OF FINGER OF LEFT HAND: ICD-10-CM

## 2024-10-14 DIAGNOSIS — S69.92XA INJURY OF NAIL BED OF FINGER OF LEFT HAND, INITIAL ENCOUNTER: ICD-10-CM

## 2024-10-14 DIAGNOSIS — S62.639B OPEN DISPLACED FRACTURE OF DISTAL PHALANX OF FINGER OF LEFT HAND: Primary | ICD-10-CM

## 2024-10-14 DIAGNOSIS — S69.90XA FINGER INJURY, INITIAL ENCOUNTER: ICD-10-CM

## 2024-10-14 PROCEDURE — 99205 OFFICE O/P NEW HI 60 MIN: CPT | Mod: 57,S$GLB,, | Performed by: ORTHOPAEDIC SURGERY

## 2024-10-14 PROCEDURE — 73140 X-RAY EXAM OF FINGER(S): CPT | Mod: 26,LT,, | Performed by: RADIOLOGY

## 2024-10-14 PROCEDURE — 73140 X-RAY EXAM OF FINGER(S): CPT | Mod: TC,LT

## 2024-10-14 PROCEDURE — 99999 PR PBB SHADOW E&M-EST. PATIENT-LVL IV: CPT | Mod: PBBFAC,,, | Performed by: ORTHOPAEDIC SURGERY

## 2024-10-14 PROCEDURE — 4010F ACE/ARB THERAPY RXD/TAKEN: CPT | Mod: CPTII,S$GLB,, | Performed by: ORTHOPAEDIC SURGERY

## 2024-10-14 RX ORDER — CEFAZOLIN SODIUM 2 G/50ML
2 SOLUTION INTRAVENOUS
Status: CANCELLED | OUTPATIENT
Start: 2024-10-14

## 2024-10-14 RX ORDER — MUPIROCIN 20 MG/G
OINTMENT TOPICAL
Status: CANCELLED | OUTPATIENT
Start: 2024-10-14

## 2024-10-14 RX ORDER — ONDANSETRON HYDROCHLORIDE 8 MG/1
8 TABLET, FILM COATED ORAL EVERY 8 HOURS PRN
Qty: 30 TABLET | Refills: 0 | Status: SHIPPED | OUTPATIENT
Start: 2024-10-14

## 2024-10-14 RX ORDER — DOCUSATE SODIUM 100 MG/1
100 CAPSULE, LIQUID FILLED ORAL 2 TIMES DAILY
Qty: 30 CAPSULE | Refills: 1 | Status: SHIPPED | OUTPATIENT
Start: 2024-10-14

## 2024-10-14 RX ORDER — OXYCODONE AND ACETAMINOPHEN 7.5; 325 MG/1; MG/1
1 TABLET ORAL EVERY 6 HOURS PRN
Qty: 30 TABLET | Refills: 0 | Status: SHIPPED | OUTPATIENT
Start: 2024-10-14

## 2024-10-14 NOTE — PATIENT INSTRUCTIONS
[facility]  HAND CLINIC SURGERY INSTRUCTIONS  Christine Murray MD  Date of Surgery: 11/5/24    Location of Surgery:      [facility], [facility] [place] [address]    PRE-OPERATIVE INSTRUCTIONS:    Dr. Murray's clinic staff will call you THE DAY BEFORE SURGERY with your arrival time. You will be notified in the afternoon between 3:00p-5:00pm. We will attempt to provide your arrival time earlier and will call you if this is at all possible.     DO NOT eat or drink after midnight, this includes gum/hard candy, coffee.   You may drink gatorade and water only up to 2 hours prior to arrival, NOTHING ELSE.    You ARE NOT to drive or take an Uber/Lyft/taxi home from surgery. Please arrange a friend/family member to accompany you at the surgery center and drive you home.  Transportation: Spouse [facility] PACU Visitor Policy:    Each patient will be allowed 2 visitors with 1 visitor at a time. Only 1 swap out allowed.   Pediatric patients: Both parents are allowed if present.   Post-Op: If there is more than 1 visitor, the person that is the main caregiver will need to be available for d/c instructions should visit 2nd, and stay with the patient until d/c.  All visitors must be accompanied in and out with an employee.    PRE-OPERATIVE MEDICATION INSTRUCTIONS:    If you are diabetic, DO NOT take any diabetic medication the night before surgery or morning of surgery. If you are type I diabetic on an insulin pump, please bring your monitor the morning of surgery.   NA    MUST HOLD SEMAGLUTIDE MEDICATIONS 7 DAYS PRIOR TO SURGERY, examples: Mounjaro, Ozempic, Trulicity.   NA    If you have high blood pressure please take your medication in the morning like normal with a SIP OF WATER; with the exception of any Angiotensin receptor blocker (end in sartan) and ACE inhibitors (end in pril).  metoprolol succinate (TOPROL-XL) 50 MG 24 hr tablet dose as directed AM of sx  rosuvastatin  (CRESTOR) 40 MG Tab dose as directed AM of sx  ezetimibe (ZETIA) 10 mg tablet dose as directed AM of sx  lisinopriL 10 MG table HOLD am of sx      If you are taking blood thinners (Aspirin, Eliquis, Lovenox, Coumadin, etc), our office will contact the prescribing physician for guidance on when you are to stop/re-start your blood thinner medication.   rivaroxaban (XARELTO) 20 mg Tab Hold 48hrs prior to surgery, Resume POD1  aspirin (ECOTRIN) 81 MG EC tablet Hold AM of surgery, resume POD1    Other medications to dose with a SIP OF WATER AM of surgery:   HYDROcodone-acetaminophen (NORCO)  mg per tablet - dose prn AM of surgery    Other Important Medication Instructions:   sulfamethoxazole-trimethoprim 800-160mg (BACTRIM DS) 800-160 mg Tab Do not dose AM of surgery      Please HOLD Vitamins 5 days prior to surgery or sooner, CONTINUE Vitamin D up until the AM of your surgery.    Avoid anti-inflammatory medications 5 days before your surgery. This includes Aleve/Naproxen, Celebrex, Meloxicam/Mobic, Voltaren/Diclofenac.   naproxen (NAPROSYN) 500 MG tablet Hold AM of surgery  You may dose ibuprofen/Advil/Motrin up until the day before your surgery.  You may take extra strength Tylenol/Acetaminophen.    HOLD ALL OTHER MEDICATIONS AM OF SURGERY THAT ARE NOT DISCUSSED ABOVE        POST-OPERATIVE MEDICATION INSTRUCTIONS:    Your post-operative pain medication will be DELIVERED BEDSIDE to you at the surgery center by the [facility]. You DO NOT need to pick this medication up from the [facility].     TAKE post-operative pain medication as directed.   You may also take over-the-counter extra strength Tylenol/acetaminophen as directed on the bottle for breakthrough pain between dosed of prescribed pain medication.   Please note, some pain medications contain Tylenol/acetaminophen.   DO NOT exceed 3000mg of Tylenol/acetaminophen in 1 day.  You may also use an over-the-counter anti-inflammatory medication also  known as an NSAID,  (Aleve/Naproxen) as directed on the bottle for breakthrough pain between doses of prescribed pain medication.  DO NOT take NSAIDs if you have been previously instructed not to by a physician.     POST-OPERATIVE INSTRUCTIONS:    DO NOT let your operative area become wet, Your dressings/bandages/splints must remain dry.   Recommend waterproof arm cast cover to be worn when showering and bathing and can be purchased on Amazon. Garbage bags, plastic shopping bags, or umbrella bags can also be used instead.    DO NOT remove any post-operative dressings/bandages/splints until you are seen by Dr. Murray or Occupational Therapy   These dressings/bandages/splints are in place to keep the operative site clean, dry, and in optimal healing position     ELEVATE your hand/arm above the level of your heart as much as possible to decrease post-operative swelling for first 48 hours.  Swelling after surgery is TO BE EXPECTED.      ICE the operative site following surgery for 20-30 minutes, 4-5 times per day.  Be sure to have a towel between your dressings/bandages/splint to keep from getting wet.    MOVE the parts of your hand/arm that are not immobilized in a sling or splint.   Make a gentle fist with your fingers, bend/straighten your elbow, etc.   DO NOT attempt any strengthening exercises (hand putty, exercise balls, etc) on your operative side as this can increase swelling.     *CALL the [facility] at [phone]*  If at any time following surgery your dressings/bandages/splints: get wet, come loose, feels tight, feels uncomfortable.  Or if you are concerned about possible infection, worsening pain, worsening swelling or have any other concerns regarding your surgery.   Signs of infection:  fever (over 100.3), chills, body aches, increased warmth, redness, significant increase in swelling & pain at surgical site.     OUTPATIENT FOLLOW UP:  YOU WILL BE SEEN FIRST BY OCCUPATIONAL THERAPY 5-7 DAYS AFTER  YOUR SURGERY. *Your splint / soft dressing will be removed as well as your sutures if applicable.  YOU WILL THEN BE SEEN BY DR. MURPHY IN CLINIC 2 weeks AFTER SURGERY    FMLA or Disability paperwork can be sent to [facility]  *Only needed if you are going to be out of work 2 weeks or more*  (P) [phone] (F) [phone] or email [email or web address]

## 2024-10-14 NOTE — H&P (VIEW-ONLY)
[facility]  History & Physical  Orthopedics    SUBJECTIVE:      Chief Complaint:   Chief Complaint   Patient presents with    Left Hand - Injury       Referring Provider: JL Navarro MD     History of Present Illness    Patient is a 43 y.o. left hand dominant male who presents today with complaints of left long finger pain s/p injury 10/9/24.    Patient was washing clothes when his left long finger caught in the agitator on 10/9/24 resulting in near full amputation of distal phalanx. He went to the ER 10/9/24 and ortho resident performed an I&D and lac repair, placed in an alumafoam splint and ace wrap. He was d/c on oral bactrim BID ending 10/20/24 as well as naproxen and oxycodone 10mg.     He reports today decreased sensation tip of LLF.    Vitals:    10/14/24 1015   PainSc:   5   PainLoc: Finger       The patient denies any fevers, chills, N/V, D/C and presents for evaluation.    Past Medical History:   Diagnosis Date    Anxiety     Carbon monoxide poisoning     Caused a seizure    Chronic combined systolic and diastolic heart failure 6/22/2018    Chronic systolic congestive heart failure 11/15/2019    COPD exacerbation 3/19/2024    Coronary artery disease     History of heart artery stent 2013    Hypertension     Ischemic cardiomyopathy 3/16/2022    EF 35% improved to 50%    NSTEMI (non-ST elevated myocardial infarction) 2/10/2013    Obesity     YOJANA (obstructive sleep apnea)     Paroxysmal A-fib 2/9/2013     Past Surgical History:   Procedure Laterality Date    CORONARY ANGIOPLASTY      LEFT HEART CATHETERIZATION Right 6/25/2018    Procedure: Left heart cath;  Surgeon: Dieter Vargas MD;  Location: [facility] CATH LAB;  Service: Cardiology;  Laterality: Right;     Review of patient's allergies indicates:  No Known Allergies  Social History     Social History Narrative    Not on file     Family History   Problem Relation Name Age of Onset    Heart disease Mother      Heart disease  Father         No current facility-administered medications for this visit.  No current outpatient medications on file.    Facility-Administered Medications Ordered in Other Visits:     acetaminophen tablet 1,000 mg, 1,000 mg, Oral, Once Pre-Op, Casimiro Mcfadden PA-C    ceFAZolin 2 g in 0.9% NaCl 50 mL IVPB (MB+), 2 g, Intravenous, On Call Procedure, Christine Murray MD    celecoxib capsule 400 mg, 400 mg, Oral, Once Pre-Op, Casimiro Mcfadden PA-C    mupirocin 2 % ointment, , Nasal, On Call Procedure, Christine Murray MD ROS    Review of Systems:  Constitutional: no fever or chills  Eyes: no visual changes  ENT: no nasal congestion or sore throat  Respiratory: no cough or shortness of breath  Cardiovascular: no chest pain  Gastrointestinal: no nausea or vomiting, tolerating diet  Musculoskeletal: pain, soreness, numbness/tingling, decreased ROM, and wound    OBJECTIVE:      Vital Signs (Most Recent):  There were no vitals filed for this visit.  There is no height or weight on file to calculate BMI.    Physical Exam    Physical Exam:  Constitutional: The patient appears well-developed and well-nourished. No distress.   Head: Normocephalic and atraumatic.   Nose: Nose normal.   Eyes: Conjunctivae and EOM are normal.   Neck: No tracheal deviation present.   Cardiovascular: Normal rate and intact distal pulses.    Pulmonary/Chest: Effort normal. No respiratory distress.   Abdominal: There is no guarding.   Lymphatic: Negative for adenopathy   Neurological: The patient is alert.   Psychiatric: The patient has a normal mood and affect.     Bilateral Hand/Wrist Examination:  Observation/Inspection:  Swelling  none    Deformity  none  Discoloration  none     Scars   none    Atrophy  none    HAND/WRIST EXAMINATION:  LUE:   Left long finger sutures intact, +swelling to left long finger, discoloration to distal aspect of left long finger, able to actively flex DIP joint of left long finger, SILT, otherwise ROM  hand/wrist/elbow full  Physical Exam       Neurovascular Exam:  Digits WWP, brisk CR < 3s throughout  NVI motor/LTS to M/R/U nerves, radial pulse 2+  2+ biceps and brachioradialis reflexes    Diagnostic studies and other clinical records review:  Results       X-rays AP, lateral and oblique left long finger taken today are independently reviewed by me and shows distal phalanx fracture dorsal base    ASSESSMENT/PLAN:    43 y.o. yo male with   Encounter Diagnoses   Name Primary?    Open displaced fracture of distal phalanx of finger of left hand Yes    Injury of nail bed of finger of left hand, initial encounter     Finger injury, initial encounter       Assessment & Plan    Plan: The patient and I had a thorough discussion today. We discussed the working diagnosis as well as several other potential alternative diagnoses. Treatment options were discussed, both conservative and surgical. Conservative treatment options would include things such as activity modifications, workplace modifications, a period of rest, oral vs topical OTC and prescription anti-inflammatory medications, occupational therapy, splinting/bracing, immobilization, corticosteroid injections, and others. Surgical options were discussed as well.     At this time, the patient has exhausted conservative measures and would like to proceed with surgery. Surgery would include left long finger I&D, left long finger ORIF, extensor tendon repair and left long finger nailbed repair. Consent signed today in clinic. Light use of the hand will be indicated for the first 4-6 weeks     We have discussed risks, benefits and alternatives of hand surgery which include but are not limited to blood clots in the legs that can travel to the lungs (pulmonary embolism). Pulmonary embolism can cause shortness of breath, chest pain, and even shock. Other risks include urinary tract infection, nausea and vomiting (usually related to pain medication), chronic pain, bleeding,  nerve damage, blood vessel injury, scarring and infection of the hand which can require re-operation. Furthermore, the risks of anesthesia include potential heart, lung, kidney, and liver damage.  Informed consent was obtained. He understands and would like to proceed with surgery in the near future.    The patient's pathophysiology was explained in detail with reference to x-rays, models, other visual aids as appropriate.  Treatment options were discussed in detail.  Questions were invited and answered to the patient's satisfaction. I reviewed Primary care , and other specialty's notes to better coordinate patient's care.          Christine Murray MD    Please be aware that this note has been generated with the assistance of [employer] voice-to-text.  Please excuse any spelling or grammatical errors.    This note was generated with the assistance of ambient listening technology. Verbal consent was obtained by the patient and accompanying visitor(s) for the recording of patient appointment to facilitate this note. I attest to having reviewed and edited the generated note for accuracy, though some syntax or spelling errors may persist. Please contact the author of this note for any clarification.

## 2024-10-14 NOTE — PROGRESS NOTES
[facility]  History & Physical  Orthopedics    SUBJECTIVE:      Chief Complaint:   Chief Complaint   Patient presents with    Left Hand - Injury       Referring Provider: JL Navarro MD     History of Present Illness    Patient is a 43 y.o. left hand dominant male who presents today with complaints of left long finger pain s/p injury 10/9/24.    Patient was washing clothes when his left long finger caught in the agitator on 10/9/24 resulting in near full amputation of distal phalanx. He went to the ER 10/9/24 and ortho resident performed an I&D and lac repair, placed in an alumafoam splint and ace wrap. He was d/c on oral bactrim BID ending 10/20/24 as well as naproxen and oxycodone 10mg.     He reports today decreased sensation tip of LLF.    Vitals:    10/14/24 1015   PainSc:   5   PainLoc: Finger       The patient denies any fevers, chills, N/V, D/C and presents for evaluation.    Past Medical History:   Diagnosis Date    Anxiety     Carbon monoxide poisoning     Caused a seizure    Chronic combined systolic and diastolic heart failure 6/22/2018    Chronic systolic congestive heart failure 11/15/2019    COPD exacerbation 3/19/2024    Coronary artery disease     History of heart artery stent 2013    Hypertension     Ischemic cardiomyopathy 3/16/2022    EF 35% improved to 50%    NSTEMI (non-ST elevated myocardial infarction) 2/10/2013    Obesity     YOJANA (obstructive sleep apnea)     Paroxysmal A-fib 2/9/2013     Past Surgical History:   Procedure Laterality Date    CORONARY ANGIOPLASTY      LEFT HEART CATHETERIZATION Right 6/25/2018    Procedure: Left heart cath;  Surgeon: Dieter Vargas MD;  Location: [facility] CATH LAB;  Service: Cardiology;  Laterality: Right;     Review of patient's allergies indicates:  No Known Allergies  Social History     Social History Narrative    Not on file     Family History   Problem Relation Name Age of Onset    Heart disease Mother      Heart disease  Father         No current facility-administered medications for this visit.  No current outpatient medications on file.    Facility-Administered Medications Ordered in Other Visits:     acetaminophen tablet 1,000 mg, 1,000 mg, Oral, Once Pre-Op, Casimiro Mcfadden PA-C    ceFAZolin 2 g in 0.9% NaCl 50 mL IVPB (MB+), 2 g, Intravenous, On Call Procedure, Christine Murray MD    celecoxib capsule 400 mg, 400 mg, Oral, Once Pre-Op, Casimiro Mcfadden PA-C    mupirocin 2 % ointment, , Nasal, On Call Procedure, Christine Murray MD ROS    Review of Systems:  Constitutional: no fever or chills  Eyes: no visual changes  ENT: no nasal congestion or sore throat  Respiratory: no cough or shortness of breath  Cardiovascular: no chest pain  Gastrointestinal: no nausea or vomiting, tolerating diet  Musculoskeletal: pain, soreness, numbness/tingling, decreased ROM, and wound    OBJECTIVE:      Vital Signs (Most Recent):  There were no vitals filed for this visit.  There is no height or weight on file to calculate BMI.    Physical Exam    Physical Exam:  Constitutional: The patient appears well-developed and well-nourished. No distress.   Head: Normocephalic and atraumatic.   Nose: Nose normal.   Eyes: Conjunctivae and EOM are normal.   Neck: No tracheal deviation present.   Cardiovascular: Normal rate and intact distal pulses.    Pulmonary/Chest: Effort normal. No respiratory distress.   Abdominal: There is no guarding.   Lymphatic: Negative for adenopathy   Neurological: The patient is alert.   Psychiatric: The patient has a normal mood and affect.     Bilateral Hand/Wrist Examination:  Observation/Inspection:  Swelling  none    Deformity  none  Discoloration  none     Scars   none    Atrophy  none    HAND/WRIST EXAMINATION:  LUE:   Left long finger sutures intact, +swelling to left long finger, discoloration to distal aspect of left long finger, able to actively flex DIP joint of left long finger, SILT, otherwise ROM  hand/wrist/elbow full  Physical Exam       Neurovascular Exam:  Digits WWP, brisk CR < 3s throughout  NVI motor/LTS to M/R/U nerves, radial pulse 2+  2+ biceps and brachioradialis reflexes    Diagnostic studies and other clinical records review:  Results       X-rays AP, lateral and oblique left long finger taken today are independently reviewed by me and shows distal phalanx fracture dorsal base    ASSESSMENT/PLAN:    43 y.o. yo male with   Encounter Diagnoses   Name Primary?    Open displaced fracture of distal phalanx of finger of left hand Yes    Injury of nail bed of finger of left hand, initial encounter     Finger injury, initial encounter       Assessment & Plan    Plan: The patient and I had a thorough discussion today. We discussed the working diagnosis as well as several other potential alternative diagnoses. Treatment options were discussed, both conservative and surgical. Conservative treatment options would include things such as activity modifications, workplace modifications, a period of rest, oral vs topical OTC and prescription anti-inflammatory medications, occupational therapy, splinting/bracing, immobilization, corticosteroid injections, and others. Surgical options were discussed as well.     At this time, the patient has exhausted conservative measures and would like to proceed with surgery. Surgery would include left long finger I&D, left long finger ORIF, extensor tendon repair and left long finger nailbed repair. Consent signed today in clinic. Light use of the hand will be indicated for the first 4-6 weeks     We have discussed risks, benefits and alternatives of hand surgery which include but are not limited to blood clots in the legs that can travel to the lungs (pulmonary embolism). Pulmonary embolism can cause shortness of breath, chest pain, and even shock. Other risks include urinary tract infection, nausea and vomiting (usually related to pain medication), chronic pain, bleeding,  nerve damage, blood vessel injury, scarring and infection of the hand which can require re-operation. Furthermore, the risks of anesthesia include potential heart, lung, kidney, and liver damage.  Informed consent was obtained. He understands and would like to proceed with surgery in the near future.    The patient's pathophysiology was explained in detail with reference to x-rays, models, other visual aids as appropriate.  Treatment options were discussed in detail.  Questions were invited and answered to the patient's satisfaction. I reviewed Primary care , and other specialty's notes to better coordinate patient's care.          Christine Murray MD    Please be aware that this note has been generated with the assistance of [employer] voice-to-text.  Please excuse any spelling or grammatical errors.    This note was generated with the assistance of ambient listening technology. Verbal consent was obtained by the patient and accompanying visitor(s) for the recording of patient appointment to facilitate this note. I attest to having reviewed and edited the generated note for accuracy, though some syntax or spelling errors may persist. Please contact the author of this note for any clarification.

## 2024-10-15 ENCOUNTER — HOSPITAL ENCOUNTER (OUTPATIENT)
Facility: HOSPITAL | Age: 44
Discharge: HOME | End: 2024-10-15
Attending: ORTHOPAEDIC SURGERY | Admitting: ORTHOPAEDIC SURGERY
Payer: COMMERCIAL

## 2024-10-15 ENCOUNTER — ANESTHESIA (OUTPATIENT)
Dept: SURGERY | Facility: HOSPITAL | Age: 44
End: 2024-10-15
Payer: COMMERCIAL

## 2024-10-15 VITALS
WEIGHT: 287 LBS | BODY MASS INDEX: 46.12 KG/M2 | SYSTOLIC BLOOD PRESSURE: 136 MMHG | TEMPERATURE: 98 F | RESPIRATION RATE: 19 BRPM | OXYGEN SATURATION: 94 % | HEIGHT: 66 IN | HEART RATE: 82 BPM | DIASTOLIC BLOOD PRESSURE: 72 MMHG

## 2024-10-15 DIAGNOSIS — S69.92XA INJURY OF NAIL BED OF FINGER OF LEFT HAND, INITIAL ENCOUNTER: ICD-10-CM

## 2024-10-15 DIAGNOSIS — S62.639B OPEN DISPLACED FRACTURE OF DISTAL PHALANX OF FINGER OF LEFT HAND: ICD-10-CM

## 2024-10-15 LAB
GRAM STN SPEC: NORMAL
GRAM STN SPEC: NORMAL

## 2024-10-15 PROCEDURE — 99900035 HC TECH TIME PER 15 MIN (STAT)

## 2024-10-15 PROCEDURE — 87102 FUNGUS ISOLATION CULTURE: CPT | Performed by: ORTHOPAEDIC SURGERY

## 2024-10-15 PROCEDURE — 25000003 PHARM REV CODE 250: Performed by: ORTHOPAEDIC SURGERY

## 2024-10-15 PROCEDURE — 25000003 PHARM REV CODE 250: Performed by: STUDENT IN AN ORGANIZED HEALTH CARE EDUCATION/TRAINING PROGRAM

## 2024-10-15 PROCEDURE — D9220A PRA ANESTHESIA: Mod: ANES,,, | Performed by: STUDENT IN AN ORGANIZED HEALTH CARE EDUCATION/TRAINING PROGRAM

## 2024-10-15 PROCEDURE — 64415 NJX AA&/STRD BRCH PLXS IMG: CPT | Performed by: STUDENT IN AN ORGANIZED HEALTH CARE EDUCATION/TRAINING PROGRAM

## 2024-10-15 PROCEDURE — 36000708 HC OR TIME LEV III 1ST 15 MIN: Performed by: ORTHOPAEDIC SURGERY

## 2024-10-15 PROCEDURE — 87070 CULTURE OTHR SPECIMN AEROBIC: CPT | Performed by: ORTHOPAEDIC SURGERY

## 2024-10-15 PROCEDURE — 94799 UNLISTED PULMONARY SVC/PX: CPT

## 2024-10-15 PROCEDURE — 25000003 PHARM REV CODE 250: Performed by: NURSE ANESTHETIST, CERTIFIED REGISTERED

## 2024-10-15 PROCEDURE — D9220A PRA ANESTHESIA: Mod: CRNA,,, | Performed by: NURSE ANESTHETIST, CERTIFIED REGISTERED

## 2024-10-15 PROCEDURE — 11012 DEB SKIN BONE AT FX SITE: CPT | Mod: 51,,, | Performed by: ORTHOPAEDIC SURGERY

## 2024-10-15 PROCEDURE — 87075 CULTR BACTERIA EXCEPT BLOOD: CPT | Performed by: ORTHOPAEDIC SURGERY

## 2024-10-15 PROCEDURE — 71000015 HC POSTOP RECOV 1ST HR: Performed by: ORTHOPAEDIC SURGERY

## 2024-10-15 PROCEDURE — 26433 REPAIR FINGER TENDON: CPT | Mod: 51,F2,, | Performed by: ORTHOPAEDIC SURGERY

## 2024-10-15 PROCEDURE — 87206 SMEAR FLUORESCENT/ACID STAI: CPT | Performed by: ORTHOPAEDIC SURGERY

## 2024-10-15 PROCEDURE — 37000008 HC ANESTHESIA 1ST 15 MINUTES: Performed by: ORTHOPAEDIC SURGERY

## 2024-10-15 PROCEDURE — 11760 REPAIR OF NAIL BED: CPT | Mod: 51,F2,, | Performed by: ORTHOPAEDIC SURGERY

## 2024-10-15 PROCEDURE — 63600175 PHARM REV CODE 636 W HCPCS: Performed by: STUDENT IN AN ORGANIZED HEALTH CARE EDUCATION/TRAINING PROGRAM

## 2024-10-15 PROCEDURE — 37000009 HC ANESTHESIA EA ADD 15 MINS: Performed by: ORTHOPAEDIC SURGERY

## 2024-10-15 PROCEDURE — 71000033 HC RECOVERY, INTIAL HOUR: Performed by: ORTHOPAEDIC SURGERY

## 2024-10-15 PROCEDURE — 94761 N-INVAS EAR/PLS OXIMETRY MLT: CPT

## 2024-10-15 PROCEDURE — 36000709 HC OR TIME LEV III EA ADD 15 MIN: Performed by: ORTHOPAEDIC SURGERY

## 2024-10-15 PROCEDURE — 63600175 PHARM REV CODE 636 W HCPCS: Performed by: NURSE ANESTHETIST, CERTIFIED REGISTERED

## 2024-10-15 PROCEDURE — 26765 TREAT FINGER FRACTURE EACH: CPT | Mod: F2,,, | Performed by: ORTHOPAEDIC SURGERY

## 2024-10-15 PROCEDURE — C1713 ANCHOR/SCREW BN/BN,TIS/BN: HCPCS | Performed by: ORTHOPAEDIC SURGERY

## 2024-10-15 PROCEDURE — 87205 SMEAR GRAM STAIN: CPT | Performed by: ORTHOPAEDIC SURGERY

## 2024-10-15 PROCEDURE — 87116 MYCOBACTERIA CULTURE: CPT | Performed by: ORTHOPAEDIC SURGERY

## 2024-10-15 DEVICE — .045" X 6" ST GUIDE WIRE
Type: IMPLANTABLE DEVICE | Site: FINGER | Status: FUNCTIONAL
Brand: ACUMED

## 2024-10-15 RX ORDER — FENTANYL CITRATE 50 UG/ML
25 INJECTION, SOLUTION INTRAMUSCULAR; INTRAVENOUS
Status: DISCONTINUED | OUTPATIENT
Start: 2024-10-15 | End: 2024-10-15 | Stop reason: HOSPADM

## 2024-10-15 RX ORDER — PROPOFOL 10 MG/ML
VIAL (ML) INTRAVENOUS CONTINUOUS PRN
Status: DISCONTINUED | OUTPATIENT
Start: 2024-10-15 | End: 2024-10-15

## 2024-10-15 RX ORDER — ONDANSETRON HYDROCHLORIDE 2 MG/ML
INJECTION, SOLUTION INTRAVENOUS
Status: DISCONTINUED | OUTPATIENT
Start: 2024-10-15 | End: 2024-10-15

## 2024-10-15 RX ORDER — CEFAZOLIN SODIUM 1 G/3ML
INJECTION, POWDER, FOR SOLUTION INTRAMUSCULAR; INTRAVENOUS
Status: DISCONTINUED | OUTPATIENT
Start: 2024-10-15 | End: 2024-10-15

## 2024-10-15 RX ORDER — SODIUM CHLORIDE 0.9 % (FLUSH) 0.9 %
10 SYRINGE (ML) INJECTION
Status: DISCONTINUED | OUTPATIENT
Start: 2024-10-15 | End: 2024-10-15 | Stop reason: HOSPADM

## 2024-10-15 RX ORDER — LIDOCAINE HYDROCHLORIDE 10 MG/ML
INJECTION, SOLUTION INTRAVENOUS
Status: DISCONTINUED | OUTPATIENT
Start: 2024-10-15 | End: 2024-10-15

## 2024-10-15 RX ORDER — MIDAZOLAM HYDROCHLORIDE 1 MG/ML
0.5 INJECTION, SOLUTION INTRAMUSCULAR; INTRAVENOUS
Status: DISCONTINUED | OUTPATIENT
Start: 2024-10-15 | End: 2024-10-15 | Stop reason: HOSPADM

## 2024-10-15 RX ORDER — MUPIROCIN 20 MG/G
OINTMENT TOPICAL 2 TIMES DAILY
Status: DISCONTINUED | OUTPATIENT
Start: 2024-10-15 | End: 2024-10-15 | Stop reason: HOSPADM

## 2024-10-15 RX ORDER — MIDAZOLAM HYDROCHLORIDE 1 MG/ML
INJECTION INTRAMUSCULAR; INTRAVENOUS
Status: DISCONTINUED | OUTPATIENT
Start: 2024-10-15 | End: 2024-10-15

## 2024-10-15 RX ORDER — DEXMEDETOMIDINE HYDROCHLORIDE 100 UG/ML
INJECTION, SOLUTION INTRAVENOUS
Status: DISCONTINUED | OUTPATIENT
Start: 2024-10-15 | End: 2024-10-15

## 2024-10-15 RX ORDER — OXYCODONE HYDROCHLORIDE 5 MG/1
10 TABLET ORAL EVERY 4 HOURS PRN
Status: DISCONTINUED | OUTPATIENT
Start: 2024-10-15 | End: 2024-10-15 | Stop reason: HOSPADM

## 2024-10-15 RX ORDER — DEXAMETHASONE SODIUM PHOSPHATE 4 MG/ML
INJECTION, SOLUTION INTRA-ARTICULAR; INTRALESIONAL; INTRAMUSCULAR; INTRAVENOUS; SOFT TISSUE
Status: DISCONTINUED | OUTPATIENT
Start: 2024-10-15 | End: 2024-10-15

## 2024-10-15 RX ORDER — LABETALOL HYDROCHLORIDE 5 MG/ML
INJECTION, SOLUTION INTRAVENOUS
Status: DISCONTINUED | OUTPATIENT
Start: 2024-10-15 | End: 2024-10-15

## 2024-10-15 RX ORDER — HYDROMORPHONE HYDROCHLORIDE 1 MG/ML
0.2 INJECTION, SOLUTION INTRAMUSCULAR; INTRAVENOUS; SUBCUTANEOUS EVERY 5 MIN PRN
Status: CANCELLED | OUTPATIENT
Start: 2024-10-15

## 2024-10-15 RX ORDER — LIDOCAINE HYDROCHLORIDE 20 MG/ML
INJECTION, SOLUTION EPIDURAL; INFILTRATION; INTRACAUDAL; PERINEURAL
Status: COMPLETED
Start: 2024-10-15 | End: 2024-10-15

## 2024-10-15 RX ORDER — GLUCAGON 1 MG
1 KIT INJECTION
Status: DISCONTINUED | OUTPATIENT
Start: 2024-10-15 | End: 2024-10-15 | Stop reason: HOSPADM

## 2024-10-15 RX ORDER — MUPIROCIN 20 MG/G
OINTMENT TOPICAL
Status: DISCONTINUED | OUTPATIENT
Start: 2024-10-15 | End: 2024-10-15 | Stop reason: HOSPADM

## 2024-10-15 RX ORDER — CELECOXIB 200 MG/1
400 CAPSULE ORAL
Status: COMPLETED | OUTPATIENT
Start: 2024-10-15 | End: 2024-10-15

## 2024-10-15 RX ORDER — OXYCODONE HYDROCHLORIDE 5 MG/1
5 TABLET ORAL
Status: DISCONTINUED | OUTPATIENT
Start: 2024-10-15 | End: 2024-10-15 | Stop reason: HOSPADM

## 2024-10-15 RX ORDER — BUPIVACAINE HYDROCHLORIDE 5 MG/ML
INJECTION, SOLUTION EPIDURAL; INTRACAUDAL
Status: COMPLETED
Start: 2024-10-15 | End: 2024-10-15

## 2024-10-15 RX ORDER — BACITRACIN ZINC 500 UNIT/G
OINTMENT (GRAM) TOPICAL
Status: DISCONTINUED | OUTPATIENT
Start: 2024-10-15 | End: 2024-10-15 | Stop reason: HOSPADM

## 2024-10-15 RX ORDER — ACETAMINOPHEN 500 MG
1000 TABLET ORAL
Status: COMPLETED | OUTPATIENT
Start: 2024-10-15 | End: 2024-10-15

## 2024-10-15 RX ORDER — ACETAMINOPHEN 500 MG
1000 TABLET ORAL
Status: DISCONTINUED | OUTPATIENT
Start: 2024-10-15 | End: 2024-10-15

## 2024-10-15 RX ORDER — ONDANSETRON HYDROCHLORIDE 2 MG/ML
4 INJECTION, SOLUTION INTRAVENOUS DAILY PRN
Status: DISCONTINUED | OUTPATIENT
Start: 2024-10-15 | End: 2024-10-15 | Stop reason: HOSPADM

## 2024-10-15 RX ORDER — BUPIVACAINE HYDROCHLORIDE 5 MG/ML
INJECTION, SOLUTION EPIDURAL; INTRACAUDAL
Status: COMPLETED | OUTPATIENT
Start: 2024-10-15 | End: 2024-10-15

## 2024-10-15 RX ORDER — LIDOCAINE HYDROCHLORIDE 20 MG/ML
INJECTION, SOLUTION EPIDURAL; INFILTRATION; INTRACAUDAL; PERINEURAL
Status: COMPLETED | OUTPATIENT
Start: 2024-10-15 | End: 2024-10-15

## 2024-10-15 RX ORDER — CELECOXIB 200 MG/1
400 CAPSULE ORAL
Status: DISCONTINUED | OUTPATIENT
Start: 2024-10-15 | End: 2024-10-15

## 2024-10-15 RX ORDER — KETAMINE HCL IN 0.9 % NACL 50 MG/5 ML
SYRINGE (ML) INTRAVENOUS
Status: DISCONTINUED | OUTPATIENT
Start: 2024-10-15 | End: 2024-10-15

## 2024-10-15 RX ORDER — HYDROCODONE BITARTRATE AND ACETAMINOPHEN 5; 325 MG/1; MG/1
1 TABLET ORAL EVERY 4 HOURS PRN
Status: DISCONTINUED | OUTPATIENT
Start: 2024-10-15 | End: 2024-10-15 | Stop reason: HOSPADM

## 2024-10-15 RX ADMIN — BUPIVACAINE HYDROCHLORIDE 20 ML: 5 INJECTION, SOLUTION EPIDURAL; INTRACAUDAL; PERINEURAL at 08:10

## 2024-10-15 RX ADMIN — Medication 30 MG: at 09:10

## 2024-10-15 RX ADMIN — ONDANSETRON 4 MG: 2 INJECTION INTRAMUSCULAR; INTRAVENOUS at 09:10

## 2024-10-15 RX ADMIN — LIDOCAINE HYDROCHLORIDE 100 MG: 10 INJECTION, SOLUTION INTRAVENOUS at 09:10

## 2024-10-15 RX ADMIN — Medication 20 MG: at 09:10

## 2024-10-15 RX ADMIN — DEXMEDETOMIDINE 10 MCG: 100 INJECTION, SOLUTION, CONCENTRATE INTRAVENOUS at 10:10

## 2024-10-15 RX ADMIN — LABETALOL HYDROCHLORIDE 10 MG: 5 INJECTION, SOLUTION INTRAVENOUS at 09:10

## 2024-10-15 RX ADMIN — DEXMEDETOMIDINE 20 MCG: 100 INJECTION, SOLUTION, CONCENTRATE INTRAVENOUS at 09:10

## 2024-10-15 RX ADMIN — CEFAZOLIN 2 G: 330 INJECTION, POWDER, FOR SOLUTION INTRAMUSCULAR; INTRAVENOUS at 09:10

## 2024-10-15 RX ADMIN — LIDOCAINE HYDROCHLORIDE 10 ML: 20 INJECTION, SOLUTION EPIDURAL; INFILTRATION; INTRACAUDAL; PERINEURAL at 08:10

## 2024-10-15 RX ADMIN — SODIUM CHLORIDE: 9 INJECTION, SOLUTION INTRAVENOUS at 09:10

## 2024-10-15 RX ADMIN — MIDAZOLAM 2 MG: 1 INJECTION INTRAMUSCULAR; INTRAVENOUS at 08:10

## 2024-10-15 RX ADMIN — FENTANYL CITRATE 50 MCG: 50 INJECTION INTRAMUSCULAR; INTRAVENOUS at 08:10

## 2024-10-15 RX ADMIN — MIDAZOLAM HYDROCHLORIDE 2 MG: 1 INJECTION, SOLUTION INTRAMUSCULAR; INTRAVENOUS at 10:10

## 2024-10-15 RX ADMIN — PROPOFOL 50 MCG/KG/MIN: 10 INJECTION, EMULSION INTRAVENOUS at 09:10

## 2024-10-15 RX ADMIN — MUPIROCIN: 20 OINTMENT TOPICAL at 08:10

## 2024-10-15 RX ADMIN — DEXAMETHASONE SODIUM PHOSPHATE 4 MG: 4 INJECTION, SOLUTION INTRAMUSCULAR; INTRAVENOUS at 09:10

## 2024-10-15 RX ADMIN — ACETAMINOPHEN 1000 MG: 500 TABLET ORAL at 08:10

## 2024-10-15 RX ADMIN — CELECOXIB 400 MG: 200 CAPSULE ORAL at 08:10

## 2024-10-15 NOTE — TRANSFER OF CARE
"Anesthesia Transfer of Care Note    Patient: Cody Castro    Procedure(s) Performed: Procedure(s) (LRB):  ORIF, FINGER LONG (Left)  IRRIGATION AND DEBRIDEMENT (Left)  REPAIR, NAIL BED (Left)  REPAIR, TENDON, FLEXOR (Left)    Patient location: [facility]    Anesthesia Type: general    Transport from OR: Transported from OR on room air with adequate spontaneous ventilation. Transported from OR on 6-10 L/min O2 by face mask with adequate spontaneous ventilation    Post pain: adequate analgesia    Post assessment: no apparent anesthetic complications and tolerated procedure well    Post vital signs: stable    Level of consciousness: awake    Nausea/Vomiting: no nausea/vomiting    Complications: none    Transfer of care protocol was followed      Last vitals: Visit Vitals  BP (!) 122/57   Pulse 77   Temp 36.7 °C (98 °F) (Oral)   Resp 18   Ht 5' 6" (1.676 m)   Wt 130.2 kg (287 lb)   SpO2 (!) 94%   BMI 46.32 kg/m²     "

## 2024-10-15 NOTE — ANESTHESIA PROCEDURE NOTES
L Supraclavicular Single Injection    Patient location during procedure: pre-op   Block not for primary anesthetic.  Reason for block: at surgeon's request and post-op pain management   Post-op Pain Location: L Finger Pain   Start time: 10/15/2024 8:46 AM  Timeout: 10/15/2024 8:45 AM   End time: 10/15/2024 8:55 AM    Staffing  Authorizing Provider: Bonita Reilly MD  Performing Provider: Bonita Reilly MD    Staffing  Performed by: Bonita Reilly MD  Authorized by: Bonita Reilly MD    Preanesthetic Checklist  Completed: patient identified, IV checked, site marked, risks and benefits discussed, surgical consent, monitors and equipment checked, pre-op evaluation and timeout performed  Peripheral Block  Patient position: supine  Prep: ChloraPrep  Patient monitoring: heart rate, cardiac monitor, continuous pulse ox, continuous capnometry and frequent blood pressure checks  Block type: supraclavicular  Laterality: left  Injection technique: single shot  Needle  Needle type: Stimuplex   Needle gauge: 22 G  Needle length: 2 in  Needle localization: anatomical landmarks and ultrasound guidance   -ultrasound image captured on disc.  Assessment  Injection assessment: negative aspiration, negative parasthesia and local visualized surrounding nerve  Paresthesia pain: none  Heart rate change: no  Slow fractionated injection: yes    Medications:    Medications: bupivacaine (pf) (MARCAINE) injection 0.5% - Perineural   20 mL - 10/15/2024 8:55:00 AM  lidocaine (PF) 20 mg/mL (2%) injection - Other   10 mL - 10/15/2024 8:55:00 AM    Additional Notes  VSS.  [facility] RN monitoring vitals throughout procedure.  Patient tolerated procedure well.

## 2024-10-15 NOTE — DISCHARGE INSTRUCTIONS
HAND SURGERY - Care of the Hand after Surgery       Post-Op Care  It is important to follow your orthopaedic surgeon's instructions carefully after you return home. You should ask   someone to check on you that evening. The protocols described here are general in nature. Every person and   every surgery is different so the information given here is for guidance only. If you have questions you should   contact us.     Day of Surgery    You were place in a alumafoam splint, soft dressing with coban today to protect the surgical area during healing. Do not remove the alumafoam splint, soft dressing with coban until you are seen by Occupational Therapy or Dr. Murray for your first postop visit    The hand and fingers may be numb for quite some time after surgery. This is due to the anesthetic block used at the time of surgery for pain control.   If you have an arm sling you may remove the sling at your convenience once you regain control of your arm from the anesthetic block.     Begin taking liquids and food as soon as you can. You should always eat some solid food, a sandwich or light meal, a little while before taking your pain meds.     Okay to resume your Xarelto POD #1    Day 3  Things are much the same on the third day after your surgery. Usually you have less pain and feel like doing more.    Showering: It is important to keep the incision absolutely dry while showering or bathing (use two plastic bags over your hand) or a shower bag.   If you feel that the pain medication you were given after surgery is stronger than you really need you can reduce the dose, take it less frequently or switch to ibuprofen or Tylenol. If you received antibiotics they should be taken until the entire prescription is completed.    Day 6  Occupational Therapy will see you between this time. Please let us know if you are not scheduled [phone]    Week 2  We will see you back after your surgery to review with you what was done in  surgery and will talk about rehab and answer any questions you may have.       HAND SURGERY  Driving: You can drive if you are comfortable and have regained full finger movements and if you have sufficient power to control the vehicle.   Return to work: Timing of your return to work is variable according to your occupation and specific surgery. We should discuss this at your follow up visit if not already discussed prior.  Elevation: Hand elevation is important to prevent swelling and stiffness of the fingers. One minute of leaving your hand dangling negates four hours of keeping it elevated. Please remember not to walk with your hand hanging down or to sit with your hand resting in your lap. It is fine, however, to lower your hand for light use and you should get back to normal light activities as soon as possible as guided by common sense.     Post-operative exercises  [] Bend your fingers  Relax your hand. Start with your fingers straight and close together. Bend the end and middle joints of your fingers. Keep your wrist and knuckles straight. Moving slowly and smoothly, return your hand to the starting position. Repeat with your other hand. If you can, perform multiple repetitions of this exercise on each hand.    [] Open your hand wide                       Spread your fingers apart as wide as you can and hold that position. Slowly relax your fingers and bring them together. Return to the open-wide position. Repeat with each hand and gradually add to the number of repetitions.    [] Make a fist  Start with your fingers straight and spread apart. Make a loose, gentle fist and wrap your thumb around the outside of your fingers. Be careful not to squeeze your fingers together too tightly. Moving slowly and smoothly, return to the starting position. Repeat. Perform this exercise on both hands.    [] Touch your fingertips  Straighten your fingers and thumb. Bend your thumb across your palm, touching the tip of your  "thumb to the pad of your hand just below your pinky finger. If you can't make your thumb touch, just stretch as far as you can. Return your thumb to its starting position, as shown in images 1 through 3.  For the next exercise, form the letter O by touching your thumb to each fingertip, as shown in images 4 through 6. Moving slowly and smoothly, touch your index finger to your thumb, then straighten your fingers. Touch your middle finger to your thumb and straighten. Follow with your ring and pinky fingers.    [] Walk your fingers  Rest your hand on a flat surface, such as a tabletop, with your palm facing down and your fingers spread slightly apart. Moving one finger at a time, slowly walk your fingers toward your thumb. Start by lifting and moving your index finger toward your thumb. Follow by lifting and moving your middle finger toward your thumb. Proceed with moving your ring finger and then your pinky finger toward your thumb. Don't move your wrist or thumb while doing this exercise. Repeat with your other hand.      HAND SURGERY - Common Concerns and Frequently Asked Questions    When to Call the Doctor  Pain, burning, or numbness of the fingers or the back of the hand not relieved by elevation of the arm  Pale or cold finger; bluish nail beds  Red line or streak going up the arm  Excessive swelling  Fever over 100.3ºF  Pain unrelieved by pain medication    Tips for one armed living  It helps to have...   In the shower   Plastic bags and rubber bands to cover bandages - the bags that newspapers come in are good to cover the hand and wrist. Otherwise small trash can liners will do. Use two at a time.   Bottle sponge (soft sponge on a long stick) - for the armpit of your "good" hand.   Shower brush   A hair brush in the shower will help you to wash your hair.   Cotton philip cloth bathrobe - to dry your back.    In the bathroom   Toothpaste, shampoo, etc. in flip-top or pump (not screw top) dispensers. " "  Consider an electric razor.   Flossers (dental floss on a "Y" shaped handle).    In the kitchen   Dycem mat (rubber jar opener mat) - to help open jars, but also keep things from sliding around while you are working on them.    Double suction cup pads ("little Octopus") - to hold items while you use or wash them.   Electric can opener with a lid magnet strong enough to hold the can in the air - for one handed use.   In the bedroom   Back scratcher.   Large sleeve shirts and tops.   Put away clothing which buttons, fastens or snaps in the back or which uses drawstrings.    Sports bra or a camisole instead of a bra.   [employer] nylons can be pulled on one handed - most others can't.   A "wash and wear" haircut.     "

## 2024-10-15 NOTE — DISCHARGE SUMMARY
[facility] - Surgery ([facility])  Brief Operative Note    Surgery Date: 10/15/2024     Surgeons and Role:     * Christine Murray MD - Primary    Assisting Surgeon: None    Pre-op Diagnosis:  Open displaced fracture of distal phalanx of finger of left hand [S62.639B]  Injury of nail bed of finger of left hand, initial encounter [S69.92XA]    Post-op Diagnosis:  Post-Op Diagnosis Codes:     * Open displaced fracture of distal phalanx of finger of left hand [S62.639B]     * Injury of nail bed of finger of left hand, initial encounter [S69.92XA]    Procedure(s) (LRB):  ORIF, FINGER LONG (Left)  IRRIGATION AND DEBRIDEMENT (Left)  REPAIR, NAIL BED (Left)  REPAIR, TENDON, FLEXOR (Left)    Anesthesia: Choice    Estimated Blood Loss: * No values recorded between 10/15/2024 10:00 AM and 10/15/2024 11:10 AM *         Specimens:   Specimen (24h ago, onward)      None              Discharge Note    OUTCOME: Patient tolerated treatment/procedure well without complication and is now ready for discharge.    DISPOSITION: Home or Self Care    FINAL DIAGNOSIS:   Open displaced fracture of distal phalanx of finger of left hand   Injury of nail bed of finger of left hand, initial encounter     FOLLOWUP: In clinic    DISCHARGE INSTRUCTIONS:    Discharge Procedure Orders   Diet general     Keep surgical extremity elevated     Ice to affected area     Lifting restrictions     No driving, operating heavy equipment or signing legal documents while taking pain medication.     Leave dressing on - Keep it clean, dry, and intact until clinic visit     Call MD for:  temperature >100.4     Call MD for:  persistent nausea and vomiting     Call MD for:  severe uncontrolled pain     Call MD for:  difficulty breathing, headache or visual disturbances     Call MD for:  redness, tenderness, or signs of infection (pain, swelling, redness, odor or green/yellow discharge around incision site)     Call MD for:  hives     Call MD for:  persistent dizziness or  light-headedness     Call MD for:  extreme fatigue

## 2024-10-15 NOTE — PLAN OF CARE
Pre op procedure complete. All questions answered. Pt lying in bed, call bed in reach. Pt's belongings in bag at bedside, will place in locker. Wife brought to bedside. Still needs site saleem.

## 2024-10-15 NOTE — PLAN OF CARE
Patient is AAO and VSS.  Tolerating PO and states pain is tolerable.  Dressing CDI.  Patient states they are ready for d/c.  IV removed.  Catheter tip intact.  Spouse at bedside.  Discharge instructions reviewed and copy given to the patient and spouse.  Questions answered.  Both verbalized understanding.  Medication delivered to bedside.  Patient wheeled to car by Yomaira RUBALCAVA

## 2024-10-15 NOTE — OP NOTE
[facility])  Surgery Department  Operative Note    SUMMARY     Date of Procedure: 10/15/2024     Procedure:     1. Left long finger open reduction internal fixation distal phalanx, cpt 95644  2. Left long finger extensor tendon repair, cpt 84360  3. Left long finger irrigation and sharp excisional debridement skin, subcutaneous tissue and bone, cpt 30328  4. Left long finger nail bed repair, cpt 77306      Surgeons and Role:     * Christine Murray MD - Primary    Assisting Surgeon: None    PA: Lucina Nguyen    Pre-Operative Diagnosis: Open displaced fracture of distal phalanx of finger of left hand [S62.639B]  Left long finger extensor tendon laceration  Left long finger open wound  Injury of nail bed of finger of left hand, initial encounter [S69.92XA]    Post-Operative Diagnosis: Post-Op Diagnosis Codes:     * Open displaced fracture of distal phalanx of finger of left hand [S62.639B]     * Injury of nail bed of finger of left hand, initial encounter [S69.92XA]  Left long finger extensor tendon laceration  Left long finger open wound    Anesthesia: MAC and regional    Indication for Procedure: 42 yo male with near complete avulsion of left long finger at the DIP joint after it was caught by a washing machine agitator. He sustained an open distal phalanx fracture, extensor tendon laceration, nail laceration.  Risks and benefits of the procedure were discussed with the patient and informed consent was obtained.    Description of the Findings of the Procedure: The patient was seen in the preoperative holding area and the left long finger was marked. Regional anesthesia was achieved in the pre-operative holding area. The patient was taken to the OR, placed supine on the table, and a padded hand table was used. After monitored anesthesia care was admitted without difficulty, a time-out procedure was performed identifying the patient, the operative site and the procedure to be performed. A tourniquet  was placed on the arm and the upper extremity was prepped and draped in standard sterile fashion.  An Esmarch was used to exsanguinated the left upper extremity and the tourniquet was inflated to 250 mm Hg.      The previous sutures were removed from the laceration.  The wound was explored, there was a dorsal fracture of the distal phalanx, nail bed laceration as well as extensor tendon laceration.  A curette as well as scalpel were used for sharp excisional debridement of any nonviable skin, subcutaneous tissue and bone.  The wound was copiously irrigated with 1 L of normal saline.  5-0 chromic was used to repair the terminal extensor tendon. 5-0 chromic was used to repair the nail germinal matrix.  A 0.045 k-wire was driven retrograde across the distal phalanx, the fracture was held reduced and the K-wire was driven into the middle phalanx and buried subchondrally.  The K-wire was truncated underneath the skin at the tip.  4-0 chromic was used to repair the circumferential laceration to the finger.      The tourniquet was deflated and the finger was pink and well-perfused.  The wound was then irrigated with normal saline using a bulb syringe. Adaptic, 4x4, cast padding, tube gauze, alumafoam splint and coban were used to dress the finger.  All instrument, sponge and needle counts were correct. The patient tolerated the procedure well.  He was taken awake, alert and in good condition to the recovery room.      Complications: No    Estimated Blood Loss (EBL): minimal           Implants:   Implant Name Type Inv. Item Serial No.  Lot No. LRB No. Used Action   GUIDEWIRE ST SM BNE .[id number] - [id number]  GUIDEWIRE ST SM BNE .[id number]  [employer] [id number] Left 1 Implanted       Specimens:   Specimen (24h ago, onward)      None                    Condition: Good    Disposition: PACU - hemodynamically stable.    Attestation: I was present and scrubbed for the entire procedure.

## 2024-10-15 NOTE — ANESTHESIA POSTPROCEDURE EVALUATION
Anesthesia Post Evaluation    Patient: Cody Castro    Procedure(s) Performed: Procedure(s) (LRB):  ORIF, FINGER LONG (Left)  IRRIGATION AND DEBRIDEMENT (Left)  REPAIR, NAIL BED (Left)  REPAIR, TENDON, FLEXOR (Left)    Final Anesthesia Type: general      Patient location during evaluation: PACU  Patient participation: Yes- Able to Participate  Level of consciousness: awake and alert  Post-procedure vital signs: reviewed and stable  Pain management: adequate  Airway patency: patent  YOJANA mitigation strategies: Multimodal analgesia  PONV status at discharge: No PONV  Anesthetic complications: no      Cardiovascular status: blood pressure returned to baseline, hemodynamically stable and stable  Respiratory status: unassisted, room air and spontaneous ventilation  Hydration status: euvolemic  Follow-up not needed.              Vitals Value Taken Time   /72 10/15/24 1116   Temp 36.6 °C (97.9 °F) 10/15/24 1116   Pulse 78 10/15/24 1128   Resp 23 10/15/24 1128   SpO2 89 % 10/15/24 1128   Vitals shown include unfiled device data.      No case tracking events are documented in the log.      Pain/Leonel Score: Pain Rating Prior to Med Admin: 7 (10/15/2024  8:45 AM)  Pain Rating Post Med Admin: 7 (10/15/2024  8:45 AM)  Leonel Score: 8 (10/15/2024 11:16 AM)

## 2024-10-15 NOTE — PLAN OF CARE
Dr. Murray notified of Xarelto taken on 10/14, ramya notified pt had 2 cigarettes this morning 10/15

## 2024-10-16 LAB
ACID FAST MOD KINY STN SPEC: NORMAL
MYCOBACTERIUM SPEC QL CULT: NORMAL

## 2024-10-17 LAB
BACTERIA SPEC AEROBE CULT: NORMAL
BACTERIA SPEC ANAEROBE CULT: NORMAL

## 2024-10-21 ENCOUNTER — TELEPHONE (OUTPATIENT)
Dept: ORTHOPEDICS | Facility: CLINIC | Age: 44
End: 2024-10-21
Payer: COMMERCIAL

## 2024-10-21 ENCOUNTER — CLINICAL SUPPORT (OUTPATIENT)
Dept: REHABILITATION | Facility: HOSPITAL | Age: 44
End: 2024-10-21
Attending: ORTHOPAEDIC SURGERY
Payer: COMMERCIAL

## 2024-10-21 DIAGNOSIS — M79.645 PAIN OF FINGER OF LEFT HAND: Primary | ICD-10-CM

## 2024-10-21 DIAGNOSIS — M25.642 DECREASED RANGE OF MOTION OF FINGER OF LEFT HAND: ICD-10-CM

## 2024-10-21 PROCEDURE — 97166 OT EVAL MOD COMPLEX 45 MIN: CPT

## 2024-10-21 PROCEDURE — L3933 FO W/O JOINTS CF: HCPCS

## 2024-10-21 NOTE — TELEPHONE ENCOUNTER
Patient presented to clinic requesting letter to be out of work until we see him for follow up on 10/29/24    Gave him information for disability desk:  [email or web address] or call [phone] opt 2 or (F) [phone]

## 2024-10-21 NOTE — PLAN OF CARE
[facility]  Occupational Therapy Initial Evaluation     Name: Cody Castro  Clinic Number: [id number]    Therapy Diagnosis:   Encounter Diagnoses   Name Primary?    Pain of finger of left hand Yes    Decreased range of motion of finger of left hand      Physician: Christine Murray MD    Physician Orders: Eval and Treat, Open reduction and pinning distal phalaanx and Nail Bed repair   DOS 10/15/24 Pt to be seen 5-7 days post-op for the following:   Fabricate a DIP Protector () to be worn until RTD.  Evaluate incisions: If appropriate sutures to be removed at 10-11 days, 13 days if DMII contact MD or do not remove sutures if any drainage or concerns for wound healing. Percutaneous pinning (pin is internal).   Pt to be seen for splint checks PRN and to assess for PIP stiffness  Pt is to see physician 2-4 weeks post-op.    Medical Diagnosis: Open displaced fracture of distal phalanx of finger of left hand [S62.639B], Injury of nail bed of finger of left hand, initial encounter [S69.92XA]   Surgical Procedure and Date: 10/15/24, 1. Left long finger open reduction internal fixation distal phalanx, cpt 75451  2. Left long finger extensor tendon repair, cpt 13399  3. Left long finger irrigation and sharp excisional debridement skin, subcutaneous tissue and bone, cpt 40716  4. Left long finger nail bed repair, cpt 76093  Onset Date: 10/9/24  Evaluation Date: 10/21/2024  Insurance Authorization Period Expiration: 12/31/24  Plan of Care Certification Period: 1/3/25  Date of Return to MD: 10/28/24  Visit # / Visits authorized: 1 / 1    FOTO: 1/3, eval 10/21/24  42%  Lobby access code: [email or web address]     Precautions:  Standard and Weightbearing, healing wound, no ROM of DIP due to pin    Time In:10:00  Time Out: 11:00  Total Appointment Time (timed & untimed codes): 60 minutes    Subjective     Date of Onset: 10/9/24, surgery on 10/15/24    History of Current Condition/Mechanism of Injury: Cody  reports: he injured his finger when got caught in a washing machine. He c/o pain and stiffness in his finger. His wife was present today.     Falls: none    Involved Side: left  Dominant Side: Left    Mechanism of Injury: hand got caught on the washing machine agitator  Surgical Procedure: see above  Imaging: x-ray on 10/14/24 indicated: Kinder splint and surgical dressing are in place. Soft tissues of the distal phalanx of the 3rd finger and been destructed no obvious underlying fractures seen distally. There is little pelon of calcification at the proximal interphalangeal joint of that could be a small avulsion.     Prior Therapy: not for this    Pain:  Functional Pain Scale Rating 0-10:   4/10 on average  2/10 at best  6/10 at worst  Location: left LF and up into arm  Description: Variable  Aggravating Factors: nothing in particular, or if trying to move it  Easing Factors: elevation and pain medicine    Occupation:  works for the pump station for [employer],   Working presently: employed  Duties: occasionally has to mess with chemicals, mowing grass, clean debris from canals,     Functional Limitations/Social History:    Previous functional status includes: Independent with all ADLs.      Current Functional Status   Home/Living environment: lives with their family          Limitation of Functional Status as follows:   ADLs/IADLs:     - Feeding: using mainly R hand, getting assist with cutting food    - Bathing: I    - Dressing/Grooming: I, using more of the R hand    - Home Management: able to open light things    - Driving: mod I    -not yet back at work         Leisure: did not report limitation    Patient's Goals for Therapy: to keep the tip of his finger    Past Medical History/Physical Systems Review:   Cody Castro  has a past medical history of Anxiety, Carbon monoxide poisoning, Chronic combined systolic and diastolic heart failure, Chronic systolic congestive heart failure, COPD  exacerbation, Coronary artery disease, History of heart artery stent, Hypertension, Ischemic cardiomyopathy, NSTEMI (non-ST elevated myocardial infarction), Obesity, YOJANA (obstructive sleep apnea), and Paroxysmal A-fib.    Cody Castro  has a past surgical history that includes Coronary angioplasty; Left heart catheterization (Right, 6/25/2018); Open reduction and internal fixation (ORIF) of injury of finger (Left, 10/15/2024); irrigation and debridement (Left, 10/15/2024); Repair of nail bed (Left, 10/15/2024); and Flexor tendon repair (Left, 10/15/2024).    Cody has a current medication list which includes the following prescription(s): albuterol, aspirin, clonazepam, docusate sodium, ezetimibe, hydrocodone-acetaminophen, lisinopril, metoprolol succinate, naproxen, ondansetron, oxycodone-acetaminophen, rivaroxaban, rosuvastatin, and tiotropium bromide.    Review of patient's allergies indicates:  No Known Allergies     Objective     Observation/Appearance:  arrived in post op dressing and finger splint. Healing incisions with min sanguinous drainage and sutures intact. Min eschar noted. Incisions on dorsal and volar LF at distal phalanx and DIP.         Edema. Measured in centimeters.   10/21/2024 10/21/2024    Right  Left    Long:       P1 NT today 8.5    PIP  8.2   P2  7.4    DIP  7.5   P3  7.5     Elbow and Wrist ROM. Measured in degrees.   10/25/2024 10/25/2024    Right Left   Elbow Ext/Flex     Supination/Pronation     Wrist Ext/Flex WFL WFL   Wrist RD/UD WFL        WFL       Hand ROM. Measured in degrees.  Right hand digits WNL   10/21/2024    Left        Index: MP  65              PIP     47              DIP 10              JARAMILLO 122       Long:  MP 70              PIP 28              DIP 15/15              JARAMILLO 98       Ring:   MP               PIP               DIP               JARAMILLO WFL       Small:  MP                PIP                DIP               JARAMILLO WFL         Sensation: pt reports numbness  around incisions and at tip of finger. Not formally tested this date  Distribution 10/21/2024 10/21/2024    Right  Left    [place] S[place]     Normal 1.65-2.83     Diminished Light Touch 3.22-3.61     Diminished Protective 3.84-4.31     Loss of Protective 4.56-6.65     Untestable >6.65     2 Point Discrimination     Static     Dynamic          Strength (Dyanmometer) and Pinch Strength (Pinch Gauge)  Measured in pounds and psi. Average of three trials.   10/21/2024 10/21/2024    Right  Left    Rung II deferred deferred   Key Pinch     3pt Pinch     2pt Pinch               CMS Impairment/Limitation/Restriction for FOTO Hand Survey    FOTO scores for Cody Castro on 10/21/2024.   FOTO documents entered into [employer] - see Media section.    Intake Score: 42%           Treatment     Total Treatment time separate from Evaluation time:25 minutes    Cody received the treatments listed below:      Post op dressing and splint removed. Redressed finger incisions with adaptic and gauze wrap.     Fabricated a custom static volar DIP extension orthosis to left LF to protect recent surgery.  Instructed to wear full time with removal for bathing/hygiene.  Instructed to monitor for pain/pressure, increased edema, or redness and to contact office for adjustments as needed. Patient reported good understanding of orthotic wear and care schedule. Pt reported good fit of orthosis. ()      Patient Education and Home Exercises      Education provided:   -role of OT, goals for OT, scheduling/cancellations, insurance limitations with patient.  -Additional Education provided:   -educated on orthosis wear, incision care, dressing the wound, and precautions  -educated on HEP including AROM PIP flexion/ext, waves, straight fist, finger spreads    Written Home Exercises Provided: yes.  Exercises were reviewed and Cody was able to demonstrate them prior to the end of the session.    Cody demonstrated good  understanding of the  education provided.     Pt was advised to perform these exercises free of pain, and to stop performing them if pain occurs.    See EMR under Patient Instructions for exercises provided 10/21/2024.    Assessment     Cody Castro is a 43 y.o. male referred to outpatient occupational therapy and presents with a medical diagnosis of s/p left LF ORIF of Distal phalanx and extensor tendon repair.    Following medical record review it is determined that pt will benefit from occupational therapy services in order to maximize pain free and/or functional use of left hand. The following goals were discussed with the patient and patient is in agreement with them as to be addressed in the treatment plan. The patient's rehab potential is Good.     Anticipated barriers to occupational therapy: severity of injury to finger    Plan of care discussed with patient: Yes  Patient's spiritual, cultural and educational needs considered and patient is agreeable to the plan of care and goals as stated below:     Medical Necessity is demonstrated by the following  Occupational Profile/History  Co-morbidities and personal factors that may impact the plan of care [] LOW: Brief chart review  [x] MODERATE: Expanded chart review   [] HIGH: Extensive chart review    Moderate / High Support Documentation:  expanded chart review     Examination  Performance deficits relating to physical, cognitive or psychosocial skills that result in activity limitations and/or participation restrictions  [] LOW: addressing 1-3 Performance deficits  [] MODERATE: 3-5 Performance deficits  [x] HIGH: 5+ Performance deficits (please support below)    Moderate / High Support Documentation:    Physical:  Joint Mobility  Muscle Power/Strength  Skin Integrity/Scar Formation  Edema   Strength  Pain    Cognitive:  No Deficits    Psychosocial:    Habits  Routines     Treatment Options [] LOW: Limited options  [x] MODERATE: Several options  [] HIGH: Multiple options      min assistance required     Decision Making/ Complexity Score: moderate       The following goals were discussed with the patient and patient is in agreement with them as to be addressed in the treatment plan.     Goals:   Long Term Goals (LTGs); to be met by discharge.  1) Pt will report pain no higher than 1/10 with daily tasks and job duties  2) Pt will have an improved FOTO score by at least 20 points  3) Pt will demonstrate improved left LF AROM WFL for performing his daily tasks and grasping  4) Pt will report return to prior level of function  5)  strength to be assessed when appropriate, and set goal    Short Term Goals (STGs); to be met within 4 weeks (11/21/24).  1) Pt will report or demonstrate independence with HEP and scar management  2) Pt will report pain no higher than 4/10 with daily tasks  3) Pt will demonstrate improved AROM of JARAMILLO of left LF , excluding DIP, for improved fist and grasp  4) Pt will demonstrate improved edema of left LF form improved motion and use      Plan   Certification Period/Plan of care expiration: 10/21/2024 to 1/3/25.    Outpatient Occupational Therapy 2-3 times weekly for 10 weeks to include the following interventions: Paraffin, Fluidotherapy, Manual therapy/joint mobilizations, Modalities for pain management, US 3 mhz, Therapeutic exercises/activities., Strengthening, Orthotic Fabrication/Fit/Training, Edema Control, Scar Management, and Wound Care.    ERIC Smith, T      I certify the need for these services furnished under the plan of treatment and while under my care.     ____________________________________________________________________  Physician/Referring Practitioner   Date of Signature

## 2024-10-25 PROBLEM — M25.642 DECREASED RANGE OF MOTION OF FINGER OF LEFT HAND: Status: ACTIVE | Noted: 2024-10-25

## 2024-10-25 PROBLEM — M79.645 PAIN OF FINGER OF LEFT HAND: Status: ACTIVE | Noted: 2024-10-25

## 2024-10-28 ENCOUNTER — CLINICAL SUPPORT (OUTPATIENT)
Dept: REHABILITATION | Facility: HOSPITAL | Age: 44
End: 2024-10-28
Attending: ORTHOPAEDIC SURGERY
Payer: COMMERCIAL

## 2024-10-28 ENCOUNTER — OFFICE VISIT (OUTPATIENT)
Dept: ORTHOPEDICS | Facility: CLINIC | Age: 44
End: 2024-10-28
Payer: COMMERCIAL

## 2024-10-28 ENCOUNTER — HOSPITAL ENCOUNTER (OUTPATIENT)
Dept: RADIOLOGY | Facility: HOSPITAL | Age: 44
Discharge: HOME | End: 2024-10-28
Attending: ORTHOPAEDIC SURGERY
Payer: COMMERCIAL

## 2024-10-28 DIAGNOSIS — S62.639B OPEN DISPLACED FRACTURE OF DISTAL PHALANX OF FINGER OF LEFT HAND: ICD-10-CM

## 2024-10-28 DIAGNOSIS — M79.645 FINGER PAIN, LEFT: Primary | ICD-10-CM

## 2024-10-28 DIAGNOSIS — M79.645 PAIN OF FINGER OF LEFT HAND: Primary | ICD-10-CM

## 2024-10-28 DIAGNOSIS — M79.645 FINGER PAIN, LEFT: ICD-10-CM

## 2024-10-28 DIAGNOSIS — M25.642 DECREASED RANGE OF MOTION OF FINGER OF LEFT HAND: ICD-10-CM

## 2024-10-28 DIAGNOSIS — S69.92XA INJURY OF NAIL BED OF FINGER OF LEFT HAND, INITIAL ENCOUNTER: ICD-10-CM

## 2024-10-28 PROCEDURE — 73140 X-RAY EXAM OF FINGER(S): CPT | Mod: TC,LT

## 2024-10-28 PROCEDURE — 1159F MED LIST DOCD IN RCRD: CPT | Mod: CPTII,S$GLB,, | Performed by: ORTHOPAEDIC SURGERY

## 2024-10-28 PROCEDURE — 99024 POSTOP FOLLOW-UP VISIT: CPT | Mod: S$GLB,,, | Performed by: ORTHOPAEDIC SURGERY

## 2024-10-28 PROCEDURE — 1160F RVW MEDS BY RX/DR IN RCRD: CPT | Mod: CPTII,S$GLB,, | Performed by: ORTHOPAEDIC SURGERY

## 2024-10-28 PROCEDURE — 4010F ACE/ARB THERAPY RXD/TAKEN: CPT | Mod: CPTII,S$GLB,, | Performed by: ORTHOPAEDIC SURGERY

## 2024-10-28 PROCEDURE — 97530 THERAPEUTIC ACTIVITIES: CPT

## 2024-10-28 PROCEDURE — 73140 X-RAY EXAM OF FINGER(S): CPT | Mod: 26,LT,, | Performed by: RADIOLOGY

## 2024-10-28 PROCEDURE — 99999 PR PBB SHADOW E&M-EST. PATIENT-LVL IV: CPT | Mod: PBBFAC,,, | Performed by: ORTHOPAEDIC SURGERY

## 2024-10-30 ENCOUNTER — DOCUMENTATION ONLY (OUTPATIENT)
Dept: ORTHOPEDICS | Facility: CLINIC | Age: 44
End: 2024-10-30
Payer: COMMERCIAL

## 2024-11-01 ENCOUNTER — TELEPHONE (OUTPATIENT)
Dept: CARDIOLOGY | Facility: CLINIC | Age: 44
End: 2024-11-01
Payer: COMMERCIAL

## 2024-11-08 ENCOUNTER — PATIENT MESSAGE (OUTPATIENT)
Dept: ORTHOPEDICS | Facility: CLINIC | Age: 44
End: 2024-11-08
Payer: COMMERCIAL

## 2024-11-08 ENCOUNTER — DOCUMENTATION ONLY (OUTPATIENT)
Dept: ORTHOPEDICS | Facility: CLINIC | Age: 44
End: 2024-11-08
Payer: COMMERCIAL

## 2024-11-19 ENCOUNTER — TELEPHONE (OUTPATIENT)
Dept: SLEEP MEDICINE | Facility: CLINIC | Age: 44
End: 2024-11-19
Payer: COMMERCIAL

## 2024-11-19 NOTE — TELEPHONE ENCOUNTER
Spoke with pt to confirm his upcoming appointment with Sandro Bravo. Pt stated he will be out of town. I offered to reschedule the appointment, patient stated that he will reschedule on his portal at his own time.

## 2024-11-25 ENCOUNTER — OFFICE VISIT (OUTPATIENT)
Dept: ORTHOPEDICS | Facility: CLINIC | Age: 44
End: 2024-11-25
Payer: COMMERCIAL

## 2024-11-25 DIAGNOSIS — S62.639B OPEN DISPLACED FRACTURE OF DISTAL PHALANX OF FINGER OF LEFT HAND: ICD-10-CM

## 2024-11-25 DIAGNOSIS — S69.92XD INJURY OF NAIL BED OF FINGER OF LEFT HAND, SUBSEQUENT ENCOUNTER: Primary | ICD-10-CM

## 2024-11-25 PROCEDURE — 1159F MED LIST DOCD IN RCRD: CPT | Mod: CPTII,S$GLB,, | Performed by: ORTHOPAEDIC SURGERY

## 2024-11-25 PROCEDURE — 4010F ACE/ARB THERAPY RXD/TAKEN: CPT | Mod: CPTII,S$GLB,, | Performed by: ORTHOPAEDIC SURGERY

## 2024-11-25 PROCEDURE — 1160F RVW MEDS BY RX/DR IN RCRD: CPT | Mod: CPTII,S$GLB,, | Performed by: ORTHOPAEDIC SURGERY

## 2024-11-25 PROCEDURE — 99999 PR PBB SHADOW E&M-EST. PATIENT-LVL III: CPT | Mod: PBBFAC,,, | Performed by: ORTHOPAEDIC SURGERY

## 2024-11-25 PROCEDURE — 99024 POSTOP FOLLOW-UP VISIT: CPT | Mod: S$GLB,,, | Performed by: ORTHOPAEDIC SURGERY

## 2024-11-25 NOTE — LETTER
[address]  [place] [address]  Phone: [phone]  Fax: [phone]     11/25/2024    Patient: Cody Castro   YOB: 1980   Date of Visit: 11/25/2024        To Whom It May Concern:    This is to inform you that Cody Castro is a patient under my care.  Please continue patient's light duty with the following restrictions: No pushing, pulling or lifting left upper extremity.    Next appointment: 2 weeks    Please don't hesitate to call or reach out with questions/concerns.    Sincerely,      Christine Murray MD  Hand & Wrist Orthopaedic Surgery  11/25/2024

## 2024-11-25 NOTE — PROGRESS NOTES
Cody Castro presents for post-operative evaluation of   Encounter Diagnoses   Name Primary?    Injury of nail bed of finger of left hand, subsequent encounter Yes    Open displaced fracture of distal phalanx of finger of left hand    . The patient is now almost 6 weeks s/p left long finger open reduction internal fixation, nailbed repair, tendon repair, irrigation and debridement.  Overall the patient reports doing well.  The patient reports appropriate postoperative soreness with well controlled overall pain.    History of Present Illness              Vitals:    11/25/24 1530   PainSc: 0-No pain   PainLoc: Hand       PE:    AA&O x 4.  NAD  HEENT:  NCAT, sclera nonicteric  Lungs:  Respirations are equal and unlabored.  CV:  2+ bilateral upper and lower extremity pulses.  MSK: Physical Exam          The incision is well healed.  Full wrist and finger motion except DIP joint.  Neurovascularly intact and has 5/5 thenar and intrinsic musculature strength.       Diagnostic studies and other clinical records review:  X-rays AP, lateral and oblique left long finger taken today are independently reviewed by me and shows healing fracture with no change in joint alignment.    Assessment & Plan: Assessment & Plan           Status post above, doing well  Continue with range of motion and splint wear  F/U 2 weeks with new x-rays of the left long finger for K-wire removal in clinic  Call with any questions/concerns in the interim        Christine Murray MD    Please be aware that this note has been generated with the assistance of [employer] voice-to-text.  Please excuse any spelling or grammatical errors.  This note was generated with the assistance of ambient listening technology. Verbal consent was obtained by the patient and accompanying visitor(s) for the recording of patient appointment to facilitate this note. I attest to having reviewed and edited the generated note for accuracy, though some syntax or spelling errors  may persist. Please contact the author of this note for any clarification.

## 2024-12-03 ENCOUNTER — PATIENT MESSAGE (OUTPATIENT)
Dept: ORTHOPEDICS | Facility: CLINIC | Age: 44
End: 2024-12-03
Payer: COMMERCIAL

## 2024-12-09 ENCOUNTER — OFFICE VISIT (OUTPATIENT)
Dept: ORTHOPEDICS | Facility: CLINIC | Age: 44
End: 2024-12-09
Payer: COMMERCIAL

## 2024-12-09 ENCOUNTER — HOSPITAL ENCOUNTER (OUTPATIENT)
Dept: RADIOLOGY | Facility: HOSPITAL | Age: 44
Discharge: HOME | End: 2024-12-09
Attending: ORTHOPAEDIC SURGERY
Payer: COMMERCIAL

## 2024-12-09 DIAGNOSIS — M79.645 FINGER PAIN, LEFT: ICD-10-CM

## 2024-12-09 DIAGNOSIS — S62.639B OPEN DISPLACED FRACTURE OF DISTAL PHALANX OF FINGER OF LEFT HAND: ICD-10-CM

## 2024-12-09 DIAGNOSIS — S69.92XD INJURY OF NAIL BED OF FINGER OF LEFT HAND, SUBSEQUENT ENCOUNTER: ICD-10-CM

## 2024-12-09 DIAGNOSIS — M79.645 FINGER PAIN, LEFT: Primary | ICD-10-CM

## 2024-12-09 DIAGNOSIS — Z98.890 POST-OPERATIVE STATE: ICD-10-CM

## 2024-12-09 PROCEDURE — 73140 X-RAY EXAM OF FINGER(S): CPT | Mod: 26,LT,, | Performed by: RADIOLOGY

## 2024-12-09 PROCEDURE — 99024 POSTOP FOLLOW-UP VISIT: CPT | Mod: S$GLB,,, | Performed by: ORTHOPAEDIC SURGERY

## 2024-12-09 PROCEDURE — 99999 PR PBB SHADOW E&M-EST. PATIENT-LVL III: CPT | Mod: PBBFAC,,, | Performed by: ORTHOPAEDIC SURGERY

## 2024-12-09 PROCEDURE — 1160F RVW MEDS BY RX/DR IN RCRD: CPT | Mod: CPTII,S$GLB,, | Performed by: ORTHOPAEDIC SURGERY

## 2024-12-09 PROCEDURE — 73140 X-RAY EXAM OF FINGER(S): CPT | Mod: TC,LT

## 2024-12-09 PROCEDURE — 1159F MED LIST DOCD IN RCRD: CPT | Mod: CPTII,S$GLB,, | Performed by: ORTHOPAEDIC SURGERY

## 2024-12-09 NOTE — PROCEDURES
Pre-procedure diagnosis: Retained hardware left long finger  Post procedure diagnosis: Retained hardware left long finger     Procedure: Hardware removal left long finger     Patient was seen in the clinic room and was seated at the treatment table. Attention was directed to the left hand, the area was prepped with alcohol.  Approximately 1 cc of 1% lidocaine without epi was injected into the left long finger tip. Once the area was anesthetized allowing 2-3 minutes for the anesthetic to take effect, I utilized an 25 gauge needle to make a stab incision over the k-wire. A needle  was used to completely remove the k-wire. A bandaid was used to dress the finger. The patient tolerated the procedure well.

## 2024-12-09 NOTE — LETTER
[address]  [place] [address]  Phone: [phone]  Fax: [phone]     12/09/2024    Patient: Cody Castro   YOB: 1980   Date of Visit: 12/09/2024        To Whom It May Concern:    This is to inform you that Cody Castro is a patient under my care.  Please allow patient to return to work on 12/16/24, Full duty with no restrictions.    Next appointment: 6 weeks    Please don't hesitate to call or reach out with questions/concerns.    Sincerely,      Christine Murray MD  Hand & Wrist Orthopaedic Surgery  12/09/2024

## 2024-12-09 NOTE — PROGRESS NOTES
Cody Castro presents for post-operative evaluation of   Encounter Diagnoses   Name Primary?    Finger pain, left Yes    Post-operative state     Open displaced fracture of distal phalanx of finger of left hand     Injury of nail bed of finger of left hand, subsequent encounter      History of Present Illness      The patient is now almost 8 weeks s/p left long finger I&D, ORIF, nail bed repair.  Overall the patient reports doing well.  The patient reports no pain.        There were no vitals filed for this visit.      PE:    AA&O x 4.  NAD  HEENT:  NCAT, sclera nonicteric  Lungs:  Respirations are equal and unlabored.  CV:  2+ bilateral upper and lower extremity pulses.  MSK: Physical Exam          The incision is well healed.  Full wrist and finger motion with the exception of DIP joint long finger.  Neurovascularly intact and has 5/5 thenar and intrinsic musculature strength.       Diagnostic studies and other clinical records review:  X-rays AP, lateral and oblique left long finger taken today are independently reviewed by me and shows well healed distal phalanx    Assessment & Plan: Assessment & Plan           Status post above, doing well  Continue with range of motion; strengthening, nighttime splint wear, k-wire removed today  F/U 4-6 weeks  Call with any questions/concerns in the interim        Christine Murray MD    Please be aware that this note has been generated with the assistance of [employer] voice-to-text.  Please excuse any spelling or grammatical errors.  This note was generated with the assistance of ambient listening technology. Verbal consent was obtained by the patient and accompanying visitor(s) for the recording of patient appointment to facilitate this note. I attest to having reviewed and edited the generated note for accuracy, though some syntax or spelling errors may persist. Please contact the author of this note for any clarification.

## 2024-12-09 NOTE — PATIENT INSTRUCTIONS
Today, you saw Dr. Murray and were aftercare following surgery    We decided the next step(s) in in your post-surgical care is/are  Weight bearing:  Non-weight bearing for 1 more week, then able to weight bear as tolerated.  Immobilization:  Continue to wear splint nightly for the next 4 weeks.   Therapy: None  Wound care:  Keep covered with bandage until scab forms, apply bacitracin at bandage change.  Showering: Ok to shower, but do not submerge in tub/water until scabs resolved.      Thank you for allowing me to participate in your care.  We will see you back in 6 weeks with new xrays Left long finger.

## 2024-12-11 ENCOUNTER — TELEPHONE (OUTPATIENT)
Dept: ORTHOPEDICS | Facility: CLINIC | Age: 44
End: 2024-12-11
Payer: COMMERCIAL

## 2024-12-11 NOTE — TELEPHONE ENCOUNTER
ANNA  ----- Message from So sent at 12/11/2024 10:31 AM CST -----  Regarding: PT'S CALLING REGARDING DISABILITY PAPERWORK  Contact: PT  Pt's needing a call back from staff regarding getting paperwork filled out     Confirmed contact info below:  Contact Name: Cody Castro  Phone Number: [phone]

## 2025-01-27 ENCOUNTER — OFFICE VISIT (OUTPATIENT)
Dept: ORTHOPEDICS | Facility: CLINIC | Age: 45
End: 2025-01-27
Payer: COMMERCIAL

## 2025-01-27 ENCOUNTER — HOSPITAL ENCOUNTER (OUTPATIENT)
Dept: RADIOLOGY | Facility: HOSPITAL | Age: 45
Discharge: HOME | End: 2025-01-27
Attending: ORTHOPAEDIC SURGERY
Payer: COMMERCIAL

## 2025-01-27 DIAGNOSIS — M79.645 FINGER PAIN, LEFT: Primary | ICD-10-CM

## 2025-01-27 DIAGNOSIS — M79.645 FINGER PAIN, LEFT: ICD-10-CM

## 2025-01-27 PROCEDURE — 1160F RVW MEDS BY RX/DR IN RCRD: CPT | Mod: CPTII,S$GLB,, | Performed by: ORTHOPAEDIC SURGERY

## 2025-01-27 PROCEDURE — 73140 X-RAY EXAM OF FINGER(S): CPT | Mod: 26,LT,, | Performed by: RADIOLOGY

## 2025-01-27 PROCEDURE — 73140 X-RAY EXAM OF FINGER(S): CPT | Mod: TC,LT

## 2025-01-27 PROCEDURE — 99213 OFFICE O/P EST LOW 20 MIN: CPT | Mod: S$GLB,,, | Performed by: ORTHOPAEDIC SURGERY

## 2025-01-27 PROCEDURE — 1159F MED LIST DOCD IN RCRD: CPT | Mod: CPTII,S$GLB,, | Performed by: ORTHOPAEDIC SURGERY

## 2025-01-27 PROCEDURE — 99999 PR PBB SHADOW E&M-EST. PATIENT-LVL III: CPT | Mod: PBBFAC,,, | Performed by: ORTHOPAEDIC SURGERY

## 2025-01-27 NOTE — PROGRESS NOTES
Cody Castro presents for post-operative evaluation of   Encounter Diagnosis   Name Primary?    Finger pain, left Yes   The patient is now 3 months 12 days s/p left long .  Overall the patient reports doing well.  The patient reports appropriate postoperative soreness with well controlled overall pain.   He states that the finger tip is slightly bend but it is not inhibiting his daily activities.  He only has pain in the finger when he bumps it.    Vitals:    01/27/25 1600   PainSc: 0-No pain   PainLoc: Finger     PE:    AA&O x 4.  NAD  HEENT:  NCAT, sclera nonicteric  Lungs:  Respirations are equal and unlabored.  CV:  2+ bilateral upper and lower extremity pulses.  MSK: Physical Exam          The incision is well healed.  30 degree DIP joint extensor lag, mild hyperextension PIP joint, able to make a full fist.  Neurovascularly intact and has 5/5 thenar and intrinsic musculature strength.       Diagnostic studies and other clinical records review:  X-rays AP, lateral and oblique left long finger taken today are independently reviewed by me and shows healed left long distal phalanx fracture and PIP injury with some hyperextension.    Assessment & Plan: Assessment & Plan    The patient and I had a thorough discussion today. We discussed the working diagnosis as well as several other potential alternative diagnoses. Treatment options were discussed, both conservative and surgical. Conservative treatment options would include things such as activity modifications, workplace modifications, a period of rest, oral vs topical OTC and prescription anti-inflammatory medications, occupational therapy, splinting/bracing, immobilization, corticosteroid injections, and others. Surgical options were discussed as well. We discussed left long finger tenodermodesis and pinning of the DIP joint as well as left long finger volar plate arthroplasty.    Continue with range of motion; strengthening  F/U as needed  Call with any  questions/concerns in the interim        Christine Murray MD    Please be aware that this note has been generated with the assistance of [employer] voice-to-text.  Please excuse any spelling or grammatical errors.  This note was generated with the assistance of ambient listening technology. Verbal consent was obtained by the patient and accompanying visitor(s) for the recording of patient appointment to facilitate this note. I attest to having reviewed and edited the generated note for accuracy, though some syntax or spelling errors may persist. Please contact the author of this note for any clarification.

## 2025-02-28 ENCOUNTER — TELEPHONE (OUTPATIENT)
Dept: ORTHOPEDICS | Facility: CLINIC | Age: 45
End: 2025-02-28
Payer: COMMERCIAL

## 2025-08-12 DIAGNOSIS — I10 PRIMARY HYPERTENSION: Chronic | ICD-10-CM

## 2025-08-13 RX ORDER — LISINOPRIL 10 MG/1
10 TABLET ORAL DAILY
Qty: 90 TABLET | Refills: 1 | Status: SHIPPED | OUTPATIENT
Start: 2025-08-13 | End: 2030-01-16

## 2025-08-13 RX ORDER — METOPROLOL SUCCINATE 50 MG/1
50 TABLET, EXTENDED RELEASE ORAL DAILY
Qty: 90 TABLET | Refills: 1 | Status: SHIPPED | OUTPATIENT
Start: 2025-08-13 | End: 2026-08-13

## (undated) DEVICE — PAD CAST SPECIALIST STRL 3

## (undated) DEVICE — GLOVE BIOGEL PI MICRO INDIC 7

## (undated) DEVICE — GAUZE SPONGE 4X4 12PLY

## (undated) DEVICE — BANDAGE COFLEX LF2 TAN 1X5YD

## (undated) DEVICE — Device

## (undated) DEVICE — COVER CAMERA OPERATING ROOM

## (undated) DEVICE — CORD FOR BIPOLAR FORCEPS 12

## (undated) DEVICE — GOWN ECLIPSE REINF LVL4 TWL XL

## (undated) DEVICE — BLADE SURG #15 CARBON STEEL

## (undated) DEVICE — FORCEP STRAIGHT DISP

## (undated) DEVICE — GLOVE SENSICARE PI SURG 7.5

## (undated) DEVICE — CORD BIPOLAR 12 FOOT

## (undated) DEVICE — SOL IRR SOD CHL .9% POUR

## (undated) DEVICE — GLOVE SENSICARE PI GRN 8

## (undated) DEVICE — TOURNIQUET SB QC DP 18X4IN

## (undated) DEVICE — SUT 5-0 CHROMIC GUT / P-3

## (undated) DEVICE — GLOVE SENSICARE PI MICRO 7.5

## (undated) DEVICE — BANDAGE COFLEX LF2 TAN 3X5YD

## (undated) DEVICE — SUT 4-0 CHROMIC GUT / RB1

## (undated) DEVICE — KIT COLLECTION E SWAB REGULAR

## (undated) DEVICE — TOWEL OR XRAY BLUE 17X26IN

## (undated) DEVICE — DRESSING N ADH OIL EMUL 3X3

## (undated) DEVICE — IMMOBILIZER HAND

## (undated) DEVICE — DRAPE C-ARM MINI DISP

## (undated) DEVICE — SPONGE COTTON TRAY 4X4IN

## (undated) DEVICE — GLOVE BIOGEL PI MICRO SZ 7

## (undated) DEVICE — SLING ARM LARGE FOAM STRAP

## (undated) DEVICE — SPONGE GAUZE 16PLY 4X4